# Patient Record
Sex: FEMALE | Race: BLACK OR AFRICAN AMERICAN | NOT HISPANIC OR LATINO | Employment: OTHER | URBAN - METROPOLITAN AREA
[De-identification: names, ages, dates, MRNs, and addresses within clinical notes are randomized per-mention and may not be internally consistent; named-entity substitution may affect disease eponyms.]

---

## 2019-09-25 ENCOUNTER — OFFICE VISIT (OUTPATIENT)
Dept: HEMATOLOGY/ONCOLOGY | Facility: CLINIC | Age: 84
End: 2019-09-25
Payer: COMMERCIAL

## 2019-09-25 VITALS
WEIGHT: 144.81 LBS | TEMPERATURE: 98 F | HEART RATE: 91 BPM | HEIGHT: 63 IN | SYSTOLIC BLOOD PRESSURE: 170 MMHG | DIASTOLIC BLOOD PRESSURE: 81 MMHG | OXYGEN SATURATION: 97 % | RESPIRATION RATE: 16 BRPM | BODY MASS INDEX: 25.66 KG/M2

## 2019-09-25 DIAGNOSIS — C67.3 MALIGNANT NEOPLASM OF ANTERIOR WALL OF URINARY BLADDER: Primary | Chronic | ICD-10-CM

## 2019-09-25 DIAGNOSIS — E11.9 TYPE 2 DIABETES MELLITUS WITHOUT COMPLICATION, WITHOUT LONG-TERM CURRENT USE OF INSULIN: Chronic | ICD-10-CM

## 2019-09-25 DIAGNOSIS — I10 ESSENTIAL HYPERTENSION: ICD-10-CM

## 2019-09-25 PROCEDURE — 99205 OFFICE O/P NEW HI 60 MIN: CPT | Mod: S$GLB,,, | Performed by: INTERNAL MEDICINE

## 2019-09-25 PROCEDURE — 99205 PR OFFICE/OUTPT VISIT, NEW, LEVL V, 60-74 MIN: ICD-10-PCS | Mod: S$GLB,,, | Performed by: INTERNAL MEDICINE

## 2019-09-25 PROCEDURE — 1101F PT FALLS ASSESS-DOCD LE1/YR: CPT | Mod: CPTII,S$GLB,, | Performed by: INTERNAL MEDICINE

## 2019-09-25 PROCEDURE — 1101F PR PT FALLS ASSESS DOC 0-1 FALLS W/OUT INJ PAST YR: ICD-10-PCS | Mod: CPTII,S$GLB,, | Performed by: INTERNAL MEDICINE

## 2019-09-25 RX ORDER — LEVOTHYROXINE SODIUM 50 UG/1
TABLET ORAL
COMMUNITY
Start: 2019-09-17 | End: 2020-07-30

## 2019-09-25 RX ORDER — GLIMEPIRIDE 2 MG/1
TABLET ORAL
COMMUNITY
Start: 2019-09-17

## 2019-09-25 RX ORDER — MELOXICAM 7.5 MG/1
TABLET ORAL
COMMUNITY
Start: 2019-07-30

## 2019-09-25 RX ORDER — SIMVASTATIN 20 MG/1
TABLET, FILM COATED ORAL
COMMUNITY
Start: 2019-08-28

## 2019-09-25 RX ORDER — HYDROCODONE BITARTRATE AND ACETAMINOPHEN 5; 325 MG/1; MG/1
TABLET ORAL
COMMUNITY
Start: 2019-09-24 | End: 2022-04-05

## 2019-09-25 RX ORDER — INDAPAMIDE 2.5 MG/1
TABLET ORAL
COMMUNITY
Start: 2019-09-17

## 2019-09-25 RX ORDER — METOPROLOL TARTRATE 50 MG/1
TABLET ORAL
COMMUNITY
Start: 2019-09-17

## 2019-09-25 RX ORDER — LOSARTAN POTASSIUM 25 MG/1
TABLET ORAL
COMMUNITY
Start: 2019-09-17 | End: 2022-02-01

## 2019-09-25 RX ORDER — LORATADINE 10 MG/1
TABLET ORAL
COMMUNITY
Start: 2019-09-19

## 2019-09-25 RX ORDER — METFORMIN HYDROCHLORIDE 500 MG/1
TABLET, EXTENDED RELEASE ORAL
COMMUNITY
Start: 2019-09-17

## 2019-09-25 NOTE — PROGRESS NOTES
HISTORY AND PHYSICAL NOTE  Hematology Oncology  [facility]    Date of Service: 2019    PATIENT: Sherice Haji  : 1932 87 y.o. female  MRN [id number]     PCP: Sejal Castillo MD  Consult Requested By:  No att. providers found    Date of Service: 2019  -----------------------------------------------------    Chief Complaint: Bladder Cancer      History of Present Illness     This is a 87 y.o. female patient with a history of The primary encounter diagnosis was Malignant 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. Diagnoses of Essential hypertension and Type 2 diabetes mellitus without complication, without long-term current use of insulin were also pertinent to this visit., who is referred here for evaluation treatment of bladder cancer.  The patient has been feeling weak and for T for about 2 months.  Urinalysis shows hematuria or and she was referred to Urology.  CT scan with IV contrast demonstrates a bladder mass. Biopsy shows muscle invasive bladder cancer.    The patient lost about 10 lb over the past 2 months.  She denies any abdominal pain chest pain short of breath.     Oncologic History:      Oncologic History     Oncologic Treatment     Pathology      Oncology History:   No history exists.        Past Medical History:   Past Medical History:   Diagnosis Date    Allergy     Anxiety     Arthritis     Bladder cancer     Cataract     Removed in May 2019    Depression     Diabetes mellitus     Hypertension     Malignant 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 2019    Type 2 diabetes mellitus without complication, without long-term current use of insulin 2019       Past Surgical HIstory:   Past Surgical History:   Procedure Laterality Date    BLADDER TUMOR EXCISION  2019    CATARACT EXTRACTION, BILATERAL  2019       Allergies:  Review of patient's allergies indicates:   Allergen Reactions    Aspirin        Medications:  Current  Outpatient Medications   Medication Sig Dispense Refill    glimepiride (AMARYL) 2 MG tablet       HYDROcodone-acetaminophen (NORCO) 5-325 mg per tablet       indapamide (LOZOL) 2.5 MG Tab       levothyroxine (SYNTHROID) 50 MCG tablet       loratadine (CLARITIN) 10 mg tablet       losartan (COZAAR) 25 MG tablet       meloxicam (MOBIC) 7.5 MG tablet       metFORMIN (GLUCOPHAGE-XR) 500 MG 24 hr tablet       metoprolol tartrate (LOPRESSOR) 50 MG tablet       simvastatin (ZOCOR) 20 MG tablet        No current facility-administered medications for this visit.        Family History:   Family History   Problem Relation Age of Onset    No Known Problems Mother     No Known Problems Father     Breast cancer Sister     Prostate cancer Brother     No Known Problems Maternal Aunt     No Known Problems Maternal Uncle     No Known Problems Paternal Aunt     No Known Problems Paternal Uncle     No Known Problems Maternal Grandmother     No Known Problems Maternal Grandfather     No Known Problems Paternal Grandmother     No Known Problems Paternal Grandfather     Breast cancer Sister     Prostate cancer Brother        Social History:  reports that she has never smoked. She has never used smokeless tobacco. She reports that she drank alcohol. She reports that she does not use drugs.    Review of Systemas  Constitutional: No change in weight, appetie, fatigue, fever, or night sweats  Eyes: No changes in vision  Ears, Nose, Mouth, Throat, and Face: No hearing problems, ear pain, rummy nose, or sore throat  Respiratory: No shortness of breath or cough  Cardiovascular: No chest pain, palpitations or dizziness  Gastrointestinal: No abdominal pain, hematochezia, melena  Genitourinary:  See HPI   Integumentary/Breast: No rashes or itching  Hematologic/Lymphatic: No anemia or bleeding abnormalities  Musculoskeletal: No joint or muscle abnormalities  Neurological: No sensory or motor problems, no  "headaches  Behavioral/Psych: No depression, anxiety, cognitive problems, or stress  Endocrine: No diabetes or thyroid problems  Allergic/Immunologic: See allergy above    Physical Exam      Vitals:   Vitals:    09/25/19 1002   BP: (!) 170/81   BP Location: Right arm   Patient Position: Sitting   BP Method: Large (Automatic)   Pulse: 91   Resp: 16   Temp: 97.9 °F (36.6 °C)   TempSrc: Temporal   SpO2: 97%   Weight: 65.7 kg (144 lb 12.8 oz)   Height: 5' 3" (1.6 m)     BMI: Body mass index is 25.65 kg/m².  BSA Body surface area is 1.71 meters squared.  ECOG Performance Status:   ECOG SCORE         GENERAL APPEARANCE: Well developed, well nourished, in no acute distress.  SKIN: Inspection of the skin reveals no rashes, ulcerations or petechiae.  HEENT: The sclerae were anicteric and conjunctivae were pink and moist. Extraocular movements were intact and pupils were equal, round, and reactive to light with normal accommodation. External inspection of the ears and nose showed no scars, lesions, or masses. Lips, teeth, and gums showed normal mucosa. The oral mucosa, hard and soft palate, tongue and posterior pharynx were normal.  NECK: Supple and symmetric. There was no thyroid enlargement, and no tenderness, or masses were felt.  CRESPIRATORY: Normal AP diameter and normal contour without any kyphoscoliosis. LUNGS: Auscultation of the lungs revealed normal breath sounds without any other adventitious sounds or rubs.  CARDIOVASCULAR: There was a regular rate and rhythm without any murmurs, gallops, rubs. The carotid pulses were normal and 2+ bilaterally without bruits. Peripheral pulses were 2+ and symmetric.  GASTROINTESTINAL: Soft and nontender with normal bowel sounds. The liver span was approximately 5-6 cm in the right midclavicular line by percussion. The liver edge was nontender. The spleen was not palpable. There were no inguinal or umbilical hernias noted. No ascites was noted.  LYMPH NODES: No lymphadenopathy was " appreciated in the neck, axillae or groin.  MUSCULOSKELETAL: Gait was normal. There was no tenderness or effusions noted. Muscle strength and tone were normal. EXTREMITIES: No cyanosis, clubbing or edema.  NEUROLOGIC: Alert and oriented x 3. Normal affect. Gait was normal. Normal deep tendon reflexes with no pathological reflexes. Sensation to touch was normal.  PSYCHIATRIC: good mood, orientated to place, time and person     Labs and Imagings     No results found for any previous visit.     Pathology reviewed muscle invasive bladder cancer  Imaging:   PET scan shows no evidence of distant metastatic disease  Assessment:     Principle Problem: Malignant 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 [C67.3]   Co-morbidity/Active Problems:   Patient Active Problem List   Diagnosis    Malignant 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    Essential hypertension    Type 2 diabetes mellitus without complication, without long-term current use of insulin      ----  1. Malignant 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    2. Essential hypertension    3. Type 2 diabetes mellitus without complication, without long-term current use of insulin      ----  Problem List Items Addressed This Visit        Active Problems    Type 2 diabetes mellitus without complication, without long-term current use of insulin (Chronic)    Relevant Medications    glimepiride (AMARYL) 2 MG tablet    metFORMIN (GLUCOPHAGE-XR) 500 MG 24 hr tablet    Malignant 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 - Primary (Chronic)    Relevant Orders    Echo Color Flow Doppler? Yes    CBC auto differential    CMP    Essential hypertension        ----  Sherice Haji is a 87 y.o. 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 Diagnoses of Essential hypertension and Type 2 diabetes mellitus without complication, without long-term current use of insulin were also pertinent to this  visit., with Malignant 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 [C67.3]   87-year-old female otherwise healthy except hypertension diabetes present with bladder mass biopsy-proven is muscle invasive bladder cancer she has a good performance status.    Plan:   --Labs: CBC CMP   --Imaging Studies Ordered:  Cardiac echocardiogram  --Proceed with Rx concurrent chemotherapy cisplatin 20 mg and Taxol 50 mg weekly with radiation therapy after complete staging and workup.  Likely to start in 2 weeks  --refer to Radiation Oncology; the overall plan is to concurrent chemoradiation therapy and bladder preservation.  If there is residual disease after completion of chemoradiation therapy the option will be cystectomy versus second-line immunotherapy to induce a local response in the bladder.  She is on likely to be a good candidate for standard 1st dose of chemotherapy given her advanced age.  --return to clinic in 2 weeks  --to MA Return to Clinic with appointment in 2 weeks   CC: Dr Fountain; Dr Soto Gordon M.D., Ph.D., [facility]  Hematology-Oncology    Signed 9/25/2019 11:15 AM   Note Ends Here

## 2019-10-07 LAB
ABS NRBC COUNT: 0 X 10 3/UL (ref 0–0.01)
ABSOLUTE BASOPHIL: 0.03 X 10 3/UL (ref 0–0.22)
ABSOLUTE EOSINOPHIL: 0.18 X 10 3/UL (ref 0.04–0.54)
ABSOLUTE IMMATURE GRAN: 0.01 X 10 3/UL (ref 0–0.04)
ABSOLUTE LYMPHOCYTE: 2.24 X 10 3/UL (ref 0.86–4.75)
ABSOLUTE MONOCYTE: 0.8 X 10 3/UL (ref 0.22–1.08)
ALBUMIN SERPL-MCNC: 4.6 G/DL (ref 3.5–5.2)
ALBUMIN/GLOB SERPL ELPH: 1.6 {RATIO} (ref 1–2.7)
ALP ISOS SERPL LEV INH-CCNC: 37 U/L (ref 35–105)
ALT (SGPT): 10 U/L (ref 0–33)
ANION GAP SERPL CALC-SCNC: 10 MMOL/L (ref 8–17)
AST SERPL-CCNC: 12 U/L (ref 0–32)
BASOPHILS NFR BLD: 0.6 % (ref 0.2–1.2)
BILIRUBIN, TOTAL: 0.24 MG/DL (ref 0–1.2)
BUN/CREAT SERPL: 14.5 (ref 6–20)
CALCIUM SERPL-MCNC: 10.1 MG/DL (ref 8.6–10.2)
CARBON DIOXIDE, CO2: 28 MMOL/L (ref 22–29)
CHLORIDE: 92 MMOL/L (ref 98–107)
CREAT SERPL-MCNC: 0.83 MG/DL (ref 0.5–0.9)
EOSINOPHIL NFR BLD: 3.7 % (ref 0.7–7)
GFR ESTIMATION: 65.03
GLOBULIN: 2.9 G/DL (ref 1.5–4.5)
GLUCOSE: 186 MG/DL (ref 82–115)
HCT VFR BLD AUTO: 38.1 % (ref 37–47)
HGB BLD-MCNC: 12.9 G/DL (ref 12–16)
IMMATURE GRANULOCYTES: 0.2 % (ref 0–0.5)
LYMPHOCYTES NFR BLD: 45.7 % (ref 19.3–53.1)
MCH RBC QN AUTO: 29.4 PG (ref 27–32)
MCHC RBC AUTO-ENTMCNC: 33.9 G/DL (ref 32–36)
MCV RBC AUTO: 86.8 FL (ref 82–100)
MONOCYTES NFR BLD: 16.3 % (ref 4.7–12.5)
NEUTROPHILS ABSOLUTE COUNT: 1.64 X 10 3/UL (ref 2.15–7.56)
NEUTROPHILS NFR BLD: 33.5 %
NUCLEATED RED BLOOD CELLS: 0 /100 WBC (ref 0–0.2)
PLATELET # BLD AUTO: 239 X 10 3/UL (ref 135–400)
POTASSIUM: 4.6 MMOL/L (ref 3.5–5.1)
PROT SNV-MCNC: 7.5 G/DL (ref 6.4–8.3)
RBC # BLD AUTO: 4.39 X 10 6/UL (ref 4.2–5.4)
RDW-SD: 41.9 FL (ref 37–54)
SODIUM: 130 MMOL/L (ref 136–145)
UREA NITROGEN (BUN): 12 MG/DL (ref 8–23)
WBC # BLD: 4.9 X 10 3/UL (ref 4.3–10.8)

## 2019-10-09 ENCOUNTER — OFFICE VISIT (OUTPATIENT)
Dept: HEMATOLOGY/ONCOLOGY | Facility: CLINIC | Age: 84
End: 2019-10-09
Payer: COMMERCIAL

## 2019-10-09 VITALS
BODY MASS INDEX: 26.22 KG/M2 | OXYGEN SATURATION: 98 % | HEART RATE: 85 BPM | RESPIRATION RATE: 14 BRPM | WEIGHT: 148 LBS | HEIGHT: 63 IN | TEMPERATURE: 98 F | DIASTOLIC BLOOD PRESSURE: 92 MMHG | SYSTOLIC BLOOD PRESSURE: 175 MMHG

## 2019-10-09 DIAGNOSIS — C67.3 MALIGNANT NEOPLASM OF ANTERIOR WALL OF URINARY BLADDER: Primary | ICD-10-CM

## 2019-10-09 DIAGNOSIS — E11.9 TYPE 2 DIABETES MELLITUS WITHOUT COMPLICATION, WITHOUT LONG-TERM CURRENT USE OF INSULIN: ICD-10-CM

## 2019-10-09 DIAGNOSIS — I10 ESSENTIAL HYPERTENSION: ICD-10-CM

## 2019-10-09 PROCEDURE — 1101F PR PT FALLS ASSESS DOC 0-1 FALLS W/OUT INJ PAST YR: ICD-10-PCS | Mod: CPTII,S$GLB,, | Performed by: INTERNAL MEDICINE

## 2019-10-09 PROCEDURE — 1101F PT FALLS ASSESS-DOCD LE1/YR: CPT | Mod: CPTII,S$GLB,, | Performed by: INTERNAL MEDICINE

## 2019-10-09 PROCEDURE — 99215 PR OFFICE/OUTPT VISIT, EST, LEVL V, 40-54 MIN: ICD-10-PCS | Mod: S$GLB,,, | Performed by: INTERNAL MEDICINE

## 2019-10-09 PROCEDURE — 99215 OFFICE O/P EST HI 40 MIN: CPT | Mod: S$GLB,,, | Performed by: INTERNAL MEDICINE

## 2019-10-09 NOTE — PROGRESS NOTES
HISTORY AND PHYSICAL NOTE  Hematology Oncology  [facility]    Date of Service: 10/9/2019    PATIENT: Sherice Haji  : 1932 87 y.o. female  MRN [id number]     PCP: Sejal Castillo MD  Consult Requested By:  No att. providers found    Date of Service: 10/9/2019  -----------------------------------------------------    Chief Complaint: bladder cancer      History of Present Illness     The patient have no new complaints and doing okay The patient here for followup.      This is a 87 y.o. female patient with a history of The primary encounter diagnosis was Malignant 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. Diagnoses of Essential hypertension and Type 2 diabetes mellitus without complication, without long-term current use of insulin were also pertinent to this visit., who is referred here for evaluation treatment of bladder cancer.  The patient has been feeling weak and for T for about 2 months.  Urinalysis shows hematuria or and she was referred to Urology.  CT scan with IV contrast demonstrates a bladder mass. Biopsy shows muscle invasive bladder cancer.    The patient lost about 10 lb over the past 2 months.  She denies any abdominal pain chest pain short of breath.     Oncologic History:      Oncologic History     Oncologic Treatment     Pathology      Oncology History:   No history exists.        Past Medical History:   Past Medical History:   Diagnosis Date    Allergy     Anxiety     Arthritis     Bladder cancer     Cataract     Removed in May 2019    Depression     Diabetes mellitus     Hypertension     Malignant 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 2019    Type 2 diabetes mellitus without complication, without long-term current use of insulin 2019       Past Surgical HIstory:   Past Surgical History:   Procedure Laterality Date    BLADDER TUMOR EXCISION  2019    CATARACT EXTRACTION, BILATERAL  2019       Allergies:  Review of patient's  allergies indicates:   Allergen Reactions    Aspirin        Medications:  Current Outpatient Medications   Medication Sig Dispense Refill    glimepiride (AMARYL) 2 MG tablet       HYDROcodone-acetaminophen (NORCO) 5-325 mg per tablet       indapamide (LOZOL) 2.5 MG Tab       levothyroxine (SYNTHROID) 50 MCG tablet       loratadine (CLARITIN) 10 mg tablet       losartan (COZAAR) 25 MG tablet       meloxicam (MOBIC) 7.5 MG tablet       metFORMIN (GLUCOPHAGE-XR) 500 MG 24 hr tablet       metoprolol tartrate (LOPRESSOR) 50 MG tablet       simvastatin (ZOCOR) 20 MG tablet        No current facility-administered medications for this visit.        Family History:   Family History   Problem Relation Age of Onset    No Known Problems Mother     No Known Problems Father     Breast cancer Sister     Prostate cancer Brother     No Known Problems Maternal Aunt     No Known Problems Maternal Uncle     No Known Problems Paternal Aunt     No Known Problems Paternal Uncle     No Known Problems Maternal Grandmother     No Known Problems Maternal Grandfather     No Known Problems Paternal Grandmother     No Known Problems Paternal Grandfather     Breast cancer Sister     Prostate cancer Brother        Social History:  reports that she has never smoked. She has never used smokeless tobacco. She reports that she drank alcohol. She reports that she does not use drugs.    Review of Systemas  Constitutional: No change in weight, appetie, fatigue, fever, or night sweats  Eyes: No changes in vision  Ears, Nose, Mouth, Throat, and Face: No hearing problems, ear pain, rummy nose, or sore throat  Respiratory: No shortness of breath or cough  Cardiovascular: No chest pain, palpitations or dizziness  Gastrointestinal: No abdominal pain, hematochezia, melena  Genitourinary:  See HPI   Integumentary/Breast: No rashes or itching  Hematologic/Lymphatic: No anemia or bleeding abnormalities  Musculoskeletal: No joint or muscle  "abnormalities  Neurological: No sensory or motor problems, no headaches  Behavioral/Psych: No depression, anxiety, cognitive problems, or stress  Endocrine: No diabetes or thyroid problems  Allergic/Immunologic: See allergy above    Physical Exam      Vitals:   Vitals:    10/09/19 1026   BP: (!) 175/92   BP Location: Right arm   Patient Position: Sitting   BP Method: Large (Automatic)   Pulse: 85   Resp: 14   Temp: 97.6 °F (36.4 °C)   TempSrc: Temporal   SpO2: 98%   Weight: 67.1 kg (148 lb)   Height: 5' 3" (1.6 m)     BMI: Body mass index is 26.22 kg/m².  BSA Body surface area is 1.73 meters squared.  ECOG Performance Status:   ECOG SCORE         GENERAL APPEARANCE: Well developed, well nourished, in no acute distress.  SKIN: Inspection of the skin reveals no rashes, ulcerations or petechiae.  HEENT: The sclerae were anicteric and conjunctivae were pink and moist. Extraocular movements were intact and pupils were equal, round, and reactive to light with normal accommodation. External inspection of the ears and nose showed no scars, lesions, or masses. Lips, teeth, and gums showed normal mucosa. The oral mucosa, hard and soft palate, tongue and posterior pharynx were normal.  NECK: Supple and symmetric. There was no thyroid enlargement, and no tenderness, or masses were felt.  CRESPIRATORY: Normal AP diameter and normal contour without any kyphoscoliosis. LUNGS: Auscultation of the lungs revealed normal breath sounds without any other adventitious sounds or rubs.  CARDIOVASCULAR: There was a regular rate and rhythm without any murmurs, gallops, rubs. The carotid pulses were normal and 2+ bilaterally without bruits. Peripheral pulses were 2+ and symmetric.  GASTROINTESTINAL: Soft and nontender with normal bowel sounds. The liver span was approximately 5-6 cm in the right midclavicular line by percussion. The liver edge was nontender. The spleen was not palpable. There were no inguinal or umbilical hernias noted. No " ascites was noted.  LYMPH NODES: No lymphadenopathy was appreciated in the neck, axillae or groin.  MUSCULOSKELETAL: Gait was normal. There was no tenderness or effusions noted. Muscle strength and tone were normal. EXTREMITIES: No cyanosis, clubbing or edema.  NEUROLOGIC: Alert and oriented x 3. Normal affect. Gait was normal. Normal deep tendon reflexes with no pathological reflexes. Sensation to touch was normal.  PSYCHIATRIC: good mood, orientated to place, time and person     Labs and Imagings     No results found for any previous visit.     Pathology reviewed muscle invasive bladder cancer  Imaging:   PET scan shows no evidence of distant metastatic disease  Assessment:     Principle Problem: Malignant 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 [C67.3]   Co-morbidity/Active Problems:   Patient Active Problem List   Diagnosis    Malignant 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    Essential hypertension    Type 2 diabetes mellitus without complication, without long-term current use of insulin      ----  1. Malignant 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    2. Essential hypertension    3. Type 2 diabetes mellitus without complication, without long-term current use of insulin      ----  Problem List Items Addressed This Visit        Active Problems    Type 2 diabetes mellitus without complication, without long-term current use of insulin (Chronic)    Malignant 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 - Primary (Chronic)    Relevant Orders    CBC auto differential    CMP    Magnesium    Essential hypertension        ----  Sherice Haji is a 87 y.o. 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 [C67.3]    87-year-old female otherwise healthy except hypertension diabetes present with bladder mass biopsy-proven is muscle invasive bladder cancer she has a good performance status.  Normal renal function    Plan:   --Labs: CBC CMP and magnesium every week  --Proceed with Rx concurrent chemotherapy cisplatin 20 mg and Taxol 50 mg weekly with radiation therapy after complete staging and workup.  Likely to start in 2 weeks  --refer to Radiation Oncology; the overall plan is to concurrent chemoradiation therapy and bladder preservation.  If there is residual disease after completion of chemoradiation therapy the option will be cystectomy versus second-line immunotherapy to induce a local response in the bladder.    --return to clinic in 1 weeks  --refer to Dr. Brooks   CC: Dr Fountain; Dr Soto Gordon M.D., Ph.D., [facility]  Hematology-Oncology    Signed 10/9/2019 11:15 AM   Note Ends Here

## 2019-10-14 LAB
ABS NRBC COUNT: 0 X 10 3/UL (ref 0–0.01)
ABSOLUTE BASOPHIL: 0.04 X 10 3/UL (ref 0–0.22)
ABSOLUTE EOSINOPHIL: 0.24 X 10 3/UL (ref 0.04–0.54)
ABSOLUTE IMMATURE GRAN: 0.01 X 10 3/UL (ref 0–0.04)
ABSOLUTE LYMPHOCYTE: 1.74 X 10 3/UL (ref 0.86–4.75)
ABSOLUTE MONOCYTE: 0.71 X 10 3/UL (ref 0.22–1.08)
ALBUMIN SERPL-MCNC: 4.3 G/DL (ref 3.5–5.2)
ALBUMIN/GLOB SERPL ELPH: 1.5 {RATIO} (ref 1–2.7)
ALP ISOS SERPL LEV INH-CCNC: 34 U/L (ref 35–105)
ALT (SGPT): 11 U/L (ref 0–33)
ANION GAP SERPL CALC-SCNC: 8 MMOL/L (ref 8–17)
AST SERPL-CCNC: 11 U/L (ref 0–32)
BASOPHILS NFR BLD: 0.9 % (ref 0.2–1.2)
BILIRUBIN, TOTAL: 0.31 MG/DL (ref 0–1.2)
BUN/CREAT SERPL: 21.4 (ref 6–20)
CALCIUM SERPL-MCNC: 9.9 MG/DL (ref 8.6–10.2)
CARBON DIOXIDE, CO2: 28 MMOL/L (ref 22–29)
CHLORIDE: 97 MMOL/L (ref 98–107)
CREAT SERPL-MCNC: 0.74 MG/DL (ref 0.5–0.9)
EOSINOPHIL NFR BLD: 5.3 % (ref 0.7–7)
GFR ESTIMATION: 74.24
GLOBULIN: 2.8 G/DL (ref 1.5–4.5)
GLUCOSE: 164 MG/DL (ref 82–115)
HCT VFR BLD AUTO: 35.8 % (ref 37–47)
HGB BLD-MCNC: 11.9 G/DL (ref 12–16)
IMMATURE GRANULOCYTES: 0.2 % (ref 0–0.5)
LYMPHOCYTES NFR BLD: 38.2 % (ref 19.3–53.1)
MCH RBC QN AUTO: 29.2 PG (ref 27–32)
MCHC RBC AUTO-ENTMCNC: 33.2 G/DL (ref 32–36)
MCV RBC AUTO: 87.7 FL (ref 82–100)
MONOCYTES NFR BLD: 15.6 % (ref 4.7–12.5)
NEUTROPHILS ABSOLUTE COUNT: 1.82 X 10 3/UL (ref 2.15–7.56)
NEUTROPHILS NFR BLD: 39.8 %
NUCLEATED RED BLOOD CELLS: 0 /100 WBC (ref 0–0.2)
PLATELET # BLD AUTO: 229 X 10 3/UL (ref 135–400)
POTASSIUM: 4.4 MMOL/L (ref 3.5–5.1)
PROT SNV-MCNC: 7.1 G/DL (ref 6.4–8.3)
RBC # BLD AUTO: 4.08 X 10 6/UL (ref 4.2–5.4)
RDW-SD: 44.4 FL (ref 37–54)
SODIUM: 133 MMOL/L (ref 136–145)
UREA NITROGEN (BUN): 15.8 MG/DL (ref 8–23)
WBC # BLD: 4.56 X 10 3/UL (ref 4.3–10.8)

## 2019-10-16 ENCOUNTER — OFFICE VISIT (OUTPATIENT)
Dept: HEMATOLOGY/ONCOLOGY | Facility: CLINIC | Age: 84
End: 2019-10-16
Payer: COMMERCIAL

## 2019-10-16 VITALS
DIASTOLIC BLOOD PRESSURE: 84 MMHG | SYSTOLIC BLOOD PRESSURE: 173 MMHG | HEART RATE: 91 BPM | HEIGHT: 63 IN | BODY MASS INDEX: 26.08 KG/M2 | RESPIRATION RATE: 16 BRPM | TEMPERATURE: 98 F | WEIGHT: 147.19 LBS | OXYGEN SATURATION: 96 %

## 2019-10-16 DIAGNOSIS — C67.3 MALIGNANT NEOPLASM OF ANTERIOR WALL OF URINARY BLADDER: Primary | ICD-10-CM

## 2019-10-16 PROCEDURE — 99214 OFFICE O/P EST MOD 30 MIN: CPT | Mod: S$GLB,,, | Performed by: INTERNAL MEDICINE

## 2019-10-16 PROCEDURE — 1101F PT FALLS ASSESS-DOCD LE1/YR: CPT | Mod: CPTII,S$GLB,, | Performed by: INTERNAL MEDICINE

## 2019-10-16 PROCEDURE — 99214 PR OFFICE/OUTPT VISIT, EST, LEVL IV, 30-39 MIN: ICD-10-PCS | Mod: S$GLB,,, | Performed by: INTERNAL MEDICINE

## 2019-10-16 PROCEDURE — 1101F PR PT FALLS ASSESS DOC 0-1 FALLS W/OUT INJ PAST YR: ICD-10-PCS | Mod: CPTII,S$GLB,, | Performed by: INTERNAL MEDICINE

## 2019-10-16 NOTE — PROGRESS NOTES
Hematology Oncology Progress Note    Hematology Oncology  [facility]     PATIENT: Sherice Haji  : 1932 87 y.o. female  MRN [id number]     PCP: Sejal Castillo MD  Consult Requested By:  No att. providers found    Date of Service: 10/16/2019    Subjective:   Interim History:  Bladder Cancer    Sherice Haji is here for to followup for bladder cancer.  Current therapy patient doing well without new complaints she specifically denies hematuria or weight loss she has a good appetite.  This is a 87 y.o. female patient with a history of The primary encounter diagnosis was Malignant 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. Diagnoses of Essential hypertension and Type 2 diabetes mellitus without complication, without long-term current use of insulin were also pertinent to this visit., who is referred here for evaluation treatment of bladder cancer.  The patient has been feeling weak and for T for about 2 months.  Urinalysis shows hematuria or and she was referred to Urology.  CT scan with IV contrast demonstrates a bladder mass. Biopsy shows muscle invasive bladder cancer.     The patient lost about 10 lb over the past 2 months.  She denies any abdominal pain chest pain short of breath.        Oncology History:   No history exists.       Oncologic History:      Oncologic History     Oncologic Treatment     Pathology      Past Medical History:   Past Medical History:   Diagnosis Date    Allergy     Anxiety     Arthritis     Bladder cancer     Cataract     Removed in May 2019    Depression     Diabetes mellitus     Hypertension     Malignant 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 2019    Type 2 diabetes mellitus without complication, without long-term current use of insulin 2019       Past Surgical HIstory:   Past Surgical History:   Procedure Laterality Date    BLADDER TUMOR EXCISION  2019    CATARACT EXTRACTION, BILATERAL  2019        Allergies:  Review of patient's allergies indicates:   Allergen Reactions    Aspirin        Medications:  Current Outpatient Medications   Medication Sig Dispense Refill    glimepiride (AMARYL) 2 MG tablet       HYDROcodone-acetaminophen (NORCO) 5-325 mg per tablet       indapamide (LOZOL) 2.5 MG Tab       levothyroxine (SYNTHROID) 50 MCG tablet       loratadine (CLARITIN) 10 mg tablet       losartan (COZAAR) 25 MG tablet       meloxicam (MOBIC) 7.5 MG tablet       metFORMIN (GLUCOPHAGE-XR) 500 MG 24 hr tablet       metoprolol tartrate (LOPRESSOR) 50 MG tablet       simvastatin (ZOCOR) 20 MG tablet        No current facility-administered medications for this visit.        Family History:   Family History   Problem Relation Age of Onset    No Known Problems Mother     No Known Problems Father     Breast cancer Sister     Prostate cancer Brother     No Known Problems Maternal Aunt     No Known Problems Maternal Uncle     No Known Problems Paternal Aunt     No Known Problems Paternal Uncle     No Known Problems Maternal Grandmother     No Known Problems Maternal Grandfather     No Known Problems Paternal Grandmother     No Known Problems Paternal Grandfather     Breast cancer Sister     Prostate cancer Brother        Social History:  reports that she has never smoked. She has never used smokeless tobacco. She reports that she drank alcohol. She reports that she does not use drugs.    Review of Systemas  Constitutional: No change in weight, appetie, fatigue, fever, or night sweats  Eyes: No changes in vision  Ears, Nose, Mouth, Throat, and Face: No hearing problems, ear pain, rummy nose, or sore throat  Respiratory: No shortness of breath or cough  Cardiovascular: No chest pain, palpitations or dizziness  Gastrointestinal: No abdominal pain, hematochezia, melena  Genitourinary: No dysuria, hematuria, nocturia, menstrual problems, post menopausal  Integumentary/Breast: No rashes or  "itching  Hematologic/Lymphatic: No anemia or bleeding abnormalities  Musculoskeletal: No joint or muscle abnormalities  Neurological: No sensory or motor problems, no headaches  Behavioral/Psych: No depression, anxiety, cognitive problems, or stress  Endocrine: No diabetes or thyroid problems  Allergic/Immunologic: See allergy above    Physical Exam      Vitals:   Vitals:    10/16/19 0902   BP: (!) 173/84   BP Location: Left arm   Patient Position: Sitting   BP Method: Large (Automatic)   Pulse: 91   Resp: 16   Temp: 97.6 °F (36.4 °C)   TempSrc: Temporal   SpO2: 96%   Weight: 66.8 kg (147 lb 3.2 oz)   Height: 5' 3" (1.6 m)     BMI: Body mass index is 26.08 kg/m².  BSA Body surface area is 1.72 meters squared.  ECOG Performance Status:   ECOG SCORE        ECOG 1    GENERAL APPEARANCE: Well developed, well nourished, in no acute distress.  SKIN: Inspection of the skin reveals no rashes, ulcerations or petechiae.  HEENT: The sclerae were anicteric and conjunctivae were pink and moist. Extraocular movements were intact and pupils were equal, round, and reactive to light with normal accommodation. External inspection of the ears and nose showed no scars, lesions, or masses. Lips, teeth, and gums showed normal mucosa. The oral mucosa, hard and soft palate, tongue and posterior pharynx were normal.  NECK: Supple and symmetric. There was no thyroid enlargement, and no tenderness, or masses were felt.  CRESPIRATORY: Normal AP diameter and normal contour without any kyphoscoliosis. LUNGS: Auscultation of the lungs revealed normal breath sounds without any other adventitious sounds or rubs.  CARDIOVASCULAR: There was a regular rate and rhythm without any murmurs, gallops, rubs. The carotid pulses were normal and 2+ bilaterally without bruits. Peripheral pulses were 2+ and symmetric.  GASTROINTESTINAL: Soft and nontender with normal bowel sounds. The liver span was approximately 5-6 cm in the right midclavicular line by " percussion. The liver edge was nontender. The spleen was not palpable. There were no inguinal or umbilical hernias noted. No ascites was noted.  LYMPH NODES: No lymphadenopathy was appreciated in the neck, axillae or groin.  MUSCULOSKELETAL: Gait was normal. There was no tenderness or effusions noted. Muscle strength and tone were normal. EXTREMITIES: No cyanosis, clubbing or edema.  NEUROLOGIC: Alert and oriented x 3. Normal affect. Gait was normal. Normal deep tendon reflexes with no pathological reflexes. Sensation to touch was normal.  PSYCHIATRIC: good mood, orientated to place, time and person     Labs and Imagings     No results found for any previous visit.       Imaging:     Assessment:     Principle Problem: Malignant 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 [C67.3]   Co-morbidity/Active Problems:   Patient Active Problem List   Diagnosis    Malignant 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    Essential hypertension    Type 2 diabetes mellitus without complication, without long-term current use of insulin        Sherice Haji is a 87 y.o. 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 ECOG 1  Normal renal function      Plan:   Overall Plan:  Concurrent chemoradiation therapy    --Labs: CBC CMP and  magnesium every week  --Proceed with Rx concurrent chemotherapy cisplatin 20 mg and Taxol 50 mg weekly with radiation therapy after complete staging and workup.   --refer to Radiation Oncology; the overall plan is to concurrent chemoradiation therapy and bladder preservation.  If there is residual disease after completion of chemoradiation therapy the option will be cystectomy versus second-line immunotherapy to induce a local response in the bladder.    --return to clinic in 2 week  --refer to Dr. Todd Gordon M.D., Ph.D., [facility]  Hematology-Oncology    Signed 10/16/2019 9:47 AM   Note Ends Here

## 2019-10-21 PROBLEM — C67.3 MALIGNANT NEOPLASM OF ANTERIOR WALL OF URINARY BLADDER: Status: ACTIVE | Noted: 2019-09-25

## 2019-10-30 ENCOUNTER — OFFICE VISIT (OUTPATIENT)
Dept: HEMATOLOGY/ONCOLOGY | Facility: CLINIC | Age: 84
End: 2019-10-30
Payer: COMMERCIAL

## 2019-10-30 VITALS
RESPIRATION RATE: 12 BRPM | WEIGHT: 147.38 LBS | TEMPERATURE: 98 F | HEIGHT: 63 IN | DIASTOLIC BLOOD PRESSURE: 90 MMHG | BODY MASS INDEX: 26.11 KG/M2 | HEART RATE: 68 BPM | SYSTOLIC BLOOD PRESSURE: 160 MMHG

## 2019-10-30 DIAGNOSIS — C67.3 MALIGNANT NEOPLASM OF ANTERIOR WALL OF URINARY BLADDER: Primary | ICD-10-CM

## 2019-10-30 DIAGNOSIS — Z51.11 ENCOUNTER FOR ANTINEOPLASTIC CHEMOTHERAPY: ICD-10-CM

## 2019-10-30 DIAGNOSIS — E11.9 TYPE 2 DIABETES MELLITUS WITHOUT COMPLICATION, WITHOUT LONG-TERM CURRENT USE OF INSULIN: ICD-10-CM

## 2019-10-30 DIAGNOSIS — I10 ESSENTIAL HYPERTENSION: ICD-10-CM

## 2019-10-30 PROCEDURE — 1101F PT FALLS ASSESS-DOCD LE1/YR: CPT | Mod: CPTII,S$GLB,, | Performed by: NURSE PRACTITIONER

## 2019-10-30 PROCEDURE — 1101F PR PT FALLS ASSESS DOC 0-1 FALLS W/OUT INJ PAST YR: ICD-10-PCS | Mod: CPTII,S$GLB,, | Performed by: NURSE PRACTITIONER

## 2019-10-30 PROCEDURE — 99214 PR OFFICE/OUTPT VISIT, EST, LEVL IV, 30-39 MIN: ICD-10-PCS | Mod: S$GLB,,, | Performed by: NURSE PRACTITIONER

## 2019-10-30 PROCEDURE — 99214 OFFICE O/P EST MOD 30 MIN: CPT | Mod: S$GLB,,, | Performed by: NURSE PRACTITIONER

## 2019-10-30 RX ORDER — SODIUM CHLORIDE 0.9 % (FLUSH) 0.9 %
10 SYRINGE (ML) INJECTION
Status: CANCELLED | OUTPATIENT
Start: 2019-10-31

## 2019-10-30 RX ORDER — FAMOTIDINE 10 MG/ML
20 INJECTION INTRAVENOUS
Status: CANCELLED | OUTPATIENT
Start: 2019-10-31

## 2019-10-30 RX ORDER — HEPARIN 100 UNIT/ML
500 SYRINGE INTRAVENOUS
Status: CANCELLED | OUTPATIENT
Start: 2019-10-31

## 2019-10-30 RX ORDER — EPINEPHRINE 0.3 MG/.3ML
0.3 INJECTION SUBCUTANEOUS ONCE AS NEEDED
Status: CANCELLED | OUTPATIENT
Start: 2019-10-31

## 2019-10-30 RX ORDER — DIPHENHYDRAMINE HYDROCHLORIDE 50 MG/ML
50 INJECTION INTRAMUSCULAR; INTRAVENOUS ONCE AS NEEDED
Status: CANCELLED | OUTPATIENT
Start: 2019-10-31

## 2019-10-30 NOTE — PROGRESS NOTES
Hematology Oncology Progress Note    Hematology Oncology  [facility]     PATIENT: Sherice Haji  : 1932 87 y.o. female  MRN [id number]     PCP: Sejal Castillo MD  Consult Requested By:  No att. providers found    Date of Service: 10/30/2019    Subjective:   Interim History:  Cancer (Malignant 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 )    Sherice Haji is here for to followup for bladder cancer.  Current therapy patient doing well without new complaints she specifically denies hematuria or weight loss she has a good appetite.  This is a 87 y.o. female patient with a history of The primary encounter diagnosis was Malignant 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. Diagnoses of Essential hypertension and Type 2 diabetes mellitus without complication, without long-term current use of insulin were also pertinent to this visit., who is referred here for evaluation treatment of bladder cancer.  The patient has been feeling weak and for T for about 2 months.  Urinalysis shows hematuria or and she was referred to Urology.  CT scan with IV contrast demonstrates a bladder mass. Biopsy shows muscle invasive bladder cancer.     The patient lost about 10 lb over the past 2 months.  She denies any abdominal pain chest pain short of breath.        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    2019 Initial Diagnosis     Malignant 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      10/21/2019 Cancer Staged     Staging form: Urinary Bladder, AJCC 8th Edition  - Clinical: Stage II (cT2, cN0, cM0)      10/22/2019 -  Chemotherapy     Treatment Summary   Plan Name: cisplatin taxol weekly  Treatment Goal: Curative  Status: Active  Start Date: 10/22/2019 (Planned)  End Date: 2019 (Planned)  Provider: Hoda Josue NP  Chemotherapy: CISplatin (PLATINOL) 20 mg/m2 = 34 mg in sodium chloride 0.9% 500 mL chemo infusion, 20 mg/m2 = 34 mg (original dose ),  Intravenous, Clinic/HOD 1 time, 0 of 6 cycles  Dose modification: 20 mg/m2 (Cycle 1)  PACLitaxel (TAXOL) 50 mg/m2 = 84 mg in sodium chloride 0.9% 500 mL chemo infusion, 50 mg/m2 = 84 mg (original dose ), Intravenous, Clinic/HOD 1 time, 0 of 6 cycles  Dose modification: 50 mg/m2 (Cycle 1)         Oncologic History:      Oncologic History     Oncologic Treatment     Pathology      Past Medical History:   Past Medical History:   Diagnosis Date    Allergy     Anxiety     Arthritis     Bladder cancer     Cataract     Removed in May 2019    Depression     Diabetes mellitus     Hypertension     Malignant 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 9/25/2019    Type 2 diabetes mellitus without complication, without long-term current use of insulin 9/25/2019       Past Surgical HIstory:   Past Surgical History:   Procedure Laterality Date    BLADDER TUMOR EXCISION  09/2019    CATARACT EXTRACTION, BILATERAL  05/2019       Allergies:  Review of patient's allergies indicates:   Allergen Reactions    Aspirin        Medications:  Current Outpatient Medications   Medication Sig Dispense Refill    glimepiride (AMARYL) 2 MG tablet       HYDROcodone-acetaminophen (NORCO) 5-325 mg per tablet       indapamide (LOZOL) 2.5 MG Tab       levothyroxine (SYNTHROID) 50 MCG tablet       loratadine (CLARITIN) 10 mg tablet       losartan (COZAAR) 25 MG tablet       meloxicam (MOBIC) 7.5 MG tablet       metFORMIN (GLUCOPHAGE-XR) 500 MG 24 hr tablet       metoprolol tartrate (LOPRESSOR) 50 MG tablet       simvastatin (ZOCOR) 20 MG tablet        No current facility-administered medications for this visit.        Family History:   Family History   Problem Relation Age of Onset    No Known Problems Mother     No Known Problems Father     Breast cancer Sister     Prostate cancer Brother     No Known Problems Maternal Aunt     No Known Problems Maternal Uncle     No Known Problems Paternal Aunt     No Known Problems  "Paternal Uncle     No Known Problems Maternal Grandmother     No Known Problems Maternal Grandfather     No Known Problems Paternal Grandmother     No Known Problems Paternal Grandfather     Breast cancer Sister     Prostate cancer Brother        Social History:  reports that she has never smoked. She has never used smokeless tobacco. She reports that she drank alcohol. She reports that she does not use drugs.    Review of Systemas  Constitutional: No change in weight, appetie, fatigue, fever, or night sweats  Eyes: No changes in vision  Ears, Nose, Mouth, Throat, and Face: No hearing problems, ear pain, rummy nose, or sore throat  Respiratory: No shortness of breath or cough  Cardiovascular: No chest pain, palpitations or dizziness  Gastrointestinal: No abdominal pain, hematochezia, melena  Genitourinary: No dysuria, hematuria, nocturia, menstrual problems, post menopausal  Integumentary/Breast: No rashes or itching  Hematologic/Lymphatic: No anemia or bleeding abnormalities  Musculoskeletal: No joint or muscle abnormalities  Neurological: No sensory or motor problems, no headaches  Behavioral/Psych: No depression, anxiety, cognitive problems, or stress  Endocrine: No diabetes or thyroid problems  Allergic/Immunologic: See allergy above    Physical Exam      Vitals:   Vitals:    10/30/19 1056   Resp: 12   Temp: 97.7 °F (36.5 °C)   TempSrc: Temporal   Weight: 66.9 kg (147 lb 6.4 oz)   Height: 5' 3" (1.6 m)     BMI: Body mass index is 26.11 kg/m².  BSA Body surface area is 1.72 meters squared.  ECOG Performance Status:   ECOG SCORE        ECOG 1    GENERAL APPEARANCE: Well developed, well nourished, in no acute distress.  SKIN: Inspection of the skin reveals no rashes, ulcerations or petechiae.  HEENT: The sclerae were anicteric and conjunctivae were pink and moist. Extraocular movements were intact and pupils were equal, round, and reactive to light with normal accommodation. External inspection of the ears " and nose showed no scars, lesions, or masses. Lips, teeth, and gums showed normal mucosa. The oral mucosa, hard and soft palate, tongue and posterior pharynx were normal.  NECK: Supple and symmetric. There was no thyroid enlargement, and no tenderness, or masses were felt.  CRESPIRATORY: Normal AP diameter and normal contour without any kyphoscoliosis. LUNGS: Auscultation of the lungs revealed normal breath sounds without any other adventitious sounds or rubs.  CARDIOVASCULAR: There was a regular rate and rhythm without any murmurs, gallops, rubs. The carotid pulses were normal and 2+ bilaterally without bruits. Peripheral pulses were 2+ and symmetric.  GASTROINTESTINAL: Soft and nontender with normal bowel sounds. The liver span was approximately 5-6 cm in the right midclavicular line by percussion. The liver edge was nontender. The spleen was not palpable. There were no inguinal or umbilical hernias noted. No ascites was noted.  LYMPH NODES: No lymphadenopathy was appreciated in the neck, axillae or groin.  MUSCULOSKELETAL: Gait was normal. There was no tenderness or effusions noted. Muscle strength and tone were normal. EXTREMITIES: No cyanosis, clubbing or edema.  NEUROLOGIC: Alert and oriented x 3. Normal affect. Gait was normal. Normal deep tendon reflexes with no pathological reflexes. Sensation to touch was normal.  PSYCHIATRIC: good mood, orientated to place, time and person     Labs and Imagings     No results found for any previous visit.       Imaging:     Assessment:     Principle Problem: No primary diagnosis found.   Co-morbidity/Active Problems:   Patient Active Problem List   Diagnosis    Malignant 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    Essential hypertension    Type 2 diabetes mellitus without complication, without long-term current use of insulin        Sherice Haji is a 87 y.o. female with PMH of There were no encounter diagnoses., with No primary diagnosis found.   Sherice  King Adithya is a 87 y.o. 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 ECOG 1  Normal renal function      Plan:   Overall Plan:  Concurrent chemoradiation therapy    --Labs: CBC CMP and magnesium every week  --Proceed with Rx concurrent chemotherapy cisplatin 20 mg and Taxol 50 mg weekly with radiation therapy after complete staging and workup.   --refer to Radiation Oncology; the overall plan is to concurrent chemoradiation therapy and bladder preservation.  If there is residual disease after completion of chemoradiation therapy the option will be cystectomy versus second-line immunotherapy to induce a local response in the bladder.    --return to clinic in 2 week  --refer to Dr. Todd Gordon M.D., Ph.D., [facility]  Hematology-Oncology    Signed 10/30/2019 9:47 AM   Note Ends Here

## 2019-10-30 NOTE — PROGRESS NOTES
Subjective:      Patient ID: Sherice Haji is a 87 y.o. female.    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    9/25/2019 Initial Diagnosis     Malignant 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      10/21/2019 Cancer Staged     Staging form: Urinary Bladder, AJCC 8th Edition  - Clinical: Stage II (cT2, cN0, cM0)      10/30/2019 -  Chemotherapy     Treatment Summary   Plan Name: cisplatin taxol weekly  Treatment Goal: Curative  Status: Active  Start Date: 10/30/2019 (Planned)  End Date: 12/4/2019 (Planned)  Provider: Hoda Josue NP  Chemotherapy: CISplatin (PLATINOL) 20 mg/m2 = 34 mg in sodium chloride 0.9% 500 mL chemo infusion, 20 mg/m2 = 34 mg (100 % of original dose 20 mg/m2), Intravenous, Clinic/HOD 1 time, 0 of 6 cycles  Dose modification: 20 mg/m2 (original dose 20 mg/m2, Cycle 1)  PACLitaxel (TAXOL) 50 mg/m2 = 84 mg in sodium chloride 0.9% 500 mL chemo infusion, 50 mg/m2 = 84 mg (100 % of original dose 50 mg/m2), Intravenous, Clinic/HOD 1 time, 0 of 6 cycles  Dose modification: 50 mg/m2 (original dose 50 mg/m2, Cycle 1)           Chief Complaint: Cancer (Malignant 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 )    Sherice Haji is here for cisplatin taxol weekly      Treatment Goal:   Curative      Status:   Active      Start Date:   10/30/2019 (Planned)      End Date:   12/4/2019 (Planned)      Provider:   Hoda Josue NP      Chemotherapy:   CISplatin (PLATINOL) 20 mg/m2 = 34 mg in sodium chloride 0.9% 500 mL chemo infusion, 20 mg/m2 = 34 mg (100 % of original dose 20 mg/m2), Intravenous, Clinic/HOD 1 time, 0 of 6 cycles    Dose modification: 20 mg/m2 (original dose 20 mg/m2, Cycle 1)        PACLitaxel (TAXOL) 50 mg/m2 = 84 mg in sodium chloride 0.9% 500 mL chemo infusion, 50 mg/m2 = 84 mg (100 % of original dose 50 mg/m2), Intravenous, Clinic/HOD 1 time, 0 of 6 cycles    Dose modification: 50 mg/m2 (original dose 50 mg/m2, Cycle 1)  Sherice Latif  Adithya is here for to followup for bladder cancer.  Current therapy patient doing well without new complaints she specifically denies hematuria or weight loss she has a good appetite.  This is a 87 y.o. female patient with a history of The primary encounter diagnosis was Malignant 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. Diagnoses of Essential hypertension and Type 2 diabetes mellitus without complication, without long-term current use of insulin were also pertinent to this visit., who is referred here for evaluation treatment of bladder cancer.  The patient has been feeling weak and for Tired for about 2 months.  Urinalysis shows hematuria or and she was referred to Urology.  CT scan with IV contrast demonstrates a bladder mass. Biopsy shows muscle invasive bladder cancer.     The patient lost about 10 lb over the past 2 months.  She denies any abdominal pain chest pain short of breath.     10/30/19 visit:  Today she is in clinic to discuss starting cycle 1 of weekly taxol with cisplatin. She states she began XRT today with Dr Brooks and has no c/o. Feeling good, ambu with cane and has very active lifestyle for her age. She is eager to begin tx but does not want it to negatively impact her ADLs. She does not have a PICC or [place] will set up PICC for next week prior to cycle 2.     Imagin19: CT A/P:      Past Medical History:   Diagnosis Date    Allergy     Anxiety     Arthritis     Bladder cancer     Cataract     Removed in May 2019    Depression     Diabetes mellitus     Hypertension     Malignant 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 2019    Type 2 diabetes mellitus without complication, without long-term current use of insulin 2019     Family History   Problem Relation Age of Onset    No Known Problems Mother     No Known Problems Father     Breast cancer Sister     Prostate cancer Brother     No Known Problems Maternal Aunt     No Known Problems Maternal Uncle      No Known Problems Paternal Aunt     No Known Problems Paternal Uncle     No Known Problems Maternal Grandmother     No Known Problems Maternal Grandfather     No Known Problems Paternal Grandmother     No Known Problems Paternal Grandfather     Breast cancer Sister     Prostate cancer Brother      Social History     Socioeconomic History    Marital status:      Spouse name: Not on file    Number of children: Not on file    Years of education: Not on file    Highest education level: Not on file   Occupational History    Not on file   Social Needs    Financial resource strain: Not on file    Food insecurity:     Worry: Not on file     Inability: Not on file    Transportation needs:     Medical: Not on file     Non-medical: Not on file   Tobacco Use    Smoking status: Never Smoker    Smokeless tobacco: Never Used   Substance and Sexual Activity    Alcohol use: Not Currently    Drug use: Never    Sexual activity: Not Currently   Lifestyle    Physical activity:     Days per week: Not on file     Minutes per session: Not on file    Stress: Not on file   Relationships    Social connections:     Talks on phone: Not on file     Gets together: Not on file     Attends Caoda[employer] service: Not on file     Active member of club or organization: Not on file     Attends meetings of clubs or organizations: Not on file     Relationship status: Not on file   Other Topics Concern    Not on file   Social History Narrative    Not on file     Past Surgical History:   Procedure Laterality Date    BLADDER TUMOR EXCISION  09/2019    CATARACT EXTRACTION, BILATERAL  05/2019           Review of systems:  Review of Systems   Constitutional: Positive for activity change, appetite change and unexpected weight change. Negative for chills, diaphoresis, fatigue and fever.   HENT: Negative for congestion, dental problem, mouth sores, nosebleeds, sore throat, tinnitus and voice change.    Eyes: Negative for  photophobia, discharge and visual disturbance.   Respiratory: Negative for apnea, cough, choking, chest tightness, shortness of breath, wheezing and stridor.    Cardiovascular: Negative for chest pain, palpitations and leg swelling.   Gastrointestinal: Negative for abdominal distention, abdominal pain, anal bleeding, blood in stool, constipation, diarrhea, nausea, rectal pain and vomiting.   Endocrine: Negative for cold intolerance and heat intolerance.   Genitourinary: Positive for hematuria. Negative for difficulty urinating, dysuria, menstrual problem, vaginal bleeding, vaginal discharge and vaginal pain.   Musculoskeletal: Negative for arthralgias, back pain, gait problem, joint swelling and myalgias.   Skin: Negative for color change, pallor, rash and wound.   Neurological: Negative for dizziness, syncope, light-headedness and numbness.   Hematological: Negative for adenopathy. Does not bruise/bleed easily.   Psychiatric/Behavioral: Negative for agitation, confusion, sleep disturbance and suicidal ideas. The patient is not nervous/anxious.        Objective:     Physical Exam   Constitutional: She is oriented to person, place, and time. She appears well-developed and well-nourished.   HENT:   Head: Normocephalic.   Eyes: Pupils are equal, round, and reactive to light. Conjunctivae and EOM are normal.   Neck: Normal range of motion. Neck supple.   Cardiovascular: Normal rate, regular rhythm, normal heart sounds and intact distal pulses.   Pulmonary/Chest: Effort normal and breath sounds normal. She exhibits no mass, no tenderness and no deformity. No breast swelling, tenderness or discharge. Breasts are symmetrical.   Abdominal: Soft. Bowel sounds are normal.   Genitourinary: No breast swelling, tenderness or discharge.   Musculoskeletal: Normal range of motion.   Neurological: She is alert and oriented to person, place, and time.   Skin: Skin is warm and dry.   Psychiatric: She has a normal mood and affect. Her  behavior is normal. Judgment and thought content normal.     Vitals:    10/30/19 1056   BP: (!) 160/90   Pulse: 68   Resp: 12   Temp: 97.7 °F (36.5 °C)   Body surface area is 1.72 meters squared.    Labs:  10/30/19 CBC CMP Reviewed    Assessment:      1. Malignant 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    2. Encounter for antineoplastic chemotherapy    3. Essential hypertension    4. Type 2 diabetes mellitus without complication, without long-term current use of insulin           Plan:   Malignant 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  -     CBC w/ DIFF; Standing  -     CMP; Standing  -     Magnesium; Standing      1. Today is she cleared to begin cycle 1 of weekly taxol/cisplatin. Consents have been signed and Se profile discussed.  2.She began XRT today with Dr Brooks.  3. All questions answered satisfactorily.   4. RTC weekly with cbc cmp mag.  5. Refer to IR for PICC placement, pt is not on any blood thinners.     Hoda Josue, ANP

## 2019-11-06 ENCOUNTER — OFFICE VISIT (OUTPATIENT)
Dept: HEMATOLOGY/ONCOLOGY | Facility: CLINIC | Age: 84
End: 2019-11-06
Payer: COMMERCIAL

## 2019-11-06 VITALS
RESPIRATION RATE: 10 BRPM | WEIGHT: 150.19 LBS | TEMPERATURE: 98 F | HEART RATE: 84 BPM | SYSTOLIC BLOOD PRESSURE: 166 MMHG | HEIGHT: 63 IN | DIASTOLIC BLOOD PRESSURE: 82 MMHG | BODY MASS INDEX: 26.61 KG/M2 | OXYGEN SATURATION: 84 %

## 2019-11-06 DIAGNOSIS — D70.1 CHEMOTHERAPY-INDUCED NEUTROPENIA: ICD-10-CM

## 2019-11-06 DIAGNOSIS — T45.1X5A CHEMOTHERAPY-INDUCED NAUSEA: Primary | ICD-10-CM

## 2019-11-06 DIAGNOSIS — T45.1X5A CHEMOTHERAPY-INDUCED NEUTROPENIA: ICD-10-CM

## 2019-11-06 DIAGNOSIS — Z51.11 ENCOUNTER FOR ANTINEOPLASTIC CHEMOTHERAPY: ICD-10-CM

## 2019-11-06 DIAGNOSIS — C67.3 MALIGNANT NEOPLASM OF ANTERIOR WALL OF URINARY BLADDER: ICD-10-CM

## 2019-11-06 DIAGNOSIS — R11.0 CHEMOTHERAPY-INDUCED NAUSEA: Primary | ICD-10-CM

## 2019-11-06 PROCEDURE — 99214 OFFICE O/P EST MOD 30 MIN: CPT | Mod: S$GLB,,, | Performed by: NURSE PRACTITIONER

## 2019-11-06 PROCEDURE — 1101F PT FALLS ASSESS-DOCD LE1/YR: CPT | Mod: CPTII,S$GLB,, | Performed by: NURSE PRACTITIONER

## 2019-11-06 PROCEDURE — 99214 PR OFFICE/OUTPT VISIT, EST, LEVL IV, 30-39 MIN: ICD-10-PCS | Mod: S$GLB,,, | Performed by: NURSE PRACTITIONER

## 2019-11-06 PROCEDURE — 1101F PR PT FALLS ASSESS DOC 0-1 FALLS W/OUT INJ PAST YR: ICD-10-PCS | Mod: CPTII,S$GLB,, | Performed by: NURSE PRACTITIONER

## 2019-11-06 RX ORDER — ONDANSETRON HYDROCHLORIDE 8 MG/1
8 TABLET, FILM COATED ORAL EVERY 12 HOURS PRN
Qty: 30 TABLET | Refills: 2 | Status: SHIPPED | OUTPATIENT
Start: 2019-11-06 | End: 2019-11-06

## 2019-11-06 RX ORDER — ONDANSETRON HYDROCHLORIDE 8 MG/1
8 TABLET, FILM COATED ORAL EVERY 12 HOURS PRN
Qty: 30 TABLET | Refills: 2 | Status: SHIPPED | OUTPATIENT
Start: 2019-11-06 | End: 2020-11-05

## 2019-11-06 RX ORDER — SODIUM CHLORIDE 0.9 % (FLUSH) 0.9 %
10 SYRINGE (ML) INJECTION
Status: CANCELLED | OUTPATIENT
Start: 2019-11-07

## 2019-11-06 RX ORDER — HEPARIN 100 UNIT/ML
500 SYRINGE INTRAVENOUS
Status: CANCELLED | OUTPATIENT
Start: 2019-11-07

## 2019-11-06 RX ORDER — FAMOTIDINE 10 MG/ML
20 INJECTION INTRAVENOUS
Status: CANCELLED | OUTPATIENT
Start: 2019-11-07

## 2019-11-06 RX ORDER — EPINEPHRINE 0.3 MG/.3ML
0.3 INJECTION SUBCUTANEOUS ONCE AS NEEDED
Status: CANCELLED | OUTPATIENT
Start: 2019-11-07

## 2019-11-06 RX ORDER — DIPHENHYDRAMINE HYDROCHLORIDE 50 MG/ML
50 INJECTION INTRAMUSCULAR; INTRAVENOUS ONCE AS NEEDED
Status: CANCELLED | OUTPATIENT
Start: 2019-11-07

## 2019-11-06 NOTE — PROGRESS NOTES
Subjective:      Patient ID: Sherice Haji is a 87 y.o. female.    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    9/25/2019 Initial Diagnosis     Malignant 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      10/21/2019 Cancer Staged     Staging form: Urinary Bladder, AJCC 8th Edition  - Clinical: Stage II (cT2, cN0, cM0)      10/31/2019 -  Chemotherapy     Treatment Summary   Plan Name: cisplatin taxol weekly  Treatment Goal: Curative  Status: Active  Start Date: 10/31/2019  End Date: 12/5/2019 (Planned)  Provider: Hoda Josue NP  Chemotherapy: CISplatin (PLATINOL) 20 mg/m2 = 34 mg in sodium chloride 0.9% 500 mL chemo infusion, 20 mg/m2 = 34 mg (100 % of original dose 20 mg/m2), Intravenous, Clinic/HOD 1 time, 1 of 6 cycles  Dose modification: 20 mg/m2 (original dose 20 mg/m2, Cycle 1)  PACLitaxel (TAXOL) 50 mg/m2 = 84 mg in sodium chloride 0.9% 500 mL chemo infusion, 50 mg/m2 = 84 mg (100 % of original dose 50 mg/m2), Intravenous, Clinic/HOD 1 time, 1 of 6 cycles  Dose modification: 50 mg/m2 (original dose 50 mg/m2, Cycle 1)           Chief Complaint: Maligant 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    Sherice Haji is here for cisplatin taxol weekly      Treatment Goal:   Curative      Status:   Active      Start Date:   10/31/2019      End Date:   12/5/2019 (Planned)      Provider:   Hoda Josue NP      Chemotherapy:   CISplatin (PLATINOL) 20 mg/m2 = 34 mg in sodium chloride 0.9% 500 mL chemo infusion, 20 mg/m2 = 34 mg (100 % of original dose 20 mg/m2), Intravenous, Clinic/HOD 1 time, 1 of 6 cycles    Dose modification: 20 mg/m2 (original dose 20 mg/m2, Cycle 1)        PACLitaxel (TAXOL) 50 mg/m2 = 84 mg in sodium chloride 0.9% 500 mL chemo infusion, 50 mg/m2 = 84 mg (100 % of original dose 50 mg/m2), Intravenous, Clinic/HOD 1 time, 1 of 6 cycles    Dose modification: 50 mg/m2 (original dose 50 mg/m2, Cycle 1)  Sherice Haji is here for to followup for  bladder cancer.  Current therapy patient doing well without new complaints she specifically denies hematuria or weight loss she has a good appetite.  This is a 87 y.o. female patient with a history of The primary encounter diagnosis was Malignant 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. Diagnoses of Essential hypertension and Type 2 diabetes mellitus without complication, without long-term current use of insulin were also pertinent to this visit., who is referred here for evaluation treatment of bladder cancer.  The patient has been feeling weak and for Tired for about 2 months.  Urinalysis shows hematuria or and she was referred to Urology.  CT scan with IV contrast demonstrates a bladder mass. Biopsy shows muscle invasive bladder cancer.     The patient lost about 10 lb over the past 2 months.  She denies any abdominal pain chest pain short of breath.     10/30/19 visit:  Today she is in clinic to discuss starting cycle 1 of weekly taxol with cisplatin. She states she began XRT today with Dr Brooks and has no c/o. Feeling good, ambu with cane and has very active lifestyle for her age. She is eager to begin tx but does not want it to negatively impact her ADLs. She does not have a PICC or [place] will set up PICC for next week prior to cycle 2.     19 visit;  Today she is in clinic after cycle 1 of weekly taxol/cis. She states she tolerated well but did have some mild nausea. She received her PICC line and infusion will do weekly dressing changes. Labs reviewed WBC 2.6  ANC 1.2 will proceed with chemo as planned for tomorrow but will closely monitor neutropenia.     Imagin19: CT A/P:      Past Medical History:   Diagnosis Date    Allergy     Anxiety     Arthritis     Bladder cancer     Cataract     Removed in May 2019    Depression     Diabetes mellitus     Hypertension     Malignant 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 2019    Type 2 diabetes mellitus without complication,  without long-term current use of insulin 9/25/2019     Family History   Problem Relation Age of Onset    No Known Problems Mother     No Known Problems Father     Breast cancer Sister     Prostate cancer Brother     No Known Problems Maternal Aunt     No Known Problems Maternal Uncle     No Known Problems Paternal Aunt     No Known Problems Paternal Uncle     No Known Problems Maternal Grandmother     No Known Problems Maternal Grandfather     No Known Problems Paternal Grandmother     No Known Problems Paternal Grandfather     Breast cancer Sister     Prostate cancer Brother      Social History     Socioeconomic History    Marital status:      Spouse name: Not on file    Number of children: Not on file    Years of education: Not on file    Highest education level: Not on file   Occupational History    Not on file   Social Needs    Financial resource strain: Not on file    Food insecurity:     Worry: Not on file     Inability: Not on file    Transportation needs:     Medical: Not on file     Non-medical: Not on file   Tobacco Use    Smoking status: Never Smoker    Smokeless tobacco: Never Used   Substance and Sexual Activity    Alcohol use: Not Currently    Drug use: Never    Sexual activity: Not Currently   Lifestyle    Physical activity:     Days per week: Not on file     Minutes per session: Not on file    Stress: Not on file   Relationships    Social connections:     Talks on phone: Not on file     Gets together: Not on file     Attends Orthodoxy service: Not on file     Active member of club or organization: Not on file     Attends meetings of clubs or organizations: Not on file     Relationship status: Not on file   Other Topics Concern    Not on file   Social History Narrative    Not on file     Past Surgical History:   Procedure Laterality Date    BLADDER TUMOR EXCISION  09/2019    CATARACT EXTRACTION, BILATERAL  05/2019           Review of systems:  Review of Systems    Constitutional: Positive for activity change, appetite change and unexpected weight change. Negative for chills, diaphoresis, fatigue and fever.   HENT: Negative for congestion, dental problem, mouth sores, nosebleeds, sore throat, tinnitus and voice change.    Eyes: Negative for photophobia, discharge and visual disturbance.   Respiratory: Negative for apnea, cough, choking, chest tightness, shortness of breath, wheezing and stridor.    Cardiovascular: Negative for chest pain, palpitations and leg swelling.   Gastrointestinal: Negative for abdominal distention, abdominal pain, anal bleeding, blood in stool, constipation, diarrhea, nausea, rectal pain and vomiting.   Endocrine: Negative for cold intolerance and heat intolerance.   Genitourinary: Positive for hematuria. Negative for difficulty urinating, dysuria, menstrual problem, vaginal bleeding, vaginal discharge and vaginal pain.   Musculoskeletal: Negative for arthralgias, back pain, gait problem, joint swelling and myalgias.   Skin: Negative for color change, pallor, rash and wound.   Neurological: Negative for dizziness, syncope, light-headedness and numbness.   Hematological: Negative for adenopathy. Does not bruise/bleed easily.   Psychiatric/Behavioral: Negative for agitation, confusion, sleep disturbance and suicidal ideas. The patient is not nervous/anxious.        Objective:     Physical Exam   Constitutional: She is oriented to person, place, and time. She appears well-developed and well-nourished.   HENT:   Head: Normocephalic.   Eyes: Pupils are equal, round, and reactive to light. Conjunctivae and EOM are normal.   Neck: Normal range of motion. Neck supple.   Cardiovascular: Normal rate, regular rhythm, normal heart sounds and intact distal pulses.   Pulmonary/Chest: Effort normal and breath sounds normal. She exhibits no mass, no tenderness and no deformity. No breast swelling, tenderness or discharge. Breasts are symmetrical.   Abdominal: Soft.  Bowel sounds are normal.   Musculoskeletal: Normal range of motion.   Neurological: She is alert and oriented to person, place, and time.   Skin: Skin is warm and dry.   Psychiatric: She has a normal mood and affect. Her behavior is normal. Judgment and thought content normal.     Vitals:    11/06/19 0830   BP: (!) 166/82   Pulse: 84   Resp: 10   Temp: 97.8 °F (36.6 °C)   Body surface area is 1.74 meters squared.    Labs:  11/5/19  WBC 2.5 ANC 1.2 Hgb 11.6  Na 132, K 4.6 mG 2.1 LFT WNL Cr 0.90    Assessment:      1. Chemotherapy-induced nausea    2. Malignant 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    3. Encounter for antineoplastic chemotherapy    4. Chemotherapy-induced neutropenia           Plan:   There are no diagnoses linked to this encounter.  1. Today is she cleared cor cycle 2 of weekly taxol/cisplatin. Consents have been signed and Se profile discussed.  2.She is to continue XRT with Dr Brooks.  3. All questions answered satisfactorily.   4. RTC weekly with cbc cmp mag.       Hoda Josue, ANP

## 2019-11-14 ENCOUNTER — OFFICE VISIT (OUTPATIENT)
Dept: HEMATOLOGY/ONCOLOGY | Facility: CLINIC | Age: 84
End: 2019-11-14
Payer: COMMERCIAL

## 2019-11-14 VITALS
DIASTOLIC BLOOD PRESSURE: 70 MMHG | TEMPERATURE: 97 F | RESPIRATION RATE: 12 BRPM | BODY MASS INDEX: 26.43 KG/M2 | HEART RATE: 91 BPM | OXYGEN SATURATION: 95 % | SYSTOLIC BLOOD PRESSURE: 160 MMHG | HEIGHT: 63 IN | WEIGHT: 149.19 LBS

## 2019-11-14 DIAGNOSIS — C67.3 MALIGNANT NEOPLASM OF ANTERIOR WALL OF URINARY BLADDER: ICD-10-CM

## 2019-11-14 DIAGNOSIS — Z51.11 ENCOUNTER FOR ANTINEOPLASTIC CHEMOTHERAPY: ICD-10-CM

## 2019-11-14 DIAGNOSIS — T45.1X5A CHEMOTHERAPY-INDUCED NAUSEA: Primary | ICD-10-CM

## 2019-11-14 DIAGNOSIS — E11.9 TYPE 2 DIABETES MELLITUS WITHOUT COMPLICATION, WITHOUT LONG-TERM CURRENT USE OF INSULIN: ICD-10-CM

## 2019-11-14 DIAGNOSIS — R11.0 CHEMOTHERAPY-INDUCED NAUSEA: Primary | ICD-10-CM

## 2019-11-14 PROCEDURE — 99214 OFFICE O/P EST MOD 30 MIN: CPT | Mod: S$GLB,,, | Performed by: NURSE PRACTITIONER

## 2019-11-14 PROCEDURE — 99214 PR OFFICE/OUTPT VISIT, EST, LEVL IV, 30-39 MIN: ICD-10-PCS | Mod: S$GLB,,, | Performed by: NURSE PRACTITIONER

## 2019-11-14 PROCEDURE — 1101F PR PT FALLS ASSESS DOC 0-1 FALLS W/OUT INJ PAST YR: ICD-10-PCS | Mod: CPTII,S$GLB,, | Performed by: NURSE PRACTITIONER

## 2019-11-14 PROCEDURE — 1101F PT FALLS ASSESS-DOCD LE1/YR: CPT | Mod: CPTII,S$GLB,, | Performed by: NURSE PRACTITIONER

## 2019-11-14 RX ORDER — DIPHENHYDRAMINE HYDROCHLORIDE 50 MG/ML
50 INJECTION INTRAMUSCULAR; INTRAVENOUS ONCE AS NEEDED
Status: CANCELLED | OUTPATIENT
Start: 2019-11-14

## 2019-11-14 RX ORDER — EPINEPHRINE 0.3 MG/.3ML
0.3 INJECTION SUBCUTANEOUS ONCE AS NEEDED
Status: CANCELLED | OUTPATIENT
Start: 2019-11-14

## 2019-11-14 RX ORDER — SODIUM CHLORIDE 0.9 % (FLUSH) 0.9 %
10 SYRINGE (ML) INJECTION
Status: CANCELLED | OUTPATIENT
Start: 2019-11-14

## 2019-11-14 RX ORDER — HEPARIN 100 UNIT/ML
500 SYRINGE INTRAVENOUS
Status: CANCELLED | OUTPATIENT
Start: 2019-11-14

## 2019-11-14 RX ORDER — FAMOTIDINE 10 MG/ML
20 INJECTION INTRAVENOUS
Status: CANCELLED | OUTPATIENT
Start: 2019-11-14

## 2019-11-14 NOTE — PROGRESS NOTES
Subjective:      Patient ID: Sherice Haji is a 87 y.o. female.    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    9/25/2019 Initial Diagnosis     Malignant 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      10/21/2019 Cancer Staged     Staging form: Urinary Bladder, AJCC 8th Edition  - Clinical: Stage II (cT2, cN0, cM0)      10/31/2019 -  Chemotherapy     Treatment Summary   Plan Name: cisplatin taxol weekly  Treatment Goal: Curative  Status: Active  Start Date: 10/31/2019  End Date: 12/5/2019 (Planned)  Provider: Hoda Josue NP  Chemotherapy: CISplatin (PLATINOL) 20 mg/m2 = 34 mg in sodium chloride 0.9% 500 mL chemo infusion, 20 mg/m2 = 34 mg (100 % of original dose 20 mg/m2), Intravenous, Clinic/HOD 1 time, 1 of 6 cycles  Dose modification: 20 mg/m2 (original dose 20 mg/m2, Cycle 1)  PACLitaxel (TAXOL) 50 mg/m2 = 84 mg in sodium chloride 0.9% 500 mL chemo infusion, 50 mg/m2 = 84 mg (100 % of original dose 50 mg/m2), Intravenous, Clinic/HOD 1 time, 1 of 6 cycles  Dose modification: 50 mg/m2 (original dose 50 mg/m2, Cycle 1)           Chief Complaint: Bladder Cancer    Sherice Haji is here for cisplatin taxol weekly      Treatment Goal:   Curative      Status:   Active      Start Date:   10/31/2019      End Date:   12/5/2019 (Planned)      Provider:   Hoda Josue NP      Chemotherapy:   CISplatin (PLATINOL) 20 mg/m2 = 34 mg in sodium chloride 0.9% 500 mL chemo infusion, 20 mg/m2 = 34 mg (100 % of original dose 20 mg/m2), Intravenous, Clinic/HOD 1 time, 1 of 6 cycles    Dose modification: 20 mg/m2 (original dose 20 mg/m2, Cycle 1)        PACLitaxel (TAXOL) 50 mg/m2 = 84 mg in sodium chloride 0.9% 500 mL chemo infusion, 50 mg/m2 = 84 mg (100 % of original dose 50 mg/m2), Intravenous, Clinic/HOD 1 time, 1 of 6 cycles    Dose modification: 50 mg/m2 (original dose 50 mg/m2, Cycle 1)  Sherice Haji is here for to followup for bladder cancer.  Current therapy  patient doing well without new complaints she specifically denies hematuria or weight loss she has a good appetite.  This is a 87 y.o. female patient with a history of The primary encounter diagnosis was Malignant 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. Diagnoses of Essential hypertension and Type 2 diabetes mellitus without complication, without long-term current use of insulin were also pertinent to this visit., who is referred here for evaluation treatment of bladder cancer.  The patient has been feeling weak and for Tired for about 2 months.  Urinalysis shows hematuria or and she was referred to Urology.  CT scan with IV contrast demonstrates a bladder mass. Biopsy shows muscle invasive bladder cancer.     The patient lost about 10 lb over the past 2 months.  She denies any abdominal pain chest pain short of breath.     10/30/19 visit:  Today she is in clinic to discuss starting cycle 1 of weekly taxol with cisplatin. She states she began XRT today with Dr Brooks and has no c/o. Feeling good, ambu with cane and has very active lifestyle for her age. She is eager to begin tx but does not want it to negatively impact her ADLs. She does not have a PICC or [place] will set up PICC for next week prior to cycle 2.     11/6/19 visit;  Today she is in clinic after cycle 1 of weekly taxol/cis. She states she tolerated well but did have some mild nausea. She received her PICC line and infusion will do weekly dressing changes. Labs reviewed WBC 2.6  ANC 1.2 will proceed with chemo as planned for tomorrow but will closely monitor neutropenia.     11/14/19 visit:  Today she is here for consideration of cycle 3 of 6 cis/taxol with weekly XRt. She is tolerating tx very well with mid nausea noted but controlled with meds. She states her am FGLU is >200 recently, explained likely due to pre-med steroids. Will help facilitate appt with PCP for BS control. Otherwise, pt is cleared for tx today and will RTC in 1 week with  labs. She is to continue XRT with Dr Brooks.      Imagin19: CT A/P:      Past Medical History:   Diagnosis Date    Allergy     Anxiety     Arthritis     Bladder cancer     Cataract     Removed in May 2019    Depression     Diabetes mellitus     Hypertension     Malignant 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 2019    Type 2 diabetes mellitus without complication, without long-term current use of insulin 2019     Family History   Problem Relation Age of Onset    No Known Problems Mother     No Known Problems Father     Breast cancer Sister     Prostate cancer Brother     No Known Problems Maternal Aunt     No Known Problems Maternal Uncle     No Known Problems Paternal Aunt     No Known Problems Paternal Uncle     No Known Problems Maternal Grandmother     No Known Problems Maternal Grandfather     No Known Problems Paternal Grandmother     No Known Problems Paternal Grandfather     Breast cancer Sister     Prostate cancer Brother      Social History     Socioeconomic History    Marital status:      Spouse name: Not on file    Number of children: Not on file    Years of education: Not on file    Highest education level: Not on file   Occupational History    Not on file   Social Needs    Financial resource strain: Not on file    Food insecurity:     Worry: Not on file     Inability: Not on file    Transportation needs:     Medical: Not on file     Non-medical: Not on file   Tobacco Use    Smoking status: Never Smoker    Smokeless tobacco: Never Used   Substance and Sexual Activity    Alcohol use: Not Currently    Drug use: Never    Sexual activity: Not Currently   Lifestyle    Physical activity:     Days per week: Not on file     Minutes per session: Not on file    Stress: Not on file   Relationships    Social connections:     Talks on phone: Not on file     Gets together: Not on file     Attends [employer] service: Not on file     Active member of  club or organization: Not on file     Attends meetings of clubs or organizations: Not on file     Relationship status: Not on file   Other Topics Concern    Not on file   Social History Narrative    Not on file     Past Surgical History:   Procedure Laterality Date    BLADDER TUMOR EXCISION  09/2019    CATARACT EXTRACTION, BILATERAL  05/2019           Review of systems:  Review of Systems   Constitutional: Positive for activity change, appetite change and unexpected weight change. Negative for chills, diaphoresis, fatigue and fever.   HENT: Negative for congestion, dental problem, mouth sores, nosebleeds, sore throat, tinnitus and voice change.    Eyes: Negative for photophobia, discharge and visual disturbance.   Respiratory: Negative for apnea, cough, choking, chest tightness, shortness of breath, wheezing and stridor.    Cardiovascular: Negative for chest pain, palpitations and leg swelling.   Gastrointestinal: Negative for abdominal distention, abdominal pain, anal bleeding, blood in stool, constipation, diarrhea, nausea, rectal pain and vomiting.   Endocrine: Negative for cold intolerance and heat intolerance.   Genitourinary: Positive for hematuria. Negative for difficulty urinating, dysuria, menstrual problem, vaginal bleeding, vaginal discharge and vaginal pain.   Musculoskeletal: Negative for arthralgias, back pain, gait problem, joint swelling and myalgias.   Skin: Negative for color change, pallor, rash and wound.   Neurological: Negative for dizziness, syncope, light-headedness and numbness.   Hematological: Negative for adenopathy. Does not bruise/bleed easily.   Psychiatric/Behavioral: Negative for agitation, confusion, sleep disturbance and suicidal ideas. The patient is not nervous/anxious.        Objective:     Physical Exam   Constitutional: She is oriented to person, place, and time. She appears well-developed and well-nourished.   HENT:   Head: Normocephalic.   Eyes: Pupils are equal, round,  and reactive to light. Conjunctivae and EOM are normal.   Neck: Normal range of motion. Neck supple.   Cardiovascular: Normal rate, regular rhythm, normal heart sounds and intact distal pulses.   Pulmonary/Chest: Effort normal and breath sounds normal. She exhibits no mass, no tenderness and no deformity. No breast swelling, tenderness or discharge. Breasts are symmetrical.   Abdominal: Soft. Bowel sounds are normal.   Musculoskeletal: Normal range of motion.   Neurological: She is alert and oriented to person, place, and time.   Skin: Skin is warm and dry.   Psychiatric: She has a normal mood and affect. Her behavior is normal. Judgment and thought content normal.     Vitals:    11/14/19 0905   BP: (!) 160/70   Pulse:    Resp:    Temp:    Body surface area is 1.73 meters squared.  Wt Readings from Last 3 Encounters:   11/14/19 67.7 kg (149 lb 3.2 oz)   11/06/19 68.1 kg (150 lb 3.2 oz)   10/30/19 66.9 kg (147 lb 6.4 oz)     Temp Readings from Last 3 Encounters:   11/14/19 96.7 °F (35.9 °C) (Temporal)   11/06/19 97.8 °F (36.6 °C) (Temporal)   10/30/19 97.7 °F (36.5 °C) (Temporal)     BP Readings from Last 3 Encounters:   11/14/19 (!) 160/70   11/06/19 (!) 166/82   10/30/19 (!) 160/90     Pulse Readings from Last 3 Encounters:   11/14/19 91   11/06/19 84   10/30/19 68     Labs:  11/5/19  WBC 2.5 ANC 1.2 Hgb 11.6  Na 132, K 4.6 mG 2.1 LFT WNL Cr 0.90    11/12/19 WBC 2.4 HGB 10.8 Hct 32.1  ANC 1.8 Na 132 Na 4.1 Cr 0.979 mg 1.7 LFT WNL FGLU 262    Assessment:      1. Chemotherapy-induced nausea    2. Type 2 diabetes mellitus without complication, without long-term current use of insulin    3. Encounter for antineoplastic chemotherapy    4. Malignant 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           Plan:   Chemotherapy-induced nausea    Type 2 diabetes mellitus without complication, without long-term current use of insulin    Encounter for antineoplastic chemotherapy    Malignant neoplasm of anterior  wall of urinary bladder      1. Today is she cleared cor cycle 3 of 6 weekly taxol/cisplatin. Consents have been signed and Se profile discussed. Mild nausea report but controlled with meds.  2.She is to continue XRT with Dr Brooks.  3. Elevated FGLU noted by patient at home, >200. She is to see her PCP tomorrow at 8 am for medication adjustments. Likely due to premed decadron 12 mg but unable to lower dose due to SE profile of Taxol.   4. RTC weekly with cbc cmp mag.       Hoda Josue, ANP

## 2019-11-21 ENCOUNTER — OFFICE VISIT (OUTPATIENT)
Dept: HEMATOLOGY/ONCOLOGY | Facility: CLINIC | Age: 84
End: 2019-11-21
Payer: COMMERCIAL

## 2019-11-21 VITALS
BODY MASS INDEX: 26.28 KG/M2 | TEMPERATURE: 99 F | DIASTOLIC BLOOD PRESSURE: 70 MMHG | SYSTOLIC BLOOD PRESSURE: 140 MMHG | HEIGHT: 63 IN | OXYGEN SATURATION: 96 % | WEIGHT: 148.31 LBS | HEART RATE: 87 BPM | RESPIRATION RATE: 14 BRPM

## 2019-11-21 DIAGNOSIS — C67.3 MALIGNANT NEOPLASM OF ANTERIOR WALL OF URINARY BLADDER: ICD-10-CM

## 2019-11-21 DIAGNOSIS — E11.9 TYPE 2 DIABETES MELLITUS WITHOUT COMPLICATION, WITHOUT LONG-TERM CURRENT USE OF INSULIN: Primary | ICD-10-CM

## 2019-11-21 DIAGNOSIS — I10 ESSENTIAL HYPERTENSION: ICD-10-CM

## 2019-11-21 PROCEDURE — 1126F PR PAIN SEVERITY QUANTIFIED, NO PAIN PRESENT: ICD-10-PCS | Mod: S$GLB,,, | Performed by: INTERNAL MEDICINE

## 2019-11-21 PROCEDURE — 1126F AMNT PAIN NOTED NONE PRSNT: CPT | Mod: S$GLB,,, | Performed by: INTERNAL MEDICINE

## 2019-11-21 PROCEDURE — 1101F PR PT FALLS ASSESS DOC 0-1 FALLS W/OUT INJ PAST YR: ICD-10-PCS | Mod: CPTII,S$GLB,, | Performed by: INTERNAL MEDICINE

## 2019-11-21 PROCEDURE — 99215 PR OFFICE/OUTPT VISIT, EST, LEVL V, 40-54 MIN: ICD-10-PCS | Mod: S$GLB,,, | Performed by: INTERNAL MEDICINE

## 2019-11-21 PROCEDURE — 1101F PT FALLS ASSESS-DOCD LE1/YR: CPT | Mod: CPTII,S$GLB,, | Performed by: INTERNAL MEDICINE

## 2019-11-21 PROCEDURE — 1159F MED LIST DOCD IN RCRD: CPT | Mod: S$GLB,,, | Performed by: INTERNAL MEDICINE

## 2019-11-21 PROCEDURE — 99215 OFFICE O/P EST HI 40 MIN: CPT | Mod: S$GLB,,, | Performed by: INTERNAL MEDICINE

## 2019-11-21 PROCEDURE — 1159F PR MEDICATION LIST DOCUMENTED IN MEDICAL RECORD: ICD-10-PCS | Mod: S$GLB,,, | Performed by: INTERNAL MEDICINE

## 2019-11-21 RX ORDER — SODIUM CHLORIDE 0.9 % (FLUSH) 0.9 %
10 SYRINGE (ML) INJECTION
Status: CANCELLED | OUTPATIENT
Start: 2019-11-21

## 2019-11-21 RX ORDER — EPINEPHRINE 0.3 MG/.3ML
0.3 INJECTION SUBCUTANEOUS ONCE AS NEEDED
Status: CANCELLED | OUTPATIENT
Start: 2019-11-21

## 2019-11-21 RX ORDER — HEPARIN 100 UNIT/ML
500 SYRINGE INTRAVENOUS
Status: CANCELLED | OUTPATIENT
Start: 2019-11-21

## 2019-11-21 RX ORDER — FAMOTIDINE 10 MG/ML
20 INJECTION INTRAVENOUS
Status: CANCELLED | OUTPATIENT
Start: 2019-11-21

## 2019-11-21 RX ORDER — DIPHENHYDRAMINE HYDROCHLORIDE 50 MG/ML
50 INJECTION INTRAMUSCULAR; INTRAVENOUS ONCE AS NEEDED
Status: CANCELLED | OUTPATIENT
Start: 2019-11-21

## 2019-11-21 NOTE — PROGRESS NOTES
Hematology Oncology Progress Note    Hematology Oncology  [facility]     PATIENT: Sherice Haji  : 1932 87 y.o. female  MRN [id number]     PCP: Sejal Castillo MD  Consult Requested By:  No att. providers found    Date of Service: 2019    Subjective:   Interim History:  Malignant neoplasm of anterior wall of Urinary bladder    Sherice Haji is here for to followup for bladder cancer.  Current therapy patient doing well without new complaints she specifically denies hematuria or weight loss she has a good appetite.  This is a 87 y.o. female patient with a history of The primary encounter diagnosis was Malignant 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. Diagnoses of Essential hypertension and Type 2 diabetes mellitus without complication, without long-term current use of insulin were also pertinent to this visit., who is referred here for evaluation treatment of bladder cancer.  The patient has been feeling weak and for T for about 2 months.  Urinalysis shows hematuria or and she was referred to Urology.  CT scan with IV contrast demonstrates a bladder mass. Biopsy shows muscle invasive bladder cancer.     The patient lost about 10 lb over the past 2 months.  She denies any abdominal pain chest pain short of breath.        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    2019 Initial Diagnosis     Malignant 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      10/21/2019 Cancer Staged     Staging form: Urinary Bladder, AJCC 8th Edition  - Clinical: Stage II (cT2, cN0, cM0)      10/31/2019 -  Chemotherapy     Treatment Summary   Plan Name: cisplatin taxol weekly  Treatment Goal: Curative  Status: Active  Start Date: 10/31/2019  End Date: 2019 (Planned)  Provider: Hoda Josue NP  Chemotherapy: CISplatin (PLATINOL) 20 mg/m2 = 34 mg in sodium chloride 0.9% 500 mL chemo infusion, 20 mg/m2 = 34 mg (100 % of original dose 20 mg/m2),  Intravenous, Clinic/HOD 1 time, 3 of 6 cycles  Dose modification: 20 mg/m2 (original dose 20 mg/m2, Cycle 1)  PACLitaxel (TAXOL) 50 mg/m2 = 84 mg in sodium chloride 0.9% 500 mL chemo infusion, 50 mg/m2 = 84 mg (100 % of original dose 50 mg/m2), Intravenous, Clinic/HOD 1 time, 3 of 6 cycles  Dose modification: 50 mg/m2 (original dose 50 mg/m2, Cycle 1)         Oncologic History:      Oncologic History     Oncologic Treatment     Pathology      Past Medical History:   Past Medical History:   Diagnosis Date    Allergy     Anxiety     Arthritis     Bladder cancer     Cataract     Removed in May 2019    Depression     Diabetes mellitus     Hypertension     Malignant 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 9/25/2019    Type 2 diabetes mellitus without complication, without long-term current use of insulin 9/25/2019       Past Surgical HIstory:   Past Surgical History:   Procedure Laterality Date    BLADDER TUMOR EXCISION  09/2019    CATARACT EXTRACTION, BILATERAL  05/2019       Allergies:  Review of patient's allergies indicates:   Allergen Reactions    Aspirin        Medications:  Current Outpatient Medications   Medication Sig Dispense Refill    glimepiride (AMARYL) 2 MG tablet       HYDROcodone-acetaminophen (NORCO) 5-325 mg per tablet       indapamide (LOZOL) 2.5 MG Tab       levothyroxine (SYNTHROID) 50 MCG tablet       loratadine (CLARITIN) 10 mg tablet       losartan (COZAAR) 25 MG tablet       meloxicam (MOBIC) 7.5 MG tablet       metFORMIN (GLUCOPHAGE-XR) 500 MG 24 hr tablet       metoprolol tartrate (LOPRESSOR) 50 MG tablet       ondansetron (ZOFRAN) 8 MG tablet Take 1 tablet (8 mg total) by mouth every 12 (twelve) hours as needed for Nausea. 30 tablet 2    simvastatin (ZOCOR) 20 MG tablet        No current facility-administered medications for this visit.        Family History:   Family History   Problem Relation Age of Onset    No Known Problems Mother     No Known Problems  "Father     Breast cancer Sister     Prostate cancer Brother     No Known Problems Maternal Aunt     No Known Problems Maternal Uncle     No Known Problems Paternal Aunt     No Known Problems Paternal Uncle     No Known Problems Maternal Grandmother     No Known Problems Maternal Grandfather     No Known Problems Paternal Grandmother     No Known Problems Paternal Grandfather     Breast cancer Sister     Prostate cancer Brother        Social History:  reports that she has never smoked. She has never used smokeless tobacco. She reports that she drank alcohol. She reports that she does not use drugs.    Review of Systemas  Constitutional: No change in weight, appetie, fatigue, fever, or night sweats  Eyes: No changes in vision  Ears, Nose, Mouth, Throat, and Face: No hearing problems, ear pain, rummy nose, or sore throat  Respiratory: No shortness of breath or cough  Cardiovascular: No chest pain, palpitations or dizziness  Gastrointestinal: No abdominal pain, hematochezia, melena  Genitourinary: No dysuria, hematuria, nocturia, menstrual problems, post menopausal  Integumentary/Breast: No rashes or itching  Hematologic/Lymphatic: No anemia or bleeding abnormalities  Musculoskeletal: No joint or muscle abnormalities  Neurological: No sensory or motor problems, no headaches  Behavioral/Psych: No depression, anxiety, cognitive problems, or stress  Endocrine: No diabetes or thyroid problems  Allergic/Immunologic: See allergy above    Physical Exam      Vitals:   Vitals:    11/21/19 0846   BP: (!) 140/70   BP Location: Right arm   Patient Position: Sitting   BP Method: Large (Manual)   Pulse: 87   Resp: 14   Temp: 98.6 °F (37 °C)   TempSrc: Temporal   SpO2: 96%   Weight: 67.3 kg (148 lb 4.8 oz)   Height: 5' 3" (1.6 m)     BMI: Body mass index is 26.27 kg/m².  BSA Body surface area is 1.73 meters squared.  ECOG Performance Status:   ECOG SCORE    0 - Fully active-able to carry on all pre-disease performance " without restriction      ECOG 1    GENERAL APPEARANCE: Well developed, well nourished, in no acute distress.  SKIN: Inspection of the skin reveals no rashes, ulcerations or petechiae.  HEENT: The sclerae were anicteric and conjunctivae were pink and moist. Extraocular movements were intact and pupils were equal, round, and reactive to light with normal accommodation. External inspection of the ears and nose showed no scars, lesions, or masses. Lips, teeth, and gums showed normal mucosa. The oral mucosa, hard and soft palate, tongue and posterior pharynx were normal.  NECK: Supple and symmetric. There was no thyroid enlargement, and no tenderness, or masses were felt.  CRESPIRATORY: Normal AP diameter and normal contour without any kyphoscoliosis. LUNGS: Auscultation of the lungs revealed normal breath sounds without any other adventitious sounds or rubs.  CARDIOVASCULAR: There was a regular rate and rhythm without any murmurs, gallops, rubs. The carotid pulses were normal and 2+ bilaterally without bruits. Peripheral pulses were 2+ and symmetric.  GASTROINTESTINAL: Soft and nontender with normal bowel sounds. The liver span was approximately 5-6 cm in the right midclavicular line by percussion. The liver edge was nontender. The spleen was not palpable. There were no inguinal or umbilical hernias noted. No ascites was noted.  LYMPH NODES: No lymphadenopathy was appreciated in the neck, axillae or groin.  MUSCULOSKELETAL: Gait was normal. There was no tenderness or effusions noted. Muscle strength and tone were normal. EXTREMITIES: No cyanosis, clubbing or edema.  NEUROLOGIC: Alert and oriented x 3. Normal affect. Gait was normal. Normal deep tendon reflexes with no pathological reflexes. Sensation to touch was normal.  PSYCHIATRIC: good mood, orientated to place, time and person     Labs and Imagings     No results found for any previous visit.       Imaging:     Assessment:     Principle Problem: Type 2 diabetes  mellitus without complication, without long-term current use of insulin [E11.9]   Co-morbidity/Active Problems:   Patient Active Problem List   Diagnosis    Malignant 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    Essential hypertension    Type 2 diabetes mellitus without complication, without long-term current use of insulin    Encounter for antineoplastic chemotherapy    Chemotherapy-induced nausea    Chemotherapy-induced neutropenia        Sherice Haji is a 87 y.o. female with PMH of The primary encounter diagnosis was Type 2 diabetes mellitus without complication, without long-term current use of insulin. Diagnoses of Malignant 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 and Essential hypertension were also pertinent to this visit., with Type 2 diabetes mellitus without complication, without long-term current use of insulin [E11.9]   Sherice Haji is a 87 y.o. 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 ECOG 1  Normal renal function  Neutropenia, mild ANC 1400    Plan:   Overall Plan:  Concurrent chemoradiation therapy, followed by optional cysctemy and full dose chemo C/G    --Labs: CBC CMP and magnesium every week  --Proceed with Rx concurrent chemotherapy cisplatin 20 mg and Taxol 50 mg weekly this week  NO chemo next week( holiday and mild neutropenia)  --Radiation Oncology  the overall plan is to concurrent chemoradiation therapy and bladder preservation.  If there is residual disease after  completion of chemoradiation therapy the option will be cystectomy versus second-line immunotherapy to induce a local response in the bladder.  In easch case, she need full dose chemo C/G  After completion of local therapy.   --return to clinic in next Friday. No chemo next week        Signature    Misael Gordon M.D., Ph.D., [facility]  Hematology-Oncology    Signed 11/21/2019 9:47 AM   Note Ends Here

## 2019-12-05 ENCOUNTER — OFFICE VISIT (OUTPATIENT)
Dept: HEMATOLOGY/ONCOLOGY | Facility: CLINIC | Age: 84
End: 2019-12-05
Payer: COMMERCIAL

## 2019-12-05 VITALS
RESPIRATION RATE: 8 BRPM | HEART RATE: 90 BPM | BODY MASS INDEX: 26.05 KG/M2 | OXYGEN SATURATION: 95 % | TEMPERATURE: 97 F | DIASTOLIC BLOOD PRESSURE: 90 MMHG | HEIGHT: 63 IN | SYSTOLIC BLOOD PRESSURE: 169 MMHG | WEIGHT: 147 LBS

## 2019-12-05 DIAGNOSIS — Z51.11 ENCOUNTER FOR ANTINEOPLASTIC CHEMOTHERAPY: ICD-10-CM

## 2019-12-05 DIAGNOSIS — T45.1X5A CHEMOTHERAPY-INDUCED NEUTROPENIA: ICD-10-CM

## 2019-12-05 DIAGNOSIS — D63.0 ANEMIA IN NEOPLASTIC DISEASE: ICD-10-CM

## 2019-12-05 DIAGNOSIS — C67.3 MALIGNANT NEOPLASM OF ANTERIOR WALL OF URINARY BLADDER: Primary | ICD-10-CM

## 2019-12-05 DIAGNOSIS — D70.1 CHEMOTHERAPY-INDUCED NEUTROPENIA: ICD-10-CM

## 2019-12-05 PROBLEM — D64.9 ABSOLUTE ANEMIA: Status: ACTIVE | Noted: 2019-12-05

## 2019-12-05 PROCEDURE — 1101F PT FALLS ASSESS-DOCD LE1/YR: CPT | Mod: CPTII,S$GLB,, | Performed by: NURSE PRACTITIONER

## 2019-12-05 PROCEDURE — 1101F PR PT FALLS ASSESS DOC 0-1 FALLS W/OUT INJ PAST YR: ICD-10-PCS | Mod: CPTII,S$GLB,, | Performed by: NURSE PRACTITIONER

## 2019-12-05 PROCEDURE — 1159F MED LIST DOCD IN RCRD: CPT | Mod: S$GLB,,, | Performed by: NURSE PRACTITIONER

## 2019-12-05 PROCEDURE — 1126F AMNT PAIN NOTED NONE PRSNT: CPT | Mod: S$GLB,,, | Performed by: NURSE PRACTITIONER

## 2019-12-05 PROCEDURE — 99214 OFFICE O/P EST MOD 30 MIN: CPT | Mod: S$GLB,,, | Performed by: NURSE PRACTITIONER

## 2019-12-05 PROCEDURE — 1126F PR PAIN SEVERITY QUANTIFIED, NO PAIN PRESENT: ICD-10-PCS | Mod: S$GLB,,, | Performed by: NURSE PRACTITIONER

## 2019-12-05 PROCEDURE — 1159F PR MEDICATION LIST DOCUMENTED IN MEDICAL RECORD: ICD-10-PCS | Mod: S$GLB,,, | Performed by: NURSE PRACTITIONER

## 2019-12-05 PROCEDURE — 99214 PR OFFICE/OUTPT VISIT, EST, LEVL IV, 30-39 MIN: ICD-10-PCS | Mod: S$GLB,,, | Performed by: NURSE PRACTITIONER

## 2019-12-05 RX ORDER — FAMOTIDINE 10 MG/ML
20 INJECTION INTRAVENOUS
Status: CANCELLED | OUTPATIENT
Start: 2019-12-05

## 2019-12-05 RX ORDER — SODIUM CHLORIDE 0.9 % (FLUSH) 0.9 %
10 SYRINGE (ML) INJECTION
Status: CANCELLED | OUTPATIENT
Start: 2019-12-05

## 2019-12-05 RX ORDER — HEPARIN 100 UNIT/ML
500 SYRINGE INTRAVENOUS
Status: CANCELLED | OUTPATIENT
Start: 2019-12-05

## 2019-12-05 RX ORDER — DIPHENOXYLATE HYDROCHLORIDE AND ATROPINE SULFATE 2.5; .025 MG/1; MG/1
TABLET ORAL
COMMUNITY
Start: 2019-12-04 | End: 2022-04-05

## 2019-12-05 RX ORDER — EPINEPHRINE 0.3 MG/.3ML
0.3 INJECTION SUBCUTANEOUS ONCE AS NEEDED
Status: CANCELLED | OUTPATIENT
Start: 2019-12-05

## 2019-12-05 RX ORDER — DIPHENHYDRAMINE HYDROCHLORIDE 50 MG/ML
50 INJECTION INTRAMUSCULAR; INTRAVENOUS ONCE AS NEEDED
Status: CANCELLED | OUTPATIENT
Start: 2019-12-05

## 2019-12-05 NOTE — PROGRESS NOTES
Hematology Oncology Progress Note    Hematology Oncology  [facility]     PATIENT: Sherice Haji  : 1932 87 y.o. female  MRN [id number]     PCP: Sejal Castilol MD  Consult Requested By:  No att. providers found    Date of Service: 2019    Subjective:   Interim History:  Malignant neoplasm of anterior wall of urinary blaadder    Sherice Haji is here for to followup for bladder cancer.  Current therapy patient doing well without new complaints she specifically denies hematuria or weight loss she has a good appetite.  This is a 87 y.o. female patient with a history of The primary encounter diagnosis was Malignant 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. Diagnoses of Essential hypertension and Type 2 diabetes mellitus without complication, without long-term current use of insulin were also pertinent to this visit., who is referred here for evaluation treatment of bladder cancer.  The patient has been feeling weak and for T for about 2 months.  Urinalysis shows hematuria or and she was referred to Urology.  CT scan with IV contrast demonstrates a bladder mass. Biopsy shows muscle invasive bladder cancer.     The patient lost about 10 lb over the past 2 months.  She denies any abdominal pain chest pain short of breath.        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    2019 Initial Diagnosis     Malignant 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      10/21/2019 Cancer Staged     Staging form: Urinary Bladder, AJCC 8th Edition  - Clinical: Stage II (cT2, cN0, cM0)      10/31/2019 -  Chemotherapy     Treatment Summary   Plan Name: cisplatin taxol weekly  Treatment Goal: Curative  Status: Active  Start Date: 10/31/2019  End Date: 2019 (Planned)  Provider: Hoda Josue NP  Chemotherapy: CISplatin (PLATINOL) 20 mg/m2 = 34 mg in sodium chloride 0.9% 500 mL chemo infusion, 20 mg/m2 = 34 mg (100 % of original dose 20 mg/m2),  Intravenous, Clinic/HOD 1 time, 3 of 6 cycles  Dose modification: 20 mg/m2 (original dose 20 mg/m2, Cycle 1)  PACLitaxel (TAXOL) 50 mg/m2 = 84 mg in sodium chloride 0.9% 500 mL chemo infusion, 50 mg/m2 = 84 mg (100 % of original dose 50 mg/m2), Intravenous, Clinic/HOD 1 time, 3 of 6 cycles  Dose modification: 50 mg/m2 (original dose 50 mg/m2, Cycle 1)         Oncologic History:      Oncologic History     Oncologic Treatment     Pathology      Past Medical History:   Past Medical History:   Diagnosis Date    Allergy     Anxiety     Arthritis     Bladder cancer     Cataract     Removed in May 2019    Depression     Diabetes mellitus     Hypertension     Malignant 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 9/25/2019    Type 2 diabetes mellitus without complication, without long-term current use of insulin 9/25/2019       Past Surgical HIstory:   Past Surgical History:   Procedure Laterality Date    BLADDER TUMOR EXCISION  09/2019    CATARACT EXTRACTION, BILATERAL  05/2019       Allergies:  Review of patient's allergies indicates:   Allergen Reactions    Aspirin        Medications:  Current Outpatient Medications   Medication Sig Dispense Refill    diphenoxylate-atropine 2.5-0.025 mg (LOMOTIL) 2.5-0.025 mg per tablet       glimepiride (AMARYL) 2 MG tablet       HYDROcodone-acetaminophen (NORCO) 5-325 mg per tablet       indapamide (LOZOL) 2.5 MG Tab       levothyroxine (SYNTHROID) 50 MCG tablet       loratadine (CLARITIN) 10 mg tablet       losartan (COZAAR) 25 MG tablet       meloxicam (MOBIC) 7.5 MG tablet       metFORMIN (GLUCOPHAGE-XR) 500 MG 24 hr tablet       metoprolol tartrate (LOPRESSOR) 50 MG tablet       ondansetron (ZOFRAN) 8 MG tablet Take 1 tablet (8 mg total) by mouth every 12 (twelve) hours as needed for Nausea. 30 tablet 2    simvastatin (ZOCOR) 20 MG tablet        No current facility-administered medications for this visit.        Family History:   Family History   Problem  "Relation Age of Onset    No Known Problems Mother     No Known Problems Father     Breast cancer Sister     Prostate cancer Brother     No Known Problems Maternal Aunt     No Known Problems Maternal Uncle     No Known Problems Paternal Aunt     No Known Problems Paternal Uncle     No Known Problems Maternal Grandmother     No Known Problems Maternal Grandfather     No Known Problems Paternal Grandmother     No Known Problems Paternal Grandfather     Breast cancer Sister     Prostate cancer Brother        Social History:  reports that she has never smoked. She has never used smokeless tobacco. She reports that she drank alcohol. She reports that she does not use drugs.    Review of Systemas  Constitutional: No change in weight, appetie, fatigue, fever, or night sweats  Eyes: No changes in vision  Ears, Nose, Mouth, Throat, and Face: No hearing problems, ear pain, rummy nose, or sore throat  Respiratory: No shortness of breath or cough  Cardiovascular: No chest pain, palpitations or dizziness  Gastrointestinal: No abdominal pain, hematochezia, melena  Genitourinary: No dysuria, hematuria, nocturia, menstrual problems, post menopausal  Integumentary/Breast: No rashes or itching  Hematologic/Lymphatic: No anemia or bleeding abnormalities  Musculoskeletal: No joint or muscle abnormalities  Neurological: No sensory or motor problems, no headaches  Behavioral/Psych: No depression, anxiety, cognitive problems, or stress  Endocrine: No diabetes or thyroid problems  Allergic/Immunologic: See allergy above    Physical Exam      Vitals:   Vitals:    12/05/19 0906   BP: (!) 169/90   BP Location: Right arm   Patient Position: Sitting   BP Method: Large (Automatic)   Pulse: 90   Resp: (!) 8   Temp: 97.1 °F (36.2 °C)   TempSrc: Temporal   SpO2: 95%   Weight: 66.7 kg (147 lb)   Height: 5' 3" (1.6 m)     BMI: Body mass index is 26.04 kg/m².  BSA Body surface area is 1.72 meters squared.  ECOG Performance Status:   ECOG " SCORE        ECOG 1    GENERAL APPEARANCE: Well developed, well nourished, in no acute distress.  SKIN: Inspection of the skin reveals no rashes, ulcerations or petechiae.  HEENT: The sclerae were anicteric and conjunctivae were pink and moist. Extraocular movements were intact and pupils were equal, round, and reactive to light with normal accommodation. External inspection of the ears and nose showed no scars, lesions, or masses. Lips, teeth, and gums showed normal mucosa. The oral mucosa, hard and soft palate, tongue and posterior pharynx were normal.  NECK: Supple and symmetric. There was no thyroid enlargement, and no tenderness, or masses were felt.  CRESPIRATORY: Normal AP diameter and normal contour without any kyphoscoliosis. LUNGS: Auscultation of the lungs revealed normal breath sounds without any other adventitious sounds or rubs.  CARDIOVASCULAR: There was a regular rate and rhythm without any murmurs, gallops, rubs. The carotid pulses were normal and 2+ bilaterally without bruits. Peripheral pulses were 2+ and symmetric.  GASTROINTESTINAL: Soft and nontender with normal bowel sounds. The liver span was approximately 5-6 cm in the right midclavicular line by percussion. The liver edge was nontender. The spleen was not palpable. There were no inguinal or umbilical hernias noted. No ascites was noted.  LYMPH NODES: No lymphadenopathy was appreciated in the neck, axillae or groin.  MUSCULOSKELETAL: Gait was normal. There was no tenderness or effusions noted. Muscle strength and tone were normal. EXTREMITIES: No cyanosis, clubbing or edema.  NEUROLOGIC: Alert and oriented x 3. Normal affect. Gait was normal. Normal deep tendon reflexes with no pathological reflexes. Sensation to touch was normal.  PSYCHIATRIC: good mood, orientated to place, time and person     Labs and Imagings     No results found for any previous visit.       Imaging:     Assessment:     Principle Problem: No primary diagnosis found.    Co-morbidity/Active Problems:   Patient Active Problem List   Diagnosis    Malignant 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    Essential hypertension    Type 2 diabetes mellitus without complication, without long-term current use of insulin    Encounter for antineoplastic chemotherapy    Chemotherapy-induced nausea    Chemotherapy-induced neutropenia        Sherice Haji is a 87 y.o. female with PMH of There were no encounter diagnoses., with No primary diagnosis found.   Sherice Haji is a 87 y.o. 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 ECOG 1  Normal renal function  Neutropenia, mild ANC 1400    Plan:   Overall Plan:  Concurrent chemoradiation therapy, followed by optional cysctemy and full dose chemo C/G    --Labs: CBC CMP and magnesium every week  --Proceed with Rx concurrent chemotherapy cisplatin 20 mg and Taxol 50 mg weekly this week  NO chemo next week( holiday and mild neutropenia)  --Radiation Oncology  the overall plan is to concurrent chemoradiation therapy and bladder preservation.  If there is residual disease after completion of chemoradiation therapy the option will be cystectomy versus second-line immunotherapy to induce a local response in the bladder.  In easch case, she need full dose chemo C/G  After completion of local therapy.   --return to clinic in next Friday. No chemo next week        Signature    Misael Gordon M.D., Ph.D., [facility]  Hematology-Oncology    Signed  12/5/2019 9:47 AM   Note Ends Here

## 2019-12-05 NOTE — PROGRESS NOTES
Subjective:      Patient ID: Sherice Haji is a 87 y.o. female.    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    9/25/2019 Initial Diagnosis     Malignant 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      10/21/2019 Cancer Staged     Staging form: Urinary Bladder, AJCC 8th Edition  - Clinical: Stage II (cT2, cN0, cM0)      10/31/2019 -  Chemotherapy     Treatment Summary   Plan Name: cisplatin taxol weekly  Treatment Goal: Curative  Status: Active  Start Date: 10/31/2019  End Date: 12/12/2019 (Planned)  Provider: Hoda Josue NP  Chemotherapy: CISplatin (PLATINOL) 20 mg/m2 = 34 mg in sodium chloride 0.9% 500 mL chemo infusion, 20 mg/m2 = 34 mg (100 % of original dose 20 mg/m2), Intravenous, Clinic/HOD 1 time, 3 of 6 cycles  Dose modification: 20 mg/m2 (original dose 20 mg/m2, Cycle 1)  PACLitaxel (TAXOL) 50 mg/m2 = 84 mg in sodium chloride 0.9% 500 mL chemo infusion, 50 mg/m2 = 84 mg (100 % of original dose 50 mg/m2), Intravenous, Clinic/HOD 1 time, 3 of 6 cycles  Dose modification: 50 mg/m2 (original dose 50 mg/m2, Cycle 1)           Chief Complaint: Malignant neoplasm of anterior wall of urinary blaadder    Sherice Haji is here for cisplatin taxol weekly      Treatment Goal:   Curative      Status:   Active      Start Date:   10/31/2019      End Date:   12/12/2019 (Planned)      Provider:   Hoda Josue NP      Chemotherapy:   CISplatin (PLATINOL) 20 mg/m2 = 34 mg in sodium chloride 0.9% 500 mL chemo infusion, 20 mg/m2 = 34 mg (100 % of original dose 20 mg/m2), Intravenous, Clinic/HOD 1 time, 3 of 6 cycles    Dose modification: 20 mg/m2 (original dose 20 mg/m2, Cycle 1)        PACLitaxel (TAXOL) 50 mg/m2 = 84 mg in sodium chloride 0.9% 500 mL chemo infusion, 50 mg/m2 = 84 mg (100 % of original dose 50 mg/m2), Intravenous, Clinic/HOD 1 time, 3 of 6 cycles    Dose modification: 50 mg/m2 (original dose 50 mg/m2, Cycle 1)  Sherice Haji is here for to followup  for bladder cancer.  Current therapy patient doing well without new complaints she specifically denies hematuria or weight loss she has a good appetite.  This is a 87 y.o. female patient with a history of The primary encounter diagnosis was Malignant 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. Diagnoses of Essential hypertension and Type 2 diabetes mellitus without complication, without long-term current use of insulin were also pertinent to this visit., who is referred here for evaluation treatment of bladder cancer.  The patient has been feeling weak and for Tired for about 2 months.  Urinalysis shows hematuria or and she was referred to Urology.  CT scan with IV contrast demonstrates a bladder mass. Biopsy shows muscle invasive bladder cancer.     The patient lost about 10 lb over the past 2 months.  She denies any abdominal pain chest pain short of breath.     10/30/19 visit:  Today she is in clinic to discuss starting cycle 1 of weekly taxol with cisplatin. She states she began XRT today with Dr Brooks and has no c/o. Feeling good, ambu with cane and has very active lifestyle for her age. She is eager to begin tx but does not want it to negatively impact her ADLs. She does not have a PICC or [place] will set up PICC for next week prior to cycle 2.     11/6/19 visit;  Today she is in clinic after cycle 1 of weekly taxol/cis. She states she tolerated well but did have some mild nausea. She received her PICC line and infusion will do weekly dressing changes. Labs reviewed WBC 2.6  ANC 1.2 will proceed with chemo as planned for tomorrow but will closely monitor neutropenia.     11/14/19 visit:  Today she is here for consideration of cycle 3 of 6 cis/taxol with weekly XRt. She is tolerating tx very well with mid nausea noted but controlled with meds. She states her am FGLU is >200 recently, explained likely due to pre-med steroids. Will help facilitate appt with PCP for BS control. Otherwise, pt is cleared for tx  today and will RTC in 1 week with labs. She is to continue XRT with Dr Brooks.      19 visit:  Today she is here for consideration of cycle 5 of 6 combined chemo with XRT. She skipped last week due to holiday. States she is having fatigue but otherwise glenn tx well. Labs reviewd and mild anemia noted, pt declines transfusion. She is to RTc in 1 week for cycle 6 of 6 and further planning woth Dr Gordon.     Imagin19: CT A/P:      Past Medical History:   Diagnosis Date    Allergy     Anxiety     Arthritis     Bladder cancer     Cataract     Removed in May 2019    Depression     Diabetes mellitus     Hypertension     Malignant 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 2019    Type 2 diabetes mellitus without complication, without long-term current use of insulin 2019     Family History   Problem Relation Age of Onset    No Known Problems Mother     No Known Problems Father     Breast cancer Sister     Prostate cancer Brother     No Known Problems Maternal Aunt     No Known Problems Maternal Uncle     No Known Problems Paternal Aunt     No Known Problems Paternal Uncle     No Known Problems Maternal Grandmother     No Known Problems Maternal Grandfather     No Known Problems Paternal Grandmother     No Known Problems Paternal Grandfather     Breast cancer Sister     Prostate cancer Brother      Social History     Socioeconomic History    Marital status:      Spouse name: Not on file    Number of children: Not on file    Years of education: Not on file    Highest education level: Not on file   Occupational History    Not on file   Social Needs    Financial resource strain: Not on file    Food insecurity:     Worry: Not on file     Inability: Not on file    Transportation needs:     Medical: Not on file     Non-medical: Not on file   Tobacco Use    Smoking status: Never Smoker    Smokeless tobacco: Never Used   Substance and Sexual Activity    Alcohol use:  Not Currently    Drug use: Never    Sexual activity: Not Currently   Lifestyle    Physical activity:     Days per week: Not on file     Minutes per session: Not on file    Stress: Not on file   Relationships    Social connections:     Talks on phone: Not on file     Gets together: Not on file     Attends Orthodoxy service: Not on file     Active member of club or organization: Not on file     Attends meetings of clubs or organizations: Not on file     Relationship status: Not on file   Other Topics Concern    Not on file   Social History Narrative    Not on file     Past Surgical History:   Procedure Laterality Date    BLADDER TUMOR EXCISION  09/2019    CATARACT EXTRACTION, BILATERAL  05/2019           Review of systems:  Review of Systems   Constitutional: Positive for activity change, appetite change and unexpected weight change. Negative for chills, diaphoresis, fatigue and fever.   HENT: Negative for congestion, dental problem, mouth sores, nosebleeds, sore throat, tinnitus and voice change.    Eyes: Negative for photophobia, discharge and visual disturbance.   Respiratory: Negative for apnea, cough, choking, chest tightness, shortness of breath, wheezing and stridor.    Cardiovascular: Negative for chest pain, palpitations and leg swelling.   Gastrointestinal: Negative for abdominal distention, abdominal pain, anal bleeding, blood in stool, constipation, diarrhea, nausea, rectal pain and vomiting.   Endocrine: Negative for cold intolerance and heat intolerance.   Genitourinary: Positive for hematuria. Negative for difficulty urinating, dysuria, menstrual problem, vaginal bleeding, vaginal discharge and vaginal pain.   Musculoskeletal: Negative for arthralgias, back pain, gait problem, joint swelling and myalgias.   Skin: Negative for color change, pallor, rash and wound.   Neurological: Negative for dizziness, syncope, light-headedness and numbness.   Hematological: Negative for adenopathy. Does not  bruise/bleed easily.   Psychiatric/Behavioral: Negative for agitation, confusion, sleep disturbance and suicidal ideas. The patient is not nervous/anxious.        Objective:     Physical Exam   Constitutional: She is oriented to person, place, and time. She appears well-developed and well-nourished.   HENT:   Head: Normocephalic.   Eyes: Pupils are equal, round, and reactive to light. Conjunctivae and EOM are normal.   Neck: Normal range of motion. Neck supple.   Cardiovascular: Normal rate, regular rhythm, normal heart sounds and intact distal pulses.   Pulmonary/Chest: Effort normal and breath sounds normal. She exhibits no mass, no tenderness and no deformity. No breast swelling, tenderness or discharge. Breasts are symmetrical.   Abdominal: Soft. Bowel sounds are normal.   Musculoskeletal: Normal range of motion.   Neurological: She is alert and oriented to person, place, and time.   Skin: Skin is warm and dry.   Psychiatric: She has a normal mood and affect. Her behavior is normal. Judgment and thought content normal.     Vitals:    12/05/19 0906   BP: (!) 169/90   Pulse: 90   Resp: (!) 8   Temp: 97.1 °F (36.2 °C)   Body surface area is 1.72 meters squared.  Wt Readings from Last 3 Encounters:   12/05/19 66.7 kg (147 lb)   11/21/19 67.3 kg (148 lb 4.8 oz)   11/14/19 67.7 kg (149 lb 3.2 oz)     Temp Readings from Last 3 Encounters:   12/05/19 97.1 °F (36.2 °C) (Temporal)   11/21/19 98.6 °F (37 °C) (Temporal)   11/14/19 96.7 °F (35.9 °C) (Temporal)     BP Readings from Last 3 Encounters:   12/05/19 (!) 169/90   11/21/19 (!) 140/70   11/14/19 (!) 160/70     Pulse Readings from Last 3 Encounters:   12/05/19 90   11/21/19 87   11/14/19 91     Labs:  11/5/19  WBC 2.5 ANC 1.2 Hgb 11.6  Na 132, K 4.6 mG 2.1 LFT WNL Cr 0.90    11/12/19 WBC 2.4 HGB 10.8 Hct 32.1  ANC 1.8 Na 132 Na 4.1 Cr 0.979 mg 1.7 LFT WNL FGLU 262    12/3/19 WBC 2.7 ANC 1.6 Hgb 9.7 HCT 29.7  Na 133 K 4.4 Cr 0.88 Mg/Phos WNL      Assessment:      1. Malignant 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    2. Encounter for antineoplastic chemotherapy    3. Chemotherapy-induced neutropenia           Plan:   There are no diagnoses linked to this encounter.  1. Today is she cleared cor cycle 5 of 6 weekly taxol/cisplatin Mild nausea report but controlled with meds.  2.She is to continue XRT with Dr Brooks, in which she has 8 days remaining.   3. the overall plan is to concurrent chemoradiation therapy and bladder preservation.  If there is residual disease after completion of chemoradiation therapy the option will be cystectomy versus second-line immunotherapy to induce a local response in the bladder.  In each case, she need full dose chemo C/G  After completion of local therapy.   4. Anemia noted, pt asymptomatic. But could possible need a transfusion next week if Hgb continues to drop.   5. RTC1 week to see dr Gordon for cycle 6 of 6 chemo with XRT. With labs    Hoda Josue, ANP

## 2019-12-12 ENCOUNTER — OFFICE VISIT (OUTPATIENT)
Dept: HEMATOLOGY/ONCOLOGY | Facility: CLINIC | Age: 84
End: 2019-12-12
Payer: COMMERCIAL

## 2019-12-12 VITALS
TEMPERATURE: 97 F | WEIGHT: 148.81 LBS | HEART RATE: 89 BPM | OXYGEN SATURATION: 95 % | BODY MASS INDEX: 26.37 KG/M2 | HEIGHT: 63 IN | RESPIRATION RATE: 10 BRPM | SYSTOLIC BLOOD PRESSURE: 178 MMHG | DIASTOLIC BLOOD PRESSURE: 76 MMHG

## 2019-12-12 DIAGNOSIS — R11.0 CHEMOTHERAPY-INDUCED NAUSEA: ICD-10-CM

## 2019-12-12 DIAGNOSIS — Z51.11 ENCOUNTER FOR ANTINEOPLASTIC CHEMOTHERAPY: ICD-10-CM

## 2019-12-12 DIAGNOSIS — I10 ESSENTIAL HYPERTENSION: ICD-10-CM

## 2019-12-12 DIAGNOSIS — D63.0 ANEMIA IN NEOPLASTIC DISEASE: ICD-10-CM

## 2019-12-12 DIAGNOSIS — E11.9 TYPE 2 DIABETES MELLITUS WITHOUT COMPLICATION, WITHOUT LONG-TERM CURRENT USE OF INSULIN: ICD-10-CM

## 2019-12-12 DIAGNOSIS — T45.1X5A CHEMOTHERAPY-INDUCED NAUSEA: ICD-10-CM

## 2019-12-12 DIAGNOSIS — C67.3 MALIGNANT NEOPLASM OF ANTERIOR WALL OF URINARY BLADDER: Primary | ICD-10-CM

## 2019-12-12 PROCEDURE — 99215 OFFICE O/P EST HI 40 MIN: CPT | Mod: S$GLB,,, | Performed by: INTERNAL MEDICINE

## 2019-12-12 PROCEDURE — 1126F PR PAIN SEVERITY QUANTIFIED, NO PAIN PRESENT: ICD-10-PCS | Mod: S$GLB,,, | Performed by: INTERNAL MEDICINE

## 2019-12-12 PROCEDURE — 1159F PR MEDICATION LIST DOCUMENTED IN MEDICAL RECORD: ICD-10-PCS | Mod: S$GLB,,, | Performed by: INTERNAL MEDICINE

## 2019-12-12 PROCEDURE — 99215 PR OFFICE/OUTPT VISIT, EST, LEVL V, 40-54 MIN: ICD-10-PCS | Mod: S$GLB,,, | Performed by: INTERNAL MEDICINE

## 2019-12-12 PROCEDURE — 1126F AMNT PAIN NOTED NONE PRSNT: CPT | Mod: S$GLB,,, | Performed by: INTERNAL MEDICINE

## 2019-12-12 PROCEDURE — 1101F PR PT FALLS ASSESS DOC 0-1 FALLS W/OUT INJ PAST YR: ICD-10-PCS | Mod: CPTII,S$GLB,, | Performed by: INTERNAL MEDICINE

## 2019-12-12 PROCEDURE — 1101F PT FALLS ASSESS-DOCD LE1/YR: CPT | Mod: CPTII,S$GLB,, | Performed by: INTERNAL MEDICINE

## 2019-12-12 PROCEDURE — 1159F MED LIST DOCD IN RCRD: CPT | Mod: S$GLB,,, | Performed by: INTERNAL MEDICINE

## 2019-12-12 NOTE — PROGRESS NOTES
Hematology Oncology Progress Note    Hematology Oncology  [facility]     PATIENT: Sherice Haji  : 1932 87 y.o. female  MRN [id number]     PCP: Sejal Castillo MD  Consult Requested By:  No att. providers found    Date of Service: 2019    Subjective:   Interim History:  Malignant Neoplasm of anterior wall of urinary bladder    The patient here for follow-up on chemotherapy radiation therapy.  The patient doing well without any complications except and leukopenia.  She has 3 more days of radiation to go.    Sherice Haji is here for to followup for bladder cancer.  Current therapy patient doing well without new complaints she specifically denies hematuria or weight loss she has a good appetite.  This is a 87 y.o. female patient with a history of The primary encounter diagnosis was Malignant 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. Diagnoses of Essential hypertension and Type 2 diabetes mellitus without complication, without long-term current use of insulin were also pertinent to this visit., who is referred here for evaluation treatment of bladder cancer.  The patient has been feeling weak and for T for about 2 months.  Urinalysis shows hematuria or and she was referred to Urology.  CT scan with IV contrast demonstrates a bladder mass. Biopsy shows muscle invasive bladder cancer.     The patient lost about 10 lb over the past 2 months.  She denies any abdominal pain chest pain short of breath.        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    2019 Initial Diagnosis     Malignant 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      10/21/2019 Cancer Staged     Staging form: Urinary Bladder, AJCC 8th Edition  - Clinical: Stage II (cT2, cN0, cM0)      10/31/2019 -  Chemotherapy     Treatment Summary   Plan Name: cisplatin taxol weekly  Treatment Goal: Curative  Status: Active  Start Date: 10/31/2019  End Date: 2019  (Planned)  Provider: Hoda Josue NP  Chemotherapy: CISplatin (PLATINOL) 20 mg/m2 = 34 mg in sodium chloride 0.9% 500 mL chemo infusion, 20 mg/m2 = 34 mg (100 % of original dose 20 mg/m2), Intravenous, Clinic/HOD 1 time, 4 of 6 cycles  Dose modification: 20 mg/m2 (original dose 20 mg/m2, Cycle 1)  PACLitaxel (TAXOL) 50 mg/m2 = 84 mg in sodium chloride 0.9% 500 mL chemo infusion, 50 mg/m2 = 84 mg (100 % of original dose 50 mg/m2), Intravenous, Clinic/HOD 1 time, 4 of 6 cycles  Dose modification: 50 mg/m2 (original dose 50 mg/m2, Cycle 1)         Oncologic History:      Oncologic History     Oncologic Treatment     Pathology      Past Medical History:   Past Medical History:   Diagnosis Date    Absolute anemia 12/5/2019    Allergy     Anxiety     Arthritis     Bladder cancer     Cataract     Removed in May 2019    Depression     Diabetes mellitus     Hypertension     Malignant 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 9/25/2019    Type 2 diabetes mellitus without complication, without long-term current use of insulin 9/25/2019       Past Surgical HIstory:   Past Surgical History:   Procedure Laterality Date    BLADDER TUMOR EXCISION  09/2019    CATARACT EXTRACTION, BILATERAL  05/2019       Allergies:  Review of patient's allergies indicates:   Allergen Reactions    Aspirin        Medications:  Current Outpatient Medications   Medication Sig Dispense Refill    diphenoxylate-atropine 2.5-0.025 mg (LOMOTIL) 2.5-0.025 mg per tablet       glimepiride (AMARYL) 2 MG tablet       HYDROcodone-acetaminophen (NORCO) 5-325 mg per tablet       indapamide (LOZOL) 2.5 MG Tab       levothyroxine (SYNTHROID) 50 MCG tablet       loratadine (CLARITIN) 10 mg tablet       losartan (COZAAR) 25 MG tablet       meloxicam (MOBIC) 7.5 MG tablet       metFORMIN (GLUCOPHAGE-XR) 500 MG 24 hr tablet       metoprolol tartrate (LOPRESSOR) 50 MG tablet       ondansetron (ZOFRAN) 8 MG tablet Take 1 tablet (8 mg total) by  mouth every 12 (twelve) hours as needed for Nausea. 30 tablet 2    simvastatin (ZOCOR) 20 MG tablet        No current facility-administered medications for this visit.        Family History:   Family History   Problem Relation Age of Onset    No Known Problems Mother     No Known Problems Father     Breast cancer Sister     Prostate cancer Brother     No Known Problems Maternal Aunt     No Known Problems Maternal Uncle     No Known Problems Paternal Aunt     No Known Problems Paternal Uncle     No Known Problems Maternal Grandmother     No Known Problems Maternal Grandfather     No Known Problems Paternal Grandmother     No Known Problems Paternal Grandfather     Breast cancer Sister     Prostate cancer Brother        Social History:  reports that she has never smoked. She has never used smokeless tobacco. She reports that she drank alcohol. She reports that she does not use drugs.    Review of Systemas  Constitutional: No change in weight, appetie, fatigue, fever, or night sweats  Eyes: No changes in vision  Ears, Nose, Mouth, Throat, and Face: No hearing problems, ear pain, rummy nose, or sore throat  Respiratory: No shortness of breath or cough  Cardiovascular: No chest pain, palpitations or dizziness  Gastrointestinal: No abdominal pain, hematochezia, melena  Genitourinary: No dysuria, hematuria, nocturia, menstrual problems, post menopausal  Integumentary/Breast: No rashes or itching  Hematologic/Lymphatic: No anemia or bleeding abnormalities  Musculoskeletal: No joint or muscle abnormalities  Neurological: No sensory or motor problems, no headaches  Behavioral/Psych: No depression, anxiety, cognitive problems, or stress  Endocrine: No diabetes or thyroid problems  Allergic/Immunologic: See allergy above    Physical Exam      Vitals:   Vitals:    12/12/19 0854   BP: (!) 178/76   BP Location: Right arm   Patient Position: Sitting   BP Method: Large (Automatic)   Pulse: 89   Resp: 10   Temp: 96.8  "°F (36 °C)   TempSrc: Temporal   SpO2: 95%   Weight: 67.5 kg (148 lb 12.8 oz)   Height: 5' 3" (1.6 m)     BMI: Body mass index is 26.36 kg/m².  BSA Body surface area is 1.73 meters squared.  ECOG Performance Status:   ECOG SCORE    1 - Restricted in strenuous activity-ambulatory and able to carry out work of a light nature      ECOG 1    GENERAL APPEARANCE: Well developed, well nourished, in no acute distress.  SKIN: Inspection of the skin reveals no rashes, ulcerations or petechiae.  HEENT: The sclerae were anicteric and conjunctivae were pink and moist. Extraocular movements were intact and pupils were equal, round, and reactive to light with normal accommodation. External inspection of the ears and nose showed no scars, lesions, or masses. Lips, teeth, and gums showed normal mucosa. The oral mucosa, hard and soft palate, tongue and posterior pharynx were normal.  NECK: Supple and symmetric. There was no thyroid enlargement, and no tenderness, or masses were felt.  CRESPIRATORY: Normal AP diameter and normal contour without any kyphoscoliosis. LUNGS: Auscultation of the lungs revealed normal breath sounds without any other adventitious sounds or rubs.  CARDIOVASCULAR: There was a regular rate and rhythm without any murmurs, gallops, rubs. The carotid pulses were normal and 2+ bilaterally without bruits. Peripheral pulses were 2+ and symmetric.  GASTROINTESTINAL: Soft and nontender with normal bowel sounds. The liver span was approximately 5-6 cm in the right midclavicular line by percussion. The liver edge was nontender. The spleen was not palpable. There were no inguinal or umbilical hernias noted. No ascites was noted.  LYMPH NODES: No lymphadenopathy was appreciated in the neck, axillae or groin.  MUSCULOSKELETAL: Gait was normal. There was no tenderness or effusions noted. Muscle strength and tone were normal. EXTREMITIES: No cyanosis, clubbing or edema.  NEUROLOGIC: Alert and oriented x 3. Normal affect. Gait " was normal. Normal deep tendon reflexes with no pathological reflexes. Sensation to touch was normal.  PSYCHIATRIC: good mood, orientated to place, time and person     Labs and Imagings     No results found for any previous visit.     All cell laboratory reviewed which shows white cell 1.7 and sodium 126  Imaging:     Assessment:     Principle Problem: Malignant 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 [C67.3]   Co-morbidity/Active Problems:   Patient Active Problem List   Diagnosis    Malignant 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    Essential hypertension    Type 2 diabetes mellitus without complication, without long-term current use of insulin    Encounter for antineoplastic chemotherapy    Chemotherapy-induced nausea    Chemotherapy-induced neutropenia    Absolute anemia        Sherice Haji is a 87 y.o. 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 Diagnoses of Encounter for antineoplastic chemotherapy, Type 2 diabetes mellitus without complication, without long-term current use of insulin, Anemia in neoplastic disease, Chemotherapy-induced nausea, and Essential hypertension were also pertinent to this visit., with Malignant 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 [C67.3]   Sherice Haji is a 87 y.o. 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 [C67.3]   87-year-old female otherwise healthy except hypertension diabetes present with bladder mass biopsy-proven muscle invasive bladder cancer( stage II) and     biopsy-proven muscle invasive bladder cancer( stage II)   Normal renal function  Neutropenia  On chemo radiation  therapy    Plan:   Overall Plan:  Concurrent chemoradiation therapy, followed by optional cysctemy and full dose chemo C/G    --Labs: CBC CMP  4 weeks  --complete chemotherapy; no more chemotherapy   --the overall plan is to concurrent chemoradiation therapy and bladder preservation.  If there is residual disease after completion of chemoradiation therapy the option will be cystectomy versus second-line immunotherapy to induce a local response in the bladder.  In easch case, she need full dose chemo C/G  After completion of local therapy.   --13 clinic in 6 weeks  --refer patient back to Urology and consider repeat cystoscope to evaluate response    Adalid Valera MD  [facility] [place] [id number] [phone]    Signature    Misael Gordon M.D., Ph.D., [facility]  Hematology-Oncology    Signed 12/12/2019 9:47 AM   Note Ends Here

## 2020-01-20 ENCOUNTER — TELEPHONE (OUTPATIENT)
Dept: UROLOGY | Facility: CLINIC | Age: 85
End: 2020-01-20

## 2020-01-23 ENCOUNTER — OFFICE VISIT (OUTPATIENT)
Dept: HEMATOLOGY/ONCOLOGY | Facility: CLINIC | Age: 85
End: 2020-01-23
Payer: COMMERCIAL

## 2020-01-23 VITALS
OXYGEN SATURATION: 98 % | BODY MASS INDEX: 27.07 KG/M2 | HEIGHT: 63 IN | WEIGHT: 152.81 LBS | HEART RATE: 84 BPM | SYSTOLIC BLOOD PRESSURE: 142 MMHG | RESPIRATION RATE: 20 BRPM | TEMPERATURE: 97 F | DIASTOLIC BLOOD PRESSURE: 80 MMHG

## 2020-01-23 DIAGNOSIS — E11.9 TYPE 2 DIABETES MELLITUS WITHOUT COMPLICATION, WITHOUT LONG-TERM CURRENT USE OF INSULIN: ICD-10-CM

## 2020-01-23 DIAGNOSIS — C67.3 MALIGNANT NEOPLASM OF ANTERIOR WALL OF URINARY BLADDER: Primary | ICD-10-CM

## 2020-01-23 PROCEDURE — 1126F PR PAIN SEVERITY QUANTIFIED, NO PAIN PRESENT: ICD-10-PCS | Mod: S$GLB,,, | Performed by: INTERNAL MEDICINE

## 2020-01-23 PROCEDURE — 1159F PR MEDICATION LIST DOCUMENTED IN MEDICAL RECORD: ICD-10-PCS | Mod: S$GLB,,, | Performed by: INTERNAL MEDICINE

## 2020-01-23 PROCEDURE — 1159F MED LIST DOCD IN RCRD: CPT | Mod: S$GLB,,, | Performed by: INTERNAL MEDICINE

## 2020-01-23 PROCEDURE — 1101F PT FALLS ASSESS-DOCD LE1/YR: CPT | Mod: CPTII,S$GLB,, | Performed by: INTERNAL MEDICINE

## 2020-01-23 PROCEDURE — 1126F AMNT PAIN NOTED NONE PRSNT: CPT | Mod: S$GLB,,, | Performed by: INTERNAL MEDICINE

## 2020-01-23 PROCEDURE — 1101F PR PT FALLS ASSESS DOC 0-1 FALLS W/OUT INJ PAST YR: ICD-10-PCS | Mod: CPTII,S$GLB,, | Performed by: INTERNAL MEDICINE

## 2020-01-23 PROCEDURE — 99214 OFFICE O/P EST MOD 30 MIN: CPT | Mod: S$GLB,,, | Performed by: INTERNAL MEDICINE

## 2020-01-23 PROCEDURE — 99214 PR OFFICE/OUTPT VISIT, EST, LEVL IV, 30-39 MIN: ICD-10-PCS | Mod: S$GLB,,, | Performed by: INTERNAL MEDICINE

## 2020-01-23 RX ORDER — INSULIN GLARGINE 100 [IU]/ML
INJECTION, SOLUTION SUBCUTANEOUS
COMMUNITY
Start: 2020-01-23

## 2020-01-23 RX ORDER — TRAZODONE HYDROCHLORIDE 50 MG/1
TABLET ORAL
COMMUNITY
Start: 2020-01-22

## 2020-01-23 NOTE — PROGRESS NOTES
Hematology Oncology Progress Note    Hematology Oncology  [facility]     PATIENT: Sherice Haji  : 1932 87 y.o. female  MRN [id number]     PCP: Sejal Castillo MD  Consult Requested By:  No att. providers found    Date of Service: 2020    Subjective:   Interim History:  Follow-up    The patient here for follow-up on chemotherapy radiation therapy.  The patient doing well the patient is chemotherapy about 3 weeks ago without complications.      Oncology History:      Sherice Haji is here for to followup for bladder cancer.  Current therapy patient doing well without new complaints she specifically denies hematuria or weight loss she has a good appetite.  This is a 87 y.o. female patient with a history of The primary encounter diagnosis was Malignant 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. Diagnoses of Essential hypertension and Type 2 diabetes mellitus without complication, without long-term current use of insulin were also pertinent to this visit., who is referred here for evaluation treatment of bladder cancer.  The patient has been feeling weak and for T for about 2 months.  Urinalysis shows hematuria or and she was referred to Urology.  CT scan with IV contrast demonstrates a bladder mass. Biopsy shows muscle invasive bladder cancer.     The patient lost about 10 lb over the past 2 months.  She denies any abdominal pain chest pain short of breath.     ==2019 completed concurrent chemoradiation therapy as a bladder preservation          Malignant 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    2019 Initial Diagnosis     Malignant 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      10/21/2019 Cancer Staged     Staging form: Urinary Bladder, AJCC 8th Edition  - Clinical: Stage II (cT2, cN0, cM0)      10/31/2019 -  Chemotherapy     Treatment Summary   Plan Name: cisplatin taxol weekly  Treatment Goal: Curative  Status: Active  Start Date: 10/31/2019  End Date:  12/12/2019 (Planned)  Provider: Hoda Josue NP  Chemotherapy: CISplatin (PLATINOL) 20 mg/m2 = 34 mg in sodium chloride 0.9% 500 mL chemo infusion, 20 mg/m2 = 34 mg (100 % of original dose 20 mg/m2), Intravenous, Clinic/HOD 1 time, 6 of 6 cycles  Dose modification: 20 mg/m2 (original dose 20 mg/m2, Cycle 1)  PACLitaxel (TAXOL) 50 mg/m2 = 84 mg in sodium chloride 0.9% 500 mL chemo infusion, 50 mg/m2 = 84 mg (100 % of original dose 50 mg/m2), Intravenous, Clinic/HOD 1 time, 6 of 6 cycles  Dose modification: 50 mg/m2 (original dose 50 mg/m2, Cycle 1)         Oncologic History:      Oncologic History     Oncologic Treatment     Pathology      Past Medical History:   Past Medical History:   Diagnosis Date    Absolute anemia 12/5/2019    Allergy     Anxiety     Arthritis     Bladder cancer     Cataract     Removed in May 2019    Depression     Diabetes mellitus     Hypertension     Malignant 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 9/25/2019    Type 2 diabetes mellitus without complication, without long-term current use of insulin 9/25/2019       Past Surgical HIstory:   Past Surgical History:   Procedure Laterality Date    BLADDER TUMOR EXCISION  09/2019    CATARACT EXTRACTION, BILATERAL  05/2019       Allergies:  Review of patient's allergies indicates:   Allergen Reactions    Aspirin        Medications:  Current Outpatient Medications   Medication Sig Dispense Refill    diphenoxylate-atropine 2.5-0.025 mg (LOMOTIL) 2.5-0.025 mg per tablet       glimepiride (AMARYL) 2 MG tablet       HYDROcodone-acetaminophen (NORCO) 5-325 mg per tablet       indapamide (LOZOL) 2.5 MG Tab       LANTUS SOLOSTAR U-100 INSULIN glargine 100 units/mL (3mL) SubQ pen       levothyroxine (SYNTHROID) 50 MCG tablet       loratadine (CLARITIN) 10 mg tablet       losartan (COZAAR) 25 MG tablet       meloxicam (MOBIC) 7.5 MG tablet       metFORMIN (GLUCOPHAGE-XR) 500 MG 24 hr tablet       metoprolol tartrate  (LOPRESSOR) 50 MG tablet       ondansetron (ZOFRAN) 8 MG tablet Take 1 tablet (8 mg total) by mouth every 12 (twelve) hours as needed for Nausea. 30 tablet 2    simvastatin (ZOCOR) 20 MG tablet       traZODone (DESYREL) 50 MG tablet        No current facility-administered medications for this visit.        Family History:   Family History   Problem Relation Age of Onset    No Known Problems Mother     No Known Problems Father     Breast cancer Sister     Prostate cancer Brother     No Known Problems Maternal Aunt     No Known Problems Maternal Uncle     No Known Problems Paternal Aunt     No Known Problems Paternal Uncle     No Known Problems Maternal Grandmother     No Known Problems Maternal Grandfather     No Known Problems Paternal Grandmother     No Known Problems Paternal Grandfather     Breast cancer Sister     Prostate cancer Brother        Social History:  reports that she has never smoked. She has never used smokeless tobacco. She reports that she drank alcohol. She reports that she does not use drugs.    Review of Systemas  Constitutional: No change in weight, appetie, fatigue, fever, or night sweats  Eyes: No changes in vision  Ears, Nose, Mouth, Throat, and Face: No hearing problems, ear pain, rummy nose, or sore throat  Respiratory: No shortness of breath or cough  Cardiovascular: No chest pain, palpitations or dizziness  Gastrointestinal: No abdominal pain, hematochezia, melena  Genitourinary: No dysuria, hematuria, nocturia, menstrual problems, post menopausal  Integumentary/Breast: No rashes or itching  Hematologic/Lymphatic: No anemia or bleeding abnormalities  Musculoskeletal: No joint or muscle abnormalities  Neurological: No sensory or motor problems, no headaches  Behavioral/Psych: No depression, anxiety, cognitive problems, or stress  Endocrine: No diabetes or thyroid problems  Allergic/Immunologic: See allergy above    Physical Exam      Vitals:   Vitals:    01/23/20 1030  "  BP: (!) 142/80   BP Location: Left arm   Patient Position: Sitting   BP Method: Medium (Manual)   Pulse: 84   Resp: 20   Temp: 97 °F (36.1 °C)   SpO2: 98%   Weight: 69.3 kg (152 lb 12.8 oz)   Height: 5' 3" (1.6 m)     BMI: Body mass index is 27.07 kg/m².  BSA Body surface area is 1.76 meters squared.  ECOG Performance Status:   ECOG SCORE    1 - Restricted in strenuous activity-ambulatory and able to carry out work of a light nature      ECOG 1    GENERAL APPEARANCE: Well developed, well nourished, in no acute distress.  SKIN: Inspection of the skin reveals no rashes, ulcerations or petechiae.  HEENT: The sclerae were anicteric and conjunctivae were pink and moist. Extraocular movements were intact and pupils were equal, round, and reactive to light with normal accommodation. External inspection of the ears and nose showed no scars, lesions, or masses. Lips, teeth, and gums showed normal mucosa. The oral mucosa, hard and soft palate, tongue and posterior pharynx were normal.  NECK: Supple and symmetric. There was no thyroid enlargement, and no tenderness, or masses were felt.  CRESPIRATORY: Normal AP diameter and normal contour without any kyphoscoliosis. LUNGS: Auscultation of the lungs revealed normal breath sounds without any other adventitious sounds or rubs.  CARDIOVASCULAR: There was a regular rate and rhythm without any murmurs, gallops, rubs. The carotid pulses were normal and 2+ bilaterally without bruits. Peripheral pulses were 2+ and symmetric.  GASTROINTESTINAL: Soft and nontender with normal bowel sounds. The liver span was approximately 5-6 cm in the right midclavicular line by percussion. The liver edge was nontender. The spleen was not palpable. There were no inguinal or umbilical hernias noted. No ascites was noted.  LYMPH NODES: No lymphadenopathy was appreciated in the neck, axillae or groin.  MUSCULOSKELETAL: Gait was normal. There was no tenderness or effusions noted. Muscle strength and tone " were normal. EXTREMITIES: No cyanosis, clubbing or edema.  NEUROLOGIC: Alert and oriented x 3. Normal affect. Gait was normal. Normal deep tendon reflexes with no pathological reflexes. Sensation to touch was normal.  PSYCHIATRIC: good mood, orientated to place, time and person     Labs and Imagings     Orders Only on 10/14/2019   Component Date Value Ref Range Status    Glucose 10/14/2019 164* 82 - 115 mg/dL Final    BUN, Bld 10/14/2019 15.8  8 - 23 mg/dL Final    Creatinine 10/14/2019 0.74  0.50 - 0.90 mg/dL Final    AST 10/14/2019 11  0 - 32 U/L Final    ALT (SGPT) 10/14/2019 11  0 - 33 U/L Final    Alkaline Phosphatase 10/14/2019 34* 35 - 105 U/L Final    Calcium 10/14/2019 9.9  8.6 - 10.2 mg/dL Final    Protein, Total 10/14/2019 7.1  6.4 - 8.3 g/dL Final    Albumin 10/14/2019 4.3  3.5 - 5.2 g/dL Final    BILIRUBIN, TOTAL 10/14/2019 0.31  0.00 - 1.20 mg/dL Final    Sodium 10/14/2019 133* 136 - 145 mmol/L Final    Potassium 10/14/2019 4.4  3.5 - 5.1 mmol/L Final    Chloride 10/14/2019 97* 98 - 107 mmol/L Final    CO2 10/14/2019 28  22 - 29 mmol/L Final    Globulin 10/14/2019 2.8  1.5 - 4.5 g/dL Final    Albumin/Globulin Ratio 10/14/2019 1.5  1.0 - 2.7 Final    BUN/Creatinine Ratio 10/14/2019 21.4* 6 - 20 Final    GFR ESTIMATION 10/14/2019 74.24  >60.00 Final    Comment: GFR ESTIMATION IS REPORTED AS ML/MIN/1.73M SQUARED.  IT IS RECOMMENDED  THAT GFR VALUES FOR  BE MULTIPLIED X 1.212.  THE  HIGHER THE GFR, THE BETTER THE KIDNEY FUNCTION.  A GFR OF >60 IS  CONSIDERED NORMAL (DEPENDING ON AGE AND WHETHER THE PATIENT IS A MALE  OR FEMALE.      Anion Gap 10/14/2019 8.0  8.0 - 17.0 mmol/L Final    Comment: NOTE  Testing performed at:  [facility], [address] [place] [address]  [address] CLIA #:[id number]      WBC 10/14/2019 4.56  4.3 - 10.8 X 10 3/ul Final    RBC 10/14/2019 4.08* 4.2 - 5.4 X 10 6/ul Final    RDW-SD 10/14/2019 44.4  37 - 54 fl Final    Hemoglobin  10/14/2019 11.9* 12 - 16 g/dL Final    Hematocrit 10/14/2019 35.8* 37 - 47 % Final    MCV 10/14/2019 87.7  82 - 100 fl Final    MCH 10/14/2019 29.2  27 - 32 pg Final    MCHC 10/14/2019 33.2  32 - 36 g/dL Final    Platelets 10/14/2019 229  135 - 400 X 10 3/ul Final    Neutrophils 10/14/2019 39.8  % Final    Lymphocytes 10/14/2019 38.2  19.3 - 53.1 % Final    Monocytes 10/14/2019 15.6* 4.7 - 12.5 % Final    Eosinophils 10/14/2019 5.3  0.7 - 7.0 % Final    Basophils 10/14/2019 0.9  0.2 - 1.2 % Final    Neutrophils Absolute 10/14/2019 1.82* 2.15 - 7.56 X 10 3/ul Final    ABSOLUTE LYMPHOCYTE 10/14/2019 1.74  0.86 - 4.75 X 10 3/ul Final    ABSOLUTE MONOCYTE 10/14/2019 0.71  0.22 - 1.08 X 10 3/ul Final    ABSOLUTE EOSINOPHIL 10/14/2019 0.24  0.04 - 0.54 X 10 3/ul Final    ABSOLUTE BASOPHIL 10/14/2019 0.04  0.00 - 0.22 X 10 3/ul Final    ABSOLUTE IMMATURE GRAN 10/14/2019 0.01  0 - 0.04 X 10 3/ul Final    Immature Granulocytes 10/14/2019 0.2  0 - 0.5 % Final    IG INCLUDES METAMYELOCYTES, MYELOCYTES, AND PROMYELOCYTES    nRBC# 10/14/2019 0.0  0 - 0.2 /100 WBC Final    ABS NRBC COUNT 10/14/2019 0.000  0 - 0.012 x 10 3/ul Final    Comment: NOTE  Testing performed at:  [facility], [address] [place] [address]  [address] CLIA #:[id number]     Orders Only on 10/07/2019   Component Date Value Ref Range Status    Glucose 10/07/2019 186* 82 - 115 mg/dL Final    BUN, Bld 10/07/2019 12.0  8 - 23 mg/dL Final    Creatinine 10/07/2019 0.83  0.50 - 0.90 mg/dL Final    AST 10/07/2019 12  0 - 32 U/L Final    ALT (SGPT) 10/07/2019 10  0 - 33 U/L Final    Alkaline Phosphatase 10/07/2019 37  35 - 105 U/L Final    Calcium 10/07/2019 10.1  8.6 - 10.2 mg/dL Final    Protein, Total 10/07/2019 7.5  6.4 - 8.3 g/dL Final    Albumin 10/07/2019 4.6  3.5 - 5.2 g/dL Final    BILIRUBIN, TOTAL 10/07/2019 0.24  0.00 - 1.20 mg/dL Final    Sodium 10/07/2019 130* 136 - 145 mmol/L Final    Potassium 10/07/2019 4.6   3.5 - 5.1 mmol/L Final    Chloride 10/07/2019 92* 98 - 107 mmol/L Final    CO2 10/07/2019 28  22 - 29 mmol/L Final    Globulin 10/07/2019 2.9  1.5 - 4.5 g/dL Final    Albumin/Globulin Ratio 10/07/2019 1.6  1.0 - 2.7 Final    BUN/Creatinine Ratio 10/07/2019 14.5  6 - 20 Final    GFR ESTIMATION 10/07/2019 65.03  >60.00 Final    Comment: GFR ESTIMATION IS REPORTED AS ML/MIN/1.73M SQUARED.  IT IS RECOMMENDED  THAT GFR VALUES FOR  BE MULTIPLIED X 1.212.  THE  HIGHER THE GFR, THE BETTER THE KIDNEY FUNCTION.  A GFR OF >60 IS  CONSIDERED NORMAL (DEPENDING ON AGE AND WHETHER THE PATIENT IS A MALE  OR FEMALE.      Anion Gap 10/07/2019 10.0  8.0 - 17.0 mmol/L Final    Comment: NOTE  Testing performed at:  [facility], [address] [place] [address] CLIA #:[id number]      WBC 10/07/2019 4.90  4.3 - 10.8 X 10 3/ul Final    RBC 10/07/2019 4.39  4.2 - 5.4 X 10 6/ul Final    RDW-SD 10/07/2019 41.9  37 - 54 fl Final    Hemoglobin 10/07/2019 12.9  12 - 16 g/dL Final    Hematocrit 10/07/2019 38.1  37 - 47 % Final    MCV 10/07/2019 86.8  82 - 100 fl Final    MCH 10/07/2019 29.4  27 - 32 pg Final    MCHC 10/07/2019 33.9  32 - 36 g/dL Final    Platelets 10/07/2019 239  135 - 400 X 10 3/ul Final    Neutrophils 10/07/2019 33.5  % Final    Lymphocytes 10/07/2019 45.7  19.3 - 53.1 % Final    Monocytes 10/07/2019 16.3* 4.7 - 12.5 % Final    Eosinophils 10/07/2019 3.7  0.7 - 7.0 % Final    Basophils 10/07/2019 0.6  0.2 - 1.2 % Final    Neutrophils Absolute 10/07/2019 1.64* 2.15 - 7.56 X 10 3/ul Final    ABSOLUTE LYMPHOCYTE 10/07/2019 2.24  0.86 - 4.75 X 10 3/ul Final    ABSOLUTE MONOCYTE 10/07/2019 0.80  0.22 - 1.08 X 10 3/ul Final    ABSOLUTE EOSINOPHIL 10/07/2019 0.18  0.04 - 0.54 X 10 3/ul Final    ABSOLUTE BASOPHIL 10/07/2019 0.03  0.00 - 0.22 X 10 3/ul Final    ABSOLUTE IMMATURE GRAN 10/07/2019 0.01  0 - 0.04 X 10 3/ul Final    Immature Granulocytes 10/07/2019 0.2  0 - 0.5 %  Final    IG INCLUDES METAMYELOCYTES, MYELOCYTES, AND PROMYELOCYTES    nRBC# 10/07/2019 0.0  0 - 0.2 /100 WBC Final    ABS NRBC COUNT 10/07/2019 0.000  0 - 0.012 x 10 3/ul Final    Comment: NOTE  Testing performed at:  [facility], [address] [place] [address]  [address] CLIA #:[id number]       All cell laboratory reviewed which shows white cell 1.7 and sodium 126  Imaging:     Assessment:     Principle Problem: Malignant 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 [C67.3]   Co-morbidity/Active Problems:   Patient Active Problem List   Diagnosis    Malignant 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    Essential hypertension    Type 2 diabetes mellitus without complication, without long-term current use of insulin    Encounter for antineoplastic chemotherapy    Chemotherapy-induced nausea    Chemotherapy-induced neutropenia    Absolute anemia        Sherice Haji is a 87 y.o. 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 A diagnosis of Type 2 diabetes mellitus without complication, without long-term current use of insulin was also pertinent to this visit., with Malignant 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 [C67.3]   Sherice Haji is a 87 y.o. 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 [C67.3]   87-year-old female otherwise healthy except hypertension diabetes present with bladder mass biopsy-proven muscle invasive bladder cancer( stage II) and     biopsy-proven muscle invasive bladder cancer( stage II)   Normal renal function  Neutropenia  On chemo radiation therapy    Plan:   Overall Plan:  Concurrent chemoradiation  therapy, followed by optional cysctemy and full dose chemo C/G    --complete chemoradiation  --the overall plan is to concurrent chemoradiation therapy and bladder preservation.  If there is residual disease after completion of chemoradiation therapy the option will be cystectomy versus second-line immunotherapy to induce a local response in the bladder.  In easch case, she need full dose chemo C/G  After completion of local therapy.     Plan    --to see Urology and consider repeat cystoscope to evaluate response Adalid Valera MD [facility] [place] [id number] [phone]  Appointment mid February 28  --lab tests CBC CMP 6 weeks  --return to clinic 6 weeks for concerning more treatment; if we decide to give her chemotherapy I will use 75% of full dose given her advanced age    Signature    Misael Gordon M.D., Ph.D., [facility]  Hematology-Oncology    Signed 1/23/2020 9:47 AM   Note Ends Here  See

## 2020-02-28 ENCOUNTER — OFFICE VISIT (OUTPATIENT)
Dept: UROLOGY | Facility: CLINIC | Age: 85
End: 2020-02-28
Payer: COMMERCIAL

## 2020-02-28 VITALS
HEART RATE: 98 BPM | WEIGHT: 152 LBS | SYSTOLIC BLOOD PRESSURE: 190 MMHG | HEIGHT: 63 IN | DIASTOLIC BLOOD PRESSURE: 96 MMHG | BODY MASS INDEX: 26.93 KG/M2 | RESPIRATION RATE: 17 BRPM

## 2020-02-28 DIAGNOSIS — C67.9 MALIGNANT NEOPLASM OF URINARY BLADDER, UNSPECIFIED SITE: Primary | ICD-10-CM

## 2020-02-28 PROCEDURE — 1101F PT FALLS ASSESS-DOCD LE1/YR: CPT | Mod: CPTII,S$GLB,, | Performed by: SPECIALIST

## 2020-02-28 PROCEDURE — 99213 PR OFFICE/OUTPT VISIT, EST, LEVL III, 20-29 MIN: ICD-10-PCS | Mod: S$GLB,,, | Performed by: SPECIALIST

## 2020-02-28 PROCEDURE — 1159F MED LIST DOCD IN RCRD: CPT | Mod: S$GLB,,, | Performed by: SPECIALIST

## 2020-02-28 PROCEDURE — 99213 OFFICE O/P EST LOW 20 MIN: CPT | Mod: S$GLB,,, | Performed by: SPECIALIST

## 2020-02-28 PROCEDURE — 1101F PR PT FALLS ASSESS DOC 0-1 FALLS W/OUT INJ PAST YR: ICD-10-PCS | Mod: CPTII,S$GLB,, | Performed by: SPECIALIST

## 2020-02-28 PROCEDURE — 1126F PR PAIN SEVERITY QUANTIFIED, NO PAIN PRESENT: ICD-10-PCS | Mod: S$GLB,,, | Performed by: SPECIALIST

## 2020-02-28 PROCEDURE — 1159F PR MEDICATION LIST DOCUMENTED IN MEDICAL RECORD: ICD-10-PCS | Mod: S$GLB,,, | Performed by: SPECIALIST

## 2020-02-28 PROCEDURE — 1126F AMNT PAIN NOTED NONE PRSNT: CPT | Mod: S$GLB,,, | Performed by: SPECIALIST

## 2020-02-28 NOTE — PROGRESS NOTES
Subjective:       Patient ID: Sherice Haji is a 87 y.o. female.    Chief Complaint: Follow-up (f/u per RODERICK)      HPI:  87-year-old  female very strong for her age who was diagnosed with muscle invasive bladder cancer after we met her in consultation in the hospital when she presented with gross hematuria.  Following this diagnosis treatment options were presented to her.  They elected to for chemoradiation as an option appropriate for age.  The family understood that this was not the standard of care.    She completed chemotherapy December 5, 2019 and completed radiotherapy December 17th thing 2019.  She is here today for interim followup.    Most of the symptoms he is reporting today are a related to bowel see alternates between constipation and diarrhea.  She has no urinary symptoms and voids completely.    Past Medical History:   Past Medical History:   Diagnosis Date    Absolute anemia 12/5/2019    Allergy     Anxiety     Arthritis     Bladder cancer     Cataract     Removed in May 2019    Depression     Diabetes mellitus     Hypertension     Malignant 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 9/25/2019    Type 2 diabetes mellitus without complication, without long-term current use of insulin 9/25/2019       Past Surgical Historical:   Past Surgical History:   Procedure Laterality Date    BLADDER TUMOR EXCISION  09/2019    CATARACT EXTRACTION, BILATERAL  05/2019        Medications:   Medication List with Changes/Refills   Current Medications    DIPHENOXYLATE-ATROPINE 2.5-0.025 MG (LOMOTIL) 2.5-0.025 MG PER TABLET        GLIMEPIRIDE (AMARYL) 2 MG TABLET        HYDROCODONE-ACETAMINOPHEN (NORCO) 5-325 MG PER TABLET        INDAPAMIDE (LOZOL) 2.5 MG TAB        LANTUS SOLOSTAR U-100 INSULIN GLARGINE 100 UNITS/ML (3ML) SUBQ PEN        LEVOTHYROXINE (SYNTHROID) 50 MCG TABLET        LORATADINE (CLARITIN) 10 MG TABLET        LOSARTAN (COZAAR) 25 MG TABLET        MELOXICAM (MOBIC)  7.5 MG TABLET        METFORMIN (GLUCOPHAGE-XR) 500 MG 24 HR TABLET        METOPROLOL TARTRATE (LOPRESSOR) 50 MG TABLET        ONDANSETRON (ZOFRAN) 8 MG TABLET    Take 1 tablet (8 mg total) by mouth every 12 (twelve) hours as needed for Nausea.    SIMVASTATIN (ZOCOR) 20 MG TABLET        TRAZODONE (DESYREL) 50 MG TABLET            Past Social History:   Social History     Socioeconomic History    Marital status:      Spouse name: Not on file    Number of children: Not on file    Years of education: Not on file    Highest education level: Not on file   Occupational History    Not on file   Social Needs    Financial resource strain: Not on file    Food insecurity:     Worry: Not on file     Inability: Not on file    Transportation needs:     Medical: Not on file     Non-medical: Not on file   Tobacco Use    Smoking status: Never Smoker    Smokeless tobacco: Never Used   Substance and Sexual Activity    Alcohol use: Not Currently    Drug use: Never    Sexual activity: Not Currently   Lifestyle    Physical activity:     Days per week: Not on file     Minutes per session: Not on file    Stress: Not on file   Relationships    Social connections:     Talks on phone: Not on file     Gets together: Not on file     Attends Restorationist service: Not on file     Active member of club or organization: Not on file     Attends meetings of clubs or organizations: Not on file     Relationship status: Not on file   Other Topics Concern    Not on file   Social History Narrative    Not on file       Allergies:   Review of patient's allergies indicates:   Allergen Reactions    Aspirin         Family History:   Family History   Problem Relation Age of Onset    No Known Problems Mother     No Known Problems Father     Breast cancer Sister     Prostate cancer Brother     No Known Problems Maternal Aunt     No Known Problems Maternal Uncle     No Known Problems Paternal Aunt     No Known Problems Paternal Uncle      No Known Problems Maternal Grandmother     No Known Problems Maternal Grandfather     No Known Problems Paternal Grandmother     No Known Problems Paternal Grandfather     Breast cancer Sister     Prostate cancer Brother         Review of Systems:   systems reviewed and notable for muscle invasive bladder cancer  All other systems were reviewed Neg except as stated in the HPI    Physical Exam:  General: A&Ox3. No apparent distress. No deformities.  Neck: No masses. Normal thyroid.  Lungs: normal inspiration. No use of accessory muscles.  Heart: normal pulse. No arrhythmias.  Abdomen: Soft. NT. ND. No masses. No hernias. No hepatosplenomegaly.  Lymphatic: Neck and groin nodes negative.  Skin: The skin is warm and dry. No jaundice.  Neurology: Cranial nerves 2-12 crossly intact, no focal weaknesses, no sensation deficits, no motor deficits  Ext: No clubbing, cyanosis or edema.  :  Deferred      Assessment/Plan:       87-year-old female with muscle invasive bladder cancer status post chemo radiation here for follow-up.    1.  A put on the schedule for sometime in late March 2020 to perform cystoscopy in the office.  She has agreed with this plan.    Problem List Items Addressed This Visit        Oncology    Malignant neoplasm of urinary bladder - Primary

## 2020-03-05 ENCOUNTER — OFFICE VISIT (OUTPATIENT)
Dept: HEMATOLOGY/ONCOLOGY | Facility: CLINIC | Age: 85
End: 2020-03-05
Payer: COMMERCIAL

## 2020-03-05 VITALS
HEART RATE: 89 BPM | OXYGEN SATURATION: 97 % | WEIGHT: 159 LBS | RESPIRATION RATE: 16 BRPM | HEIGHT: 63 IN | SYSTOLIC BLOOD PRESSURE: 178 MMHG | BODY MASS INDEX: 28.17 KG/M2 | TEMPERATURE: 98 F | DIASTOLIC BLOOD PRESSURE: 97 MMHG

## 2020-03-05 DIAGNOSIS — I10 ESSENTIAL HYPERTENSION: ICD-10-CM

## 2020-03-05 DIAGNOSIS — E11.9 TYPE 2 DIABETES MELLITUS WITHOUT COMPLICATION, WITHOUT LONG-TERM CURRENT USE OF INSULIN: ICD-10-CM

## 2020-03-05 DIAGNOSIS — D63.0 ANEMIA IN NEOPLASTIC DISEASE: ICD-10-CM

## 2020-03-05 DIAGNOSIS — C67.3 MALIGNANT NEOPLASM OF ANTERIOR WALL OF URINARY BLADDER: Primary | ICD-10-CM

## 2020-03-05 PROCEDURE — 99214 PR OFFICE/OUTPT VISIT, EST, LEVL IV, 30-39 MIN: ICD-10-PCS | Mod: S$GLB,,, | Performed by: INTERNAL MEDICINE

## 2020-03-05 PROCEDURE — 1126F PR PAIN SEVERITY QUANTIFIED, NO PAIN PRESENT: ICD-10-PCS | Mod: S$GLB,,, | Performed by: INTERNAL MEDICINE

## 2020-03-05 PROCEDURE — 1101F PR PT FALLS ASSESS DOC 0-1 FALLS W/OUT INJ PAST YR: ICD-10-PCS | Mod: CPTII,S$GLB,, | Performed by: INTERNAL MEDICINE

## 2020-03-05 PROCEDURE — 1159F MED LIST DOCD IN RCRD: CPT | Mod: S$GLB,,, | Performed by: INTERNAL MEDICINE

## 2020-03-05 PROCEDURE — 1159F PR MEDICATION LIST DOCUMENTED IN MEDICAL RECORD: ICD-10-PCS | Mod: S$GLB,,, | Performed by: INTERNAL MEDICINE

## 2020-03-05 PROCEDURE — 1101F PT FALLS ASSESS-DOCD LE1/YR: CPT | Mod: CPTII,S$GLB,, | Performed by: INTERNAL MEDICINE

## 2020-03-05 PROCEDURE — 99214 OFFICE O/P EST MOD 30 MIN: CPT | Mod: S$GLB,,, | Performed by: INTERNAL MEDICINE

## 2020-03-05 PROCEDURE — 1126F AMNT PAIN NOTED NONE PRSNT: CPT | Mod: S$GLB,,, | Performed by: INTERNAL MEDICINE

## 2020-03-05 NOTE — PROGRESS NOTES
Hematology Oncology Progress Note    Hematology Oncology  [facility]     PATIENT: Sherice Haji  : 1932 87 y.o. female  MRN [id number]     PCP: Sejal Castillo MD  Consult Requested By:  No att. providers found    Date of Service: 3/5/2020    Subjective:   Interim History:  lab results (6wk f/u)    The patient here for follow-up, the patient completed chemoradiation therapy, she she saw a urologist and the cystoscope was Plan     Oncology History:      Sherice Haji is here for to followup for bladder cancer.  Current therapy patient doing well without new complaints she specifically denies hematuria or weight loss she has a good appetite.  This is a 87 y.o. female patient with a history of The primary encounter diagnosis was Malignant 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. Diagnoses of Essential hypertension and Type 2 diabetes mellitus without complication, without long-term current use of insulin were also pertinent to this visit., who is referred here for evaluation treatment of bladder cancer.  The patient has been feeling weak and for T for about 2 months.  Urinalysis shows hematuria or and she was referred to Urology.  CT scan with IV contrast demonstrates a bladder mass. Biopsy shows muscle invasive bladder cancer.     The patient lost about 10 lb over the past 2 months.  She denies any abdominal pain chest pain short of breath.     ==2019 completed concurrent chemoradiation therapy as a bladder preservation          Malignant neoplasm of urinary bladder    2019 Initial Diagnosis     Malignant 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      10/21/2019 Cancer Staged     Staging form: Urinary Bladder, AJCC 8th Edition  - Clinical: Stage II (cT2, cN0, cM0)      10/31/2019 -  Chemotherapy     Treatment Summary   Plan Name: cisplatin taxol weekly  Treatment Goal: Curative  Status: Active  Start Date: 10/31/2019  End Date: 2019  (Planned)  Provider: Hoda Josue NP  Chemotherapy: CISplatin (PLATINOL) 20 mg/m2 = 34 mg in sodium chloride 0.9% 500 mL chemo infusion, 20 mg/m2 = 34 mg (100 % of original dose 20 mg/m2), Intravenous, Clinic/HOD 1 time, 6 of 6 cycles  Dose modification: 20 mg/m2 (original dose 20 mg/m2, Cycle 1)  PACLitaxel (TAXOL) 50 mg/m2 = 84 mg in sodium chloride 0.9% 500 mL chemo infusion, 50 mg/m2 = 84 mg (100 % of original dose 50 mg/m2), Intravenous, Clinic/HOD 1 time, 6 of 6 cycles  Dose modification: 50 mg/m2 (original dose 50 mg/m2, Cycle 1)         Oncologic History:      Oncologic History     Oncologic Treatment     Pathology      Past Medical History:   Past Medical History:   Diagnosis Date    Absolute anemia 12/5/2019    Allergy     Anxiety     Arthritis     Bladder cancer     Cataract     Removed in May 2019    Depression     Diabetes mellitus     Hypertension     Malignant 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 9/25/2019    Type 2 diabetes mellitus without complication, without long-term current use of insulin 9/25/2019       Past Surgical HIstory:   Past Surgical History:   Procedure Laterality Date    BLADDER TUMOR EXCISION  09/2019    CATARACT EXTRACTION, BILATERAL  05/2019       Allergies:  Review of patient's allergies indicates:   Allergen Reactions    Aspirin        Medications:  Current Outpatient Medications   Medication Sig Dispense Refill    diphenoxylate-atropine 2.5-0.025 mg (LOMOTIL) 2.5-0.025 mg per tablet       glimepiride (AMARYL) 2 MG tablet       HYDROcodone-acetaminophen (NORCO) 5-325 mg per tablet       indapamide (LOZOL) 2.5 MG Tab       LANTUS SOLOSTAR U-100 INSULIN glargine 100 units/mL (3mL) SubQ pen       levothyroxine (SYNTHROID) 50 MCG tablet       loratadine (CLARITIN) 10 mg tablet       losartan (COZAAR) 25 MG tablet       meloxicam (MOBIC) 7.5 MG tablet       metFORMIN (GLUCOPHAGE-XR) 500 MG 24 hr tablet       metoprolol tartrate (LOPRESSOR) 50 MG  tablet       ondansetron (ZOFRAN) 8 MG tablet Take 1 tablet (8 mg total) by mouth every 12 (twelve) hours as needed for Nausea. 30 tablet 2    simvastatin (ZOCOR) 20 MG tablet       traZODone (DESYREL) 50 MG tablet        No current facility-administered medications for this visit.        Family History:   Family History   Problem Relation Age of Onset    No Known Problems Mother     No Known Problems Father     Breast cancer Sister     Prostate cancer Brother     No Known Problems Maternal Aunt     No Known Problems Maternal Uncle     No Known Problems Paternal Aunt     No Known Problems Paternal Uncle     No Known Problems Maternal Grandmother     No Known Problems Maternal Grandfather     No Known Problems Paternal Grandmother     No Known Problems Paternal Grandfather     Breast cancer Sister     Prostate cancer Brother        Social History:  reports that she has never smoked. She has never used smokeless tobacco. She reports that she drank alcohol. She reports that she does not use drugs.    Review of Systemas  Constitutional: No change in weight, appetie, fatigue, fever, or night sweats  Eyes: No changes in vision  Ears, Nose, Mouth, Throat, and Face: No hearing problems, ear pain, rummy nose, or sore throat  Respiratory: No shortness of breath or cough  Cardiovascular: No chest pain, palpitations or dizziness  Gastrointestinal: No abdominal pain, hematochezia, melena  Genitourinary: No dysuria, hematuria, nocturia, menstrual problems, post menopausal  Integumentary/Breast: No rashes or itching  Hematologic/Lymphatic: No anemia or bleeding abnormalities  Musculoskeletal: No joint or muscle abnormalities  Neurological: No sensory or motor problems, no headaches  Behavioral/Psych: No depression, anxiety, cognitive problems, or stress  Endocrine: No diabetes or thyroid problems  Allergic/Immunologic: See allergy above    Physical Exam      Vitals:   Vitals:    03/05/20 0944   BP: (!) 178/97   BP  "Location: Right arm   Patient Position: Sitting   BP Method: X-Large (Automatic)   Pulse: 89   Resp: 16   Temp: 97.6 °F (36.4 °C)   SpO2: 97%   Weight: 72.1 kg (159 lb)   Height: 5' 3" (1.6 m)     BMI: Body mass index is 28.17 kg/m².  BSA Body surface area is 1.79 meters squared.  ECOG Performance Status:   ECOG SCORE        ECOG 1    GENERAL APPEARANCE: Well developed, well nourished, in no acute distress.  SKIN: Inspection of the skin reveals no rashes, ulcerations or petechiae.  HEENT: The sclerae were anicteric and conjunctivae were pink and moist. Extraocular movements were intact and pupils were equal, round, and reactive to light with normal accommodation. External inspection of the ears and nose showed no scars, lesions, or masses. Lips, teeth, and gums showed normal mucosa. The oral mucosa, hard and soft palate, tongue and posterior pharynx were normal.  NECK: Supple and symmetric. There was no thyroid enlargement, and no tenderness, or masses were felt.  CRESPIRATORY: Normal AP diameter and normal contour without any kyphoscoliosis. LUNGS: Auscultation of the lungs revealed normal breath sounds without any other adventitious sounds or rubs.  CARDIOVASCULAR: There was a regular rate and rhythm without any murmurs, gallops, rubs. The carotid pulses were normal and 2+ bilaterally without bruits. Peripheral pulses were 2+ and symmetric.  GASTROINTESTINAL: Soft and nontender with normal bowel sounds. The liver span was approximately 5-6 cm in the right midclavicular line by percussion. The liver edge was nontender. The spleen was not palpable. There were no inguinal or umbilical hernias noted. No ascites was noted.  LYMPH NODES: No lymphadenopathy was appreciated in the neck, axillae or groin.  MUSCULOSKELETAL: Gait was normal. There was no tenderness or effusions noted. Muscle strength and tone were normal. EXTREMITIES: No cyanosis, clubbing or edema.  NEUROLOGIC: Alert and oriented x 3. Normal affect. Gait " was normal. Normal deep tendon reflexes with no pathological reflexes. Sensation to touch was normal.  PSYCHIATRIC: good mood, orientated to place, time and person     Labs and Imagings     Orders Only on 10/14/2019   Component Date Value Ref Range Status    Glucose 10/14/2019 164* 82 - 115 mg/dL Final    BUN, Bld 10/14/2019 15.8  8 - 23 mg/dL Final    Creatinine 10/14/2019 0.74  0.50 - 0.90 mg/dL Final    AST 10/14/2019 11  0 - 32 U/L Final    ALT (SGPT) 10/14/2019 11  0 - 33 U/L Final    Alkaline Phosphatase 10/14/2019 34* 35 - 105 U/L Final    Calcium 10/14/2019 9.9  8.6 - 10.2 mg/dL Final    Protein, Total 10/14/2019 7.1  6.4 - 8.3 g/dL Final    Albumin 10/14/2019 4.3  3.5 - 5.2 g/dL Final    BILIRUBIN, TOTAL 10/14/2019 0.31  0.00 - 1.20 mg/dL Final    Sodium 10/14/2019 133* 136 - 145 mmol/L Final    Potassium 10/14/2019 4.4  3.5 - 5.1 mmol/L Final    Chloride 10/14/2019 97* 98 - 107 mmol/L Final    CO2 10/14/2019 28  22 - 29 mmol/L Final    Globulin 10/14/2019 2.8  1.5 - 4.5 g/dL Final    Albumin/Globulin Ratio 10/14/2019 1.5  1.0 - 2.7 Final    BUN/Creatinine Ratio 10/14/2019 21.4* 6 - 20 Final    GFR ESTIMATION 10/14/2019 74.24  >60.00 Final    Comment: GFR ESTIMATION IS REPORTED AS ML/MIN/1.73M SQUARED.  IT IS RECOMMENDED  THAT GFR VALUES FOR  BE MULTIPLIED X 1.212.  THE  HIGHER THE GFR, THE BETTER THE KIDNEY FUNCTION.  A GFR OF >60 IS  CONSIDERED NORMAL (DEPENDING ON AGE AND WHETHER THE PATIENT IS A MALE  OR FEMALE.      Anion Gap 10/14/2019 8.0  8.0 - 17.0 mmol/L Final    Comment: NOTE  Testing performed at:  [facility], [address] [place] [address] CLIA #:[id number]      WBC 10/14/2019 4.56  4.3 - 10.8 X 10 3/ul Final    RBC 10/14/2019 4.08* 4.2 - 5.4 X 10 6/ul Final    RDW-SD 10/14/2019 44.4  37 - 54 fl Final    Hemoglobin 10/14/2019 11.9* 12 - 16 g/dL Final    Hematocrit 10/14/2019 35.8* 37 - 47 % Final    MCV 10/14/2019 87.7  82 - 100 fl  Final    MCH 10/14/2019 29.2  27 - 32 pg Final    MCHC 10/14/2019 33.2  32 - 36 g/dL Final    Platelets 10/14/2019 229  135 - 400 X 10 3/ul Final    Neutrophils 10/14/2019 39.8  % Final    Lymphocytes 10/14/2019 38.2  19.3 - 53.1 % Final    Monocytes 10/14/2019 15.6* 4.7 - 12.5 % Final    Eosinophils 10/14/2019 5.3  0.7 - 7.0 % Final    Basophils 10/14/2019 0.9  0.2 - 1.2 % Final    Neutrophils Absolute 10/14/2019 1.82* 2.15 - 7.56 X 10 3/ul Final    ABSOLUTE LYMPHOCYTE 10/14/2019 1.74  0.86 - 4.75 X 10 3/ul Final    ABSOLUTE MONOCYTE 10/14/2019 0.71  0.22 - 1.08 X 10 3/ul Final    ABSOLUTE EOSINOPHIL 10/14/2019 0.24  0.04 - 0.54 X 10 3/ul Final    ABSOLUTE BASOPHIL 10/14/2019 0.04  0.00 - 0.22 X 10 3/ul Final    ABSOLUTE IMMATURE GRAN 10/14/2019 0.01  0 - 0.04 X 10 3/ul Final    Immature Granulocytes 10/14/2019 0.2  0 - 0.5 % Final    IG INCLUDES METAMYELOCYTES, MYELOCYTES, AND PROMYELOCYTES    nRBC# 10/14/2019 0.0  0 - 0.2 /100 WBC Final    ABS NRBC COUNT 10/14/2019 0.000  0 - 0.012 x 10 3/ul Final    Comment: NOTE  Testing performed at:  [facility], [address] [place] [address]  [id number] CLIA #:[id number]     Orders Only on 10/07/2019   Component Date Value Ref Range Status    Glucose 10/07/2019 186* 82 - 115 mg/dL Final    BUN, Bld 10/07/2019 12.0  8 - 23 mg/dL Final    Creatinine 10/07/2019 0.83  0.50 - 0.90 mg/dL Final    AST 10/07/2019 12  0 - 32 U/L Final    ALT (SGPT) 10/07/2019 10  0 - 33 U/L Final    Alkaline Phosphatase 10/07/2019 37  35 - 105 U/L Final    Calcium 10/07/2019 10.1  8.6 - 10.2 mg/dL Final    Protein, Total 10/07/2019 7.5  6.4 - 8.3 g/dL Final    Albumin 10/07/2019 4.6  3.5 - 5.2 g/dL Final    BILIRUBIN, TOTAL 10/07/2019 0.24  0.00 - 1.20 mg/dL Final    Sodium 10/07/2019 130* 136 - 145 mmol/L Final    Potassium 10/07/2019 4.6  3.5 - 5.1 mmol/L Final    Chloride 10/07/2019 92* 98 - 107 mmol/L Final    CO2 10/07/2019 28  22 - 29 mmol/L Final     Globulin 10/07/2019 2.9  1.5 - 4.5 g/dL Final    Albumin/Globulin Ratio 10/07/2019 1.6  1.0 - 2.7 Final    BUN/Creatinine Ratio 10/07/2019 14.5  6 - 20 Final    GFR ESTIMATION 10/07/2019 65.03  >60.00 Final    Comment: GFR ESTIMATION IS REPORTED AS ML/MIN/1.73M SQUARED.  IT IS RECOMMENDED  THAT GFR VALUES FOR  BE MULTIPLIED X 1.212.  THE  HIGHER THE GFR, THE BETTER THE KIDNEY FUNCTION.  A GFR OF >60 IS  CONSIDERED NORMAL (DEPENDING ON AGE AND WHETHER THE PATIENT IS A MALE  OR FEMALE.      Anion Gap 10/07/2019 10.0  8.0 - 17.0 mmol/L Final    Comment: NOTE  Testing performed at:  [facility], [address] [place] [address] CLIA #:[id number]      WBC 10/07/2019 4.90  4.3 - 10.8 X 10 3/ul Final    RBC 10/07/2019 4.39  4.2 - 5.4 X 10 6/ul Final    RDW-SD 10/07/2019 41.9  37 - 54 fl Final    Hemoglobin 10/07/2019 12.9  12 - 16 g/dL Final    Hematocrit 10/07/2019 38.1  37 - 47 % Final    MCV 10/07/2019 86.8  82 - 100 fl Final    MCH 10/07/2019 29.4  27 - 32 pg Final    MCHC 10/07/2019 33.9  32 - 36 g/dL Final    Platelets 10/07/2019 239  135 - 400 X 10 3/ul Final    Neutrophils 10/07/2019 33.5  % Final    Lymphocytes 10/07/2019 45.7  19.3 - 53.1 % Final    Monocytes 10/07/2019 16.3* 4.7 - 12.5 % Final    Eosinophils 10/07/2019 3.7  0.7 - 7.0 % Final    Basophils 10/07/2019 0.6  0.2 - 1.2 % Final    Neutrophils Absolute 10/07/2019 1.64* 2.15 - 7.56 X 10 3/ul Final    ABSOLUTE LYMPHOCYTE 10/07/2019 2.24  0.86 - 4.75 X 10 3/ul Final    ABSOLUTE MONOCYTE 10/07/2019 0.80  0.22 - 1.08 X 10 3/ul Final    ABSOLUTE EOSINOPHIL 10/07/2019 0.18  0.04 - 0.54 X 10 3/ul Final    ABSOLUTE BASOPHIL 10/07/2019 0.03  0.00 - 0.22 X 10 3/ul Final    ABSOLUTE IMMATURE GRAN 10/07/2019 0.01  0 - 0.04 X 10 3/ul Final    Immature Granulocytes 10/07/2019 0.2  0 - 0.5 % Final    IG INCLUDES METAMYELOCYTES, MYELOCYTES, AND PROMYELOCYTES    nRBC# 10/07/2019 0.0  0 - 0.2 /100 WBC Final     ABS NRBC COUNT 10/07/2019 0.000  0 - 0.012 x 10 3/ul Final    Comment: NOTE  Testing performed at:  [facility], [address] [place] [address]  [address] CLIA #:[id number]       All cell laboratory reviewed which shows white cell 1.7 and sodium 126  Imaging:     Assessment:     Principle Problem: Malignant 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 [C67.3]   Co-morbidity/Active Problems:   Patient Active Problem List   Diagnosis    Malignant neoplasm of urinary bladder    Essential hypertension    Type 2 diabetes mellitus without complication, without long-term current use of insulin    Encounter for antineoplastic chemotherapy    Chemotherapy-induced nausea    Chemotherapy-induced neutropenia    Absolute anemia        Sherice Haji is a 87 y.o. 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 Diagnoses of Type 2 diabetes mellitus without complication, without long-term current use of insulin, Anemia in neoplastic disease, and Essential hypertension were also pertinent to this visit., with Malignant 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 [C67.3]   Sherice Haji is a 87 y.o. 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 [C67.3]   87-year-old female otherwise healthy except hypertension diabetes present with bladder mass biopsy-proven muscle invasive bladder cancer( stage II) and     biopsy-proven muscle invasive bladder cancer( stage II)   Normal renal function  Neutropenia  On chemo radiation therapy    Plan:   Overall Plan:  Concurrent chemoradiation therapy, followed by optional cysctemy and full dose chemo C/G    --complete  chemoradiation  --the overall plan is to concurrent chemoradiation therapy and bladder preservation.  If there is residual disease after completion of chemoradiation therapy the option will be cystectomy versus second-line immunotherapy to induce a local response in the bladder.  In easch case, she need full dose chemo C/G  After completion of local therapy.  for concerning more treatment; if we decide to give her chemotherapy I will use 75% of full dose given her advanced age    Plan    --Urology and consider repeat cystoscope to evaluate response Adalid Valera MD [facility] [id number] [phone]  Cystoscope plan April 2nd    --return to clinic April or 13    Bella Gordon M.D., Ph.D., [facility]  Hematology-Oncology    Signed 3/5/2020 9:47 AM   Note Ends Here  See

## 2020-04-02 ENCOUNTER — PROCEDURE VISIT (OUTPATIENT)
Dept: UROLOGY | Facility: CLINIC | Age: 85
End: 2020-04-02
Payer: COMMERCIAL

## 2020-04-02 VITALS
SYSTOLIC BLOOD PRESSURE: 198 MMHG | OXYGEN SATURATION: 99 % | HEIGHT: 63 IN | WEIGHT: 157.19 LBS | RESPIRATION RATE: 16 BRPM | DIASTOLIC BLOOD PRESSURE: 94 MMHG | BODY MASS INDEX: 27.85 KG/M2 | HEART RATE: 97 BPM

## 2020-04-02 DIAGNOSIS — C67.9 MALIGNANT NEOPLASM OF URINARY BLADDER, UNSPECIFIED SITE: Primary | ICD-10-CM

## 2020-04-02 PROCEDURE — 52000 CYSTOURETHROSCOPY: CPT | Mod: S$GLB,,, | Performed by: SPECIALIST

## 2020-04-02 PROCEDURE — 52000 CYSTOSCOPY: ICD-10-PCS | Mod: S$GLB,,, | Performed by: SPECIALIST

## 2020-04-02 RX ORDER — ISOPROPYL ALCOHOL 0.75 G/1
SWAB TOPICAL
COMMUNITY
Start: 2020-02-18

## 2020-04-02 RX ORDER — CIPROFLOXACIN 500 MG/1
500 TABLET ORAL
Status: COMPLETED | OUTPATIENT
Start: 2020-04-02 | End: 2020-04-02

## 2020-04-02 RX ORDER — PEN NEEDLE, DIABETIC 32GX 5/32"
NEEDLE, DISPOSABLE MISCELLANEOUS
COMMUNITY
Start: 2020-02-18

## 2020-04-02 RX ORDER — BLOOD SUGAR DIAGNOSTIC
STRIP MISCELLANEOUS
COMMUNITY
Start: 2020-02-26 | End: 2022-02-01

## 2020-04-02 RX ORDER — BLOOD-GLUCOSE CONTROL, NORMAL
EACH MISCELLANEOUS
COMMUNITY
Start: 2020-02-26 | End: 2022-02-01

## 2020-04-02 RX ORDER — BENZONATATE 200 MG/1
CAPSULE ORAL
COMMUNITY
Start: 2020-02-20 | End: 2022-02-01

## 2020-04-02 RX ORDER — LANCETS
EACH MISCELLANEOUS
COMMUNITY
Start: 2020-02-26 | End: 2022-02-01

## 2020-04-02 RX ADMIN — CIPROFLOXACIN 500 MG: 500 TABLET ORAL at 09:04

## 2020-04-02 NOTE — PATIENT INSTRUCTIONS
Cystoscopy    Cystoscopy is a procedure that lets your doctor look directly inside your urethra and bladder. It can be used to:  · Help diagnose a problem with your urethra, bladder, or kidneys.  · Take a sample (biopsy) of bladder or urethral tissue.  · Treat certain problems (such as removing kidney stones).  · Place a stent to bypass an obstruction.  · Take special X-rays of the kidneys.  Based on the findings, your doctor may recommend other tests or treatments.  What is a cystoscope?  A cystoscope is a telescope-like instrument that contains lenses and fiberoptics (small glass wires that make bright light). The cystoscope may be straight and rigid, or flexible to bend around curves in the urethra. The doctor may look directly into the cystoscope, or project the image onto a monitor.  Getting ready  · Ask your doctor if you should stop taking any medicines before the procedure.  · Ask whether you should avoid eating or drinking anything after midnight before the procedure.  · Follow any other instructions your doctor gives you.  Tell your doctor before the exam if you:  · Take any medicines, such as aspirin or blood thinners  · Have allergies to any medicines  · Are pregnant   The procedure  Cystoscopy is done in the doctors office, surgery center, or hospital. The doctor and a nurse are present during the procedure. It takes only a few minutes, longer if a biopsy, X-ray, or treatment needs to be done.  During the procedure:  · You lie on an exam table on your back, knees bent and legs apart. You are covered with a drape.  · Your urethra and the area around it are washed. Anesthetic jelly may be applied to numb the urethra. Other pain medicine is usually not needed. In some cases, you may be offered a mild sedative to help you relax. If a more extensive procedure is to be done, such as a biopsy or kidney stone removal, general anesthesia may be needed.  · The cystoscope is inserted. A sterile fluid is put  into the bladder to expand it. You may feel pressure from this fluid.  · When the procedure is done, the cystoscope is removed.  After the procedure  If you had a sedative, general anesthesia, or spinal anesthesia, you must have someone drive you home. Once youre home:  · Drink plenty of fluids.  · You may have burning or light bleeding when you urinate--this is normal.  · Medicines may be prescribed to ease any discomfort or prevent infection. Take these as directed.  · Call your doctor if you have heavy bleeding or blood clots, burning that lasts more than a day, a fever over 100°F  (38° C), or trouble urinating.  Date Last Reviewed: 1/1/2017  © 0765-7768 [employer]. [address] [place] [address], [place] [address]. All rights reserved. This information is not intended as a substitute for professional medical care. Always follow your healthcare professional's instructions.

## 2020-04-02 NOTE — PROCEDURES
"Cystoscopy  Date/Time: 4/2/2020 9:20 AM  Performed by: Adalid Valera MD  Authorized by: Adalid Valera MD     Consent Done?:  Yes (Written)  Time out: Immediately prior to procedure a "time out" was called to verify the correct patient, procedure, equipment, support staff and site/side marked as required.    Indications: history bladder cancer    Position:  Dorsal lithotomy  Anesthesia:  Intraurethral instillation  Preparation: Patient was prepped and draped in usual sterile fashion      Scope type:  Rigid cystoscope  External exam normal: Yes    Urethra normal: Yes  Bladder neck normal: Bladder neck normal   Bladder normal: Yes      Patient tolerance:  Patient tolerated the procedure well with no immediate complications     Scar from the area previous resection was noted.  No evidence of recurrent disease.    Assessment:  Most so invasive bladder cancer status post chemoradiation.  Plan:  Return to clinic in 3 months for cystoscopy      "

## 2020-04-16 ENCOUNTER — OFFICE VISIT (OUTPATIENT)
Dept: HEMATOLOGY/ONCOLOGY | Facility: CLINIC | Age: 85
End: 2020-04-16
Payer: COMMERCIAL

## 2020-04-16 VITALS
DIASTOLIC BLOOD PRESSURE: 83 MMHG | SYSTOLIC BLOOD PRESSURE: 178 MMHG | OXYGEN SATURATION: 97 % | HEIGHT: 63 IN | BODY MASS INDEX: 27.96 KG/M2 | WEIGHT: 157.81 LBS | RESPIRATION RATE: 18 BRPM | TEMPERATURE: 98 F | HEART RATE: 88 BPM

## 2020-04-16 DIAGNOSIS — E11.9 TYPE 2 DIABETES MELLITUS WITHOUT COMPLICATION, WITHOUT LONG-TERM CURRENT USE OF INSULIN: ICD-10-CM

## 2020-04-16 DIAGNOSIS — I10 ESSENTIAL HYPERTENSION: ICD-10-CM

## 2020-04-16 DIAGNOSIS — C67.3 MALIGNANT NEOPLASM OF ANTERIOR WALL OF URINARY BLADDER: Primary | ICD-10-CM

## 2020-04-16 PROCEDURE — 1101F PT FALLS ASSESS-DOCD LE1/YR: CPT | Mod: CPTII,S$GLB,, | Performed by: INTERNAL MEDICINE

## 2020-04-16 PROCEDURE — 99214 PR OFFICE/OUTPT VISIT, EST, LEVL IV, 30-39 MIN: ICD-10-PCS | Mod: S$GLB,,, | Performed by: INTERNAL MEDICINE

## 2020-04-16 PROCEDURE — 1126F AMNT PAIN NOTED NONE PRSNT: CPT | Mod: S$GLB,,, | Performed by: INTERNAL MEDICINE

## 2020-04-16 PROCEDURE — 1126F PR PAIN SEVERITY QUANTIFIED, NO PAIN PRESENT: ICD-10-PCS | Mod: S$GLB,,, | Performed by: INTERNAL MEDICINE

## 2020-04-16 PROCEDURE — 1159F MED LIST DOCD IN RCRD: CPT | Mod: S$GLB,,, | Performed by: INTERNAL MEDICINE

## 2020-04-16 PROCEDURE — 99214 OFFICE O/P EST MOD 30 MIN: CPT | Mod: S$GLB,,, | Performed by: INTERNAL MEDICINE

## 2020-04-16 PROCEDURE — 1101F PR PT FALLS ASSESS DOC 0-1 FALLS W/OUT INJ PAST YR: ICD-10-PCS | Mod: CPTII,S$GLB,, | Performed by: INTERNAL MEDICINE

## 2020-04-16 PROCEDURE — 1159F PR MEDICATION LIST DOCUMENTED IN MEDICAL RECORD: ICD-10-PCS | Mod: S$GLB,,, | Performed by: INTERNAL MEDICINE

## 2020-04-16 NOTE — PROGRESS NOTES
Hematology Oncology Progress Note    Hematology Oncology  [facility]     PATIENT: Sherice Haji  : 1932 87 y.o. female  MRN [id number]     PCP: Sejal Castillo MD  Consult Requested By:  No att. providers found    Date of Service: 2020    Subjective:   Interim History:  Follow-up and Bladder Cancer    The patient here for follow-up, the patient completed chemoradiation therapy, she she saw a urologist and the cystoscope was Plan     Oncology History:      Sherice Haji is here for to followup for bladder cancer.  Current therapy patient doing well without new complaints she specifically denies hematuria or weight loss she has a good appetite.  This is a 87 y.o. female patient with a history of The primary encounter diagnosis was Malignant 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. Diagnoses of Essential hypertension and Type 2 diabetes mellitus without complication, without long-term current use of insulin were also pertinent to this visit., who is referred here for evaluation treatment of bladder cancer.  The patient has been feeling weak and for T for about 2 months.  Urinalysis shows hematuria or and she was referred to Urology.  CT scan with IV contrast demonstrates a bladder mass. Biopsy shows muscle invasive bladder cancer.     The patient lost about 10 lb over the past 2 months.  She denies any abdominal pain chest pain short of breath.     ==2019 completed concurrent chemoradiation therapy as a bladder preservation  ==2020 Cyst NEO in bladder          Malignant neoplasm of urinary bladder    2019 Initial Diagnosis     Malignant 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      10/21/2019 Cancer Staged     Staging form: Urinary Bladder, AJCC 8th Edition  - Clinical: Stage II (cT2, cN0, cM0)      10/31/2019 - 2019 Chemotherapy     Treatment Summary   Plan Name: cisplatin taxol weekly  Treatment Goal: Curative  Status: Inactive  Start  Date: 10/31/2019  End Date: 12/5/2019  Provider: Hoda Josue NP  Chemotherapy: CISplatin (PLATINOL) 20 mg/m2 = 34 mg in sodium chloride 0.9% 500 mL chemo infusion, 20 mg/m2 = 34 mg (100 % of original dose 20 mg/m2), Intravenous, Clinic/HOD 1 time, 6 of 6 cycles  Dose modification: 20 mg/m2 (original dose 20 mg/m2, Cycle 1)  PACLitaxel (TAXOL) 50 mg/m2 = 84 mg in sodium chloride 0.9% 500 mL chemo infusion, 50 mg/m2 = 84 mg (100 % of original dose 50 mg/m2), Intravenous, Clinic/HOD 1 time, 6 of 6 cycles  Dose modification: 50 mg/m2 (original dose 50 mg/m2, Cycle 1)      4/22/2020 -  Chemotherapy     Treatment Summary   Plan Name: OP BLADDER GEMCITABINE CISPLATIN (SPLIT DOSE CISPLATIN) Q3W  Treatment Goal: Curative  Status: Active  Start Date: 4/22/2020 (Planned)  End Date: 7/1/2020 (Planned)  Provider: Misael Gordon MD  Chemotherapy: CISplatin (PLATINOL) 35 mg/m2 = 62 mg in sodium chloride 0.9% 500 mL chemo infusion, 35 mg/m2, Intravenous, Clinic/HOD 1 time, 0 of 4 cycles  gemcitabine (GEMZAR) 1,780 mg in sodium chloride 0.9% 250 mL chemo infusion, 1,000 mg/m2, Intravenous, Clinic/HOD 1 time, 0 of 4 cycles         Oncologic History:      Oncologic History     Oncologic Treatment     Pathology      Past Medical History:   Past Medical History:   Diagnosis Date    Absolute anemia 12/5/2019    Allergy     Anxiety     Arthritis     Bladder cancer     Cataract     Removed in May 2019    Depression     Diabetes mellitus     Hypertension     Malignant 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 9/25/2019    Type 2 diabetes mellitus without complication, without long-term current use of insulin 9/25/2019       Past Surgical HIstory:   Past Surgical History:   Procedure Laterality Date    BLADDER TUMOR EXCISION  09/2019    CATARACT EXTRACTION, BILATERAL  05/2019       Allergies:  Review of patient's allergies indicates:   Allergen Reactions    Aspirin        Medications:  Current Outpatient Medications  "  Medication Sig Dispense Refill    ACCU-CHEK FASTCLIX LANCET DRUM Misc       ACCU-CHEK SMARTVIEW CONTRL SOL Soln       ACCU-CHEK SMARTVIEW TEST STRIP Strp       BD ALCOHOL SWABS PadM       benzonatate (TESSALON) 200 MG capsule       diphenoxylate-atropine 2.5-0.025 mg (LOMOTIL) 2.5-0.025 mg per tablet       DROPLET PEN NEEDLE 32 gauge x 5/32" Ndle       glimepiride (AMARYL) 2 MG tablet       HYDROcodone-acetaminophen (NORCO) 5-325 mg per tablet       indapamide (LOZOL) 2.5 MG Tab       LANTUS SOLOSTAR U-100 INSULIN glargine 100 units/mL (3mL) SubQ pen       levothyroxine (SYNTHROID) 50 MCG tablet       loratadine (CLARITIN) 10 mg tablet       losartan (COZAAR) 25 MG tablet       meloxicam (MOBIC) 7.5 MG tablet       metFORMIN (GLUCOPHAGE-XR) 500 MG 24 hr tablet       metoprolol tartrate (LOPRESSOR) 50 MG tablet       ondansetron (ZOFRAN) 8 MG tablet Take 1 tablet (8 mg total) by mouth every 12 (twelve) hours as needed for Nausea. 30 tablet 2    simvastatin (ZOCOR) 20 MG tablet       traZODone (DESYREL) 50 MG tablet        No current facility-administered medications for this visit.        Family History:   Family History   Problem Relation Age of Onset    No Known Problems Mother     No Known Problems Father     Breast cancer Sister     Prostate cancer Brother     No Known Problems Maternal Aunt     No Known Problems Maternal Uncle     No Known Problems Paternal Aunt     No Known Problems Paternal Uncle     No Known Problems Maternal Grandmother     No Known Problems Maternal Grandfather     No Known Problems Paternal Grandmother     No Known Problems Paternal Grandfather     Breast cancer Sister     Prostate cancer Brother        Social History:  reports that she has never smoked. She has never used smokeless tobacco. She reports that she drank alcohol. She reports that she does not use drugs.    Review of Systemas  Constitutional: No change in weight, appetie, fatigue, fever, or " "night sweats  Eyes: No changes in vision  Ears, Nose, Mouth, Throat, and Face: No hearing problems, ear pain, rummy nose, or sore throat  Respiratory: No shortness of breath or cough  Cardiovascular: No chest pain, palpitations or dizziness  Gastrointestinal: No abdominal pain, hematochezia, melena  Genitourinary: No dysuria, hematuria, nocturia, menstrual problems, post menopausal  Integumentary/Breast: No rashes or itching  Hematologic/Lymphatic: No anemia or bleeding abnormalities  Musculoskeletal: No joint or muscle abnormalities  Neurological: No sensory or motor problems, no headaches  Behavioral/Psych: No depression, anxiety, cognitive problems, or stress  Endocrine: No diabetes or thyroid problems  Allergic/Immunologic: See allergy above    Physical Exam      Vitals:   Vitals:    04/16/20 0946   BP: (!) 178/83   BP Location: Left arm   Patient Position: Sitting   BP Method: Small (Automatic)   Pulse: 88   Resp: 18   Temp: 97.9 °F (36.6 °C)   SpO2: 97%   Weight: 71.6 kg (157 lb 12.8 oz)   Height: 5' 3" (1.6 m)     BMI: Body mass index is 27.95 kg/m².  BSA Body surface area is 1.78 meters squared.  ECOG Performance Status:   ECOG SCORE    1 - Restricted in strenuous activity-ambulatory and able to carry out work of a light nature      ECOG 1    GENERAL APPEARANCE: Well developed, well nourished, in no acute distress.  SKIN: Inspection of the skin reveals no rashes, ulcerations or petechiae.  HEENT: The sclerae were anicteric and conjunctivae were pink and moist. Extraocular movements were intact and pupils were equal, round, and reactive to light with normal accommodation. External inspection of the ears and nose showed no scars, lesions, or masses. Lips, teeth, and gums showed normal mucosa. The oral mucosa, hard and soft palate, tongue and posterior pharynx were normal.  NECK: Supple and symmetric. There was no thyroid enlargement, and no tenderness, or masses were felt.  CRESPIRATORY: Normal AP diameter and " normal contour without any kyphoscoliosis. LUNGS: Auscultation of the lungs revealed normal breath sounds without any other adventitious sounds or rubs.  CARDIOVASCULAR: There was a regular rate and rhythm without any murmurs, gallops, rubs. The carotid pulses were normal and 2+ bilaterally without bruits. Peripheral pulses were 2+ and symmetric.  GASTROINTESTINAL: Soft and nontender with normal bowel sounds. The liver span was approximately 5-6 cm in the right midclavicular line by percussion. The liver edge was nontender. The spleen was not palpable. There were no inguinal or umbilical hernias noted. No ascites was noted.  LYMPH NODES: No lymphadenopathy was appreciated in the neck, axillae or groin.  MUSCULOSKELETAL: Gait was normal. There was no tenderness or effusions noted. Muscle strength and tone were normal. EXTREMITIES: No cyanosis, clubbing or edema.  NEUROLOGIC: Alert and oriented x 3. Normal affect. Gait was normal. Normal deep tendon reflexes with no pathological reflexes. Sensation to touch was normal.  PSYCHIATRIC: good mood, orientated to place, time and person     Labs and Imagings     Orders Only on 10/14/2019   Component Date Value Ref Range Status    Glucose 10/14/2019 164* 82 - 115 mg/dL Final    BUN, Bld 10/14/2019 15.8  8 - 23 mg/dL Final    Creatinine 10/14/2019 0.74  0.50 - 0.90 mg/dL Final    AST 10/14/2019 11  0 - 32 U/L Final    ALT (SGPT) 10/14/2019 11  0 - 33 U/L Final    Alkaline Phosphatase 10/14/2019 34* 35 - 105 U/L Final    Calcium 10/14/2019 9.9  8.6 - 10.2 mg/dL Final    Protein, Total 10/14/2019 7.1  6.4 - 8.3 g/dL Final    Albumin 10/14/2019 4.3  3.5 - 5.2 g/dL Final    BILIRUBIN, TOTAL 10/14/2019 0.31  0.00 - 1.20 mg/dL Final    Sodium 10/14/2019 133* 136 - 145 mmol/L Final    Potassium 10/14/2019 4.4  3.5 - 5.1 mmol/L Final    Chloride 10/14/2019 97* 98 - 107 mmol/L Final    CO2 10/14/2019 28  22 - 29 mmol/L Final    Globulin 10/14/2019 2.8  1.5 - 4.5 g/dL  Final    Albumin/Globulin Ratio 10/14/2019 1.5  1.0 - 2.7 Final    BUN/Creatinine Ratio 10/14/2019 21.4* 6 - 20 Final    GFR ESTIMATION 10/14/2019 74.24  >60.00 Final    Comment: GFR ESTIMATION IS REPORTED AS ML/MIN/1.73M SQUARED.  IT IS RECOMMENDED  THAT GFR VALUES FOR  BE MULTIPLIED X 1.212.  THE  HIGHER THE GFR, THE BETTER THE KIDNEY FUNCTION.  A GFR OF >60 IS  CONSIDERED NORMAL (DEPENDING ON AGE AND WHETHER THE PATIENT IS A MALE  OR FEMALE.      Anion Gap 10/14/2019 8.0  8.0 - 17.0 mmol/L Final    Comment: NOTE  Testing performed at:  [facility], [address] [place] [address] CLIA #:[id number]      WBC 10/14/2019 4.56  4.3 - 10.8 X 10 3/ul Final    RBC 10/14/2019 4.08* 4.2 - 5.4 X 10 6/ul Final    RDW-SD 10/14/2019 44.4  37 - 54 fl Final    Hemoglobin 10/14/2019 11.9* 12 - 16 g/dL Final    Hematocrit 10/14/2019 35.8* 37 - 47 % Final    MCV 10/14/2019 87.7  82 - 100 fl Final    MCH 10/14/2019 29.2  27 - 32 pg Final    MCHC 10/14/2019 33.2  32 - 36 g/dL Final    Platelets 10/14/2019 229  135 - 400 X 10 3/ul Final    Neutrophils 10/14/2019 39.8  % Final    Lymphocytes 10/14/2019 38.2  19.3 - 53.1 % Final    Monocytes 10/14/2019 15.6* 4.7 - 12.5 % Final    Eosinophils 10/14/2019 5.3  0.7 - 7.0 % Final    Basophils 10/14/2019 0.9  0.2 - 1.2 % Final    Neutrophils Absolute 10/14/2019 1.82* 2.15 - 7.56 X 10 3/ul Final    ABSOLUTE LYMPHOCYTE 10/14/2019 1.74  0.86 - 4.75 X 10 3/ul Final    ABSOLUTE MONOCYTE 10/14/2019 0.71  0.22 - 1.08 X 10 3/ul Final    ABSOLUTE EOSINOPHIL 10/14/2019 0.24  0.04 - 0.54 X 10 3/ul Final    ABSOLUTE BASOPHIL 10/14/2019 0.04  0.00 - 0.22 X 10 3/ul Final    ABSOLUTE IMMATURE GRAN 10/14/2019 0.01  0 - 0.04 X 10 3/ul Final    Immature Granulocytes 10/14/2019 0.2  0 - 0.5 % Final    IG INCLUDES METAMYELOCYTES, MYELOCYTES, AND PROMYELOCYTES    nRBC# 10/14/2019 0.0  0 - 0.2 /100 WBC Final    ABS NRBC COUNT 10/14/2019 0.000  0 -  0.012 x 10 3/ul Final    Comment: NOTE  Testing performed at:  [facility], [address] [place] [address]  [address] CLIA #:[id number]     Orders Only on 10/07/2019   Component Date Value Ref Range Status    Glucose 10/07/2019 186* 82 - 115 mg/dL Final    BUN, Bld 10/07/2019 12.0  8 - 23 mg/dL Final    Creatinine 10/07/2019 0.83  0.50 - 0.90 mg/dL Final    AST 10/07/2019 12  0 - 32 U/L Final    ALT (SGPT) 10/07/2019 10  0 - 33 U/L Final    Alkaline Phosphatase 10/07/2019 37  35 - 105 U/L Final    Calcium 10/07/2019 10.1  8.6 - 10.2 mg/dL Final    Protein, Total 10/07/2019 7.5  6.4 - 8.3 g/dL Final    Albumin 10/07/2019 4.6  3.5 - 5.2 g/dL Final    BILIRUBIN, TOTAL 10/07/2019 0.24  0.00 - 1.20 mg/dL Final    Sodium 10/07/2019 130* 136 - 145 mmol/L Final    Potassium 10/07/2019 4.6  3.5 - 5.1 mmol/L Final    Chloride 10/07/2019 92* 98 - 107 mmol/L Final    CO2 10/07/2019 28  22 - 29 mmol/L Final    Globulin 10/07/2019 2.9  1.5 - 4.5 g/dL Final    Albumin/Globulin Ratio 10/07/2019 1.6  1.0 - 2.7 Final    BUN/Creatinine Ratio 10/07/2019 14.5  6 - 20 Final    GFR ESTIMATION 10/07/2019 65.03  >60.00 Final    Comment: GFR ESTIMATION IS REPORTED AS ML/MIN/1.73M SQUARED.  IT IS RECOMMENDED  THAT GFR VALUES FOR  BE MULTIPLIED X 1.212.  THE  HIGHER THE GFR, THE BETTER THE KIDNEY FUNCTION.  A GFR OF >60 IS  CONSIDERED NORMAL (DEPENDING ON AGE AND WHETHER THE PATIENT IS A MALE  OR FEMALE.      Anion Gap 10/07/2019 10.0  8.0 - 17.0 mmol/L Final    Comment: NOTE  Testing performed at:  [facility], [address] [place] [address]  [address] CLIA #:[id number]      WBC 10/07/2019 4.90  4.3 - 10.8 X 10 3/ul Final    RBC 10/07/2019 4.39  4.2 - 5.4 X 10 6/ul Final    RDW-SD 10/07/2019 41.9  37 - 54 fl Final    Hemoglobin 10/07/2019 12.9  12 - 16 g/dL Final    Hematocrit 10/07/2019 38.1  37 - 47 % Final    MCV 10/07/2019 86.8  82 - 100 fl Final    MCH 10/07/2019 29.4  27 - 32  pg Final    MCHC 10/07/2019 33.9  32 - 36 g/dL Final    Platelets 10/07/2019 239  135 - 400 X 10 3/ul Final    Neutrophils 10/07/2019 33.5  % Final    Lymphocytes 10/07/2019 45.7  19.3 - 53.1 % Final    Monocytes 10/07/2019 16.3* 4.7 - 12.5 % Final    Eosinophils 10/07/2019 3.7  0.7 - 7.0 % Final    Basophils 10/07/2019 0.6  0.2 - 1.2 % Final    Neutrophils Absolute 10/07/2019 1.64* 2.15 - 7.56 X 10 3/ul Final    ABSOLUTE LYMPHOCYTE 10/07/2019 2.24  0.86 - 4.75 X 10 3/ul Final    ABSOLUTE MONOCYTE 10/07/2019 0.80  0.22 - 1.08 X 10 3/ul Final    ABSOLUTE EOSINOPHIL 10/07/2019 0.18  0.04 - 0.54 X 10 3/ul Final    ABSOLUTE BASOPHIL 10/07/2019 0.03  0.00 - 0.22 X 10 3/ul Final    ABSOLUTE IMMATURE GRAN 10/07/2019 0.01  0 - 0.04 X 10 3/ul Final    Immature Granulocytes 10/07/2019 0.2  0 - 0.5 % Final    IG INCLUDES METAMYELOCYTES, MYELOCYTES, AND PROMYELOCYTES    nRBC# 10/07/2019 0.0  0 - 0.2 /100 WBC Final    ABS NRBC COUNT 10/07/2019 0.000  0 - 0.012 x 10 3/ul Final    Comment: NOTE  Testing performed at:  [facility], [address] [place] [address]  [address] CLIA #:[id number]       All cell laboratory reviewed which shows white cell 1.7 and sodium 126  Imaging:     Assessment:     Principle Problem: Malignant 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 [C67.3]   Co-morbidity/Active Problems:   Patient Active Problem List   Diagnosis    Malignant neoplasm of urinary bladder    Essential hypertension    Type 2 diabetes mellitus without complication, without long-term current use of insulin    Encounter for antineoplastic chemotherapy    Chemotherapy-induced nausea    Chemotherapy-induced neutropenia    Absolute anemia        Sherice Haji is a 87 y.o. 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 Diagnoses of Type 2 diabetes mellitus without complication, without long-term current use of insulin and Essential  hypertension were also pertinent to this visit., with Malignant 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 [C67.3]   Sherice Haji is a 87 y.o. 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 [C67.3]   87-year-old female otherwise healthy except hypertension diabetes present with bladder mass biopsy-proven muscle invasive bladder cancer( stage II) and     biopsy-proven muscle invasive bladder cancer( stage II)   Normal renal function  Neutropenia  On chemo radiation therapy    Plan:   Overall Plan:  Concurrent chemoradiation therapy, followed by optional cysctemy and full dose chemo C/G    --complete chemoradiation  --the overall plan is to concurrent chemoradiation therapy and bladder preservation.  If there is residual disease after completion of chemoradiation therapy the option will be cystectomy versus second-line immunotherapy to induce a local response in the bladder.  In easch case, she need full dose chemo C/G  After completion of local therapy.  for concerning more treatment; if we decide to give her chemotherapy I will use 75% of full dose given her advanced age    Plan plan to start split dose GC but she declined it. Wants to wait for 3 months    ==Labs CBC CMP 3 mon  ==Repeat Urology and consider repeat cystoscope to evaluate response Adalid Valera MD [facility] [place] [id number] [phone]  ==RTC 3 Months    Signature    Misael Gordon M.D., Ph.D., [facility]  Hematology-Oncology    Signed 4/16/2020 9:47 AM   Note Ends Here  See

## 2020-07-06 ENCOUNTER — PROCEDURE VISIT (OUTPATIENT)
Dept: UROLOGY | Facility: CLINIC | Age: 85
End: 2020-07-06
Payer: COMMERCIAL

## 2020-07-06 VITALS
WEIGHT: 153 LBS | DIASTOLIC BLOOD PRESSURE: 75 MMHG | OXYGEN SATURATION: 97 % | RESPIRATION RATE: 18 BRPM | HEART RATE: 68 BPM | BODY MASS INDEX: 27.11 KG/M2 | SYSTOLIC BLOOD PRESSURE: 188 MMHG | HEIGHT: 63 IN

## 2020-07-06 DIAGNOSIS — C67.9 MALIGNANT NEOPLASM OF URINARY BLADDER, UNSPECIFIED SITE: Primary | ICD-10-CM

## 2020-07-06 LAB
BILIRUB UR QL STRIP: NEGATIVE
CLARITY, POC UA: NORMAL
COLOR, POC UA: NORMAL
GLUCOSE UR QL STRIP: NEGATIVE
KETONES UR QL STRIP: NEGATIVE
LEUKOCYTE ESTERASE UR QL STRIP: NEGATIVE
NITRITE, POC UA: NORMAL
PH, POC UA: 7
POC AMORP, URINE: NORMAL
POC BACTI, URINE: NORMAL
POC BLOOD, URINE: NEGATIVE
POC CASTS, URINE: NORMAL
POC CRYST, URINE: NORMAL
POC EPITH, URINE: NORMAL
POC HCG, URINE: NORMAL
POC HYALIN, URINE: 0 LPF
POC MUCUS, URINE: NORMAL
POC NITRATES, URINE: NEGATIVE
POC OTHER, URINE: NORMAL
POC RBC, URINE: NORMAL
POC WBC, URINE: NORMAL
PROT UR QL STRIP: NEGATIVE
SP GR UR STRIP: 1.01 (ref 1–1.03)
UROBILINOGEN UR STRIP-ACNC: 1 (ref 0.1–1.1)

## 2020-07-06 PROCEDURE — 52000 CYSTOURETHROSCOPY: CPT | Mod: S$GLB,,, | Performed by: SPECIALIST

## 2020-07-06 PROCEDURE — 52000 PR CYSTOURETHROSCOPY: ICD-10-PCS | Mod: S$GLB,,, | Performed by: SPECIALIST

## 2020-07-06 RX ORDER — CIPROFLOXACIN 500 MG/1
500 TABLET ORAL
Status: COMPLETED | OUTPATIENT
Start: 2020-07-06 | End: 2020-07-06

## 2020-07-06 RX ADMIN — CIPROFLOXACIN 500 MG: 500 TABLET ORAL at 10:07

## 2020-07-06 NOTE — PROCEDURES
"Cystoscopy    Date/Time: 7/6/2020 10:20 AM  Performed by: Adalid Valera MD  Authorized by: Adalid Valera MD     Consent Done?:  Yes (Written)  Time out: Immediately prior to procedure a "time out" was called to verify the correct patient, procedure, equipment, support staff and site/side marked as required.    Indications: history bladder cancer    Position:  Dorsal lithotomy  Anesthesia:  Intraurethral instillation  Preparation: Patient was prepped and draped in usual sterile fashion      Scope type:  Rigid cystoscope  External exam normal: Yes    Urethra normal: Yes  Bladder neck normal: Bladder neck normal   Bladder normal: No         Number of tumors:  1       Tumor 1:          Size (mm):  20         Anatomy:  Sessile         Location:  L [place] Wall    Patient tolerance:  Patient tolerated the procedure well with no immediate complications       Cystoscopy today showed satellite revascularization in the area of previously resected tumor.  There was a sesile lesion in the area of previous resection that will need to be biopsied.    87-year-old  female very strong for her age who was diagnosed with muscle invasive bladder cancer after we met her in consultation in the hospital when she presented with gross hematuria.  Following this diagnosis treatment options were presented to her.  They elected to for chemoradiation as an option appropriate for age.  The family understood that this was not the standard of care. She completed chemotherapy December 5, 2019 and completed radiotherapy December 17th thing 2019.  She is here today for interim followup.    She underwent cystoscopy in April of 2020.  It was completely negative.  She is here today for repeat cystoscopy with the findings as above.    Plan will be for her to proceed to the operating room for biopsy of the suspicious area.  Will plan to do that sometime next week.          "

## 2020-07-06 NOTE — PATIENT INSTRUCTIONS
Cystoscopy    Cystoscopy is a procedure that lets your doctor look directly inside your urethra and bladder. It can be used to:  · Help diagnose a problem with your urethra, bladder, or kidneys.  · Take a sample (biopsy) of bladder or urethral tissue.  · Treat certain problems (such as removing kidney stones).  · Place a stent to bypass an obstruction.  · Take special X-rays of the kidneys.  Based on the findings, your doctor may recommend other tests or treatments.  What is a cystoscope?  A cystoscope is a telescope-like instrument that contains lenses and fiberoptics (small glass wires that make bright light). The cystoscope may be straight and rigid, or flexible to bend around curves in the urethra. The doctor may look directly into the cystoscope, or project the image onto a monitor.  Getting ready  · Ask your doctor if you should stop taking any medicines before the procedure.  · Ask whether you should avoid eating or drinking anything after midnight before the procedure.  · Follow any other instructions your doctor gives you.  Tell your doctor before the exam if you:  · Take any medicines, such as aspirin or blood thinners  · Have allergies to any medicines  · Are pregnant   The procedure  Cystoscopy is done in the doctors office, surgery center, or hospital. The doctor and a nurse are present during the procedure. It takes only a few minutes, longer if a biopsy, X-ray, or treatment needs to be done.  During the procedure:  · You lie on an exam table on your back, knees bent and legs apart. You are covered with a drape.  · Your urethra and the area around it are washed. Anesthetic jelly may be applied to numb the urethra. Other pain medicine is usually not needed. In some cases, you may be offered a mild sedative to help you relax. If a more extensive procedure is to be done, such as a biopsy or kidney stone removal, general anesthesia may be needed.  · The cystoscope is inserted. A sterile fluid is put  into the bladder to expand it. You may feel pressure from this fluid.  · When the procedure is done, the cystoscope is removed.  After the procedure  If you had a sedative, general anesthesia, or spinal anesthesia, you must have someone drive you home. Once youre home:  · Drink plenty of fluids.  · You may have burning or light bleeding when you urinate--this is normal.  · Medicines may be prescribed to ease any discomfort or prevent infection. Take these as directed.  · Call your doctor if you have heavy bleeding or blood clots, burning that lasts more than a day, a fever over 100°F  (38° C), or trouble urinating.  Date Last Reviewed: 1/1/2017  © 3234-3501 [employer] [address] [place] [address], [place] [address]. All rights reserved. This information is not intended as a substitute for professional medical care. Always follow your healthcare professional's instructions.

## 2020-07-07 ENCOUNTER — TELEPHONE (OUTPATIENT)
Dept: UROLOGY | Facility: CLINIC | Age: 85
End: 2020-07-07

## 2020-07-09 ENCOUNTER — TELEPHONE (OUTPATIENT)
Dept: UROLOGY | Facility: CLINIC | Age: 85
End: 2020-07-09

## 2020-07-09 NOTE — TELEPHONE ENCOUNTER
Attempted to contact Haritha palencia/ Dr Castillo's office. Left voicemail to return call.    ABW    ----- Message from Alycia Orosco sent at 7/9/2020  8:36 AM CDT -----  haritha palencia/ dr castillo will need all of patients  preop results faxed to her at [phone]//ph:[phone]

## 2020-07-10 ENCOUNTER — TELEPHONE (OUTPATIENT)
Dept: UROLOGY | Facility: CLINIC | Age: 85
End: 2020-07-10

## 2020-07-10 NOTE — TELEPHONE ENCOUNTER
Attempted to contact office. Office closed @ 1130a.     ABW    ----- Message from Becky Broussard sent at 7/9/2020  1:13 PM CDT -----  Regarding: Pt Advice  Contact: Alessandra /Dr Sejal Castillo  States that she is calling to speak to Tobi. States that she needs to know if pt is having her pre op testing done on the 07/15/20 with Dr Valera. Please call back at [phone] option 2 leave message if no answer//Thank you acc

## 2020-07-15 ENCOUNTER — CLINICAL SUPPORT (OUTPATIENT)
Dept: UROLOGY | Facility: CLINIC | Age: 85
End: 2020-07-15
Payer: COMMERCIAL

## 2020-07-15 DIAGNOSIS — C67.9 MALIGNANT NEOPLASM OF URINARY BLADDER, UNSPECIFIED SITE: Primary | ICD-10-CM

## 2020-07-15 LAB
ANION GAP SERPL CALC-SCNC: 6 MMOL/L (ref 3–11)
APPEARANCE, UA: CLEAR
BACTERIA SPEC CULT: ABNORMAL /HPF
BILIRUB UR QL STRIP: NEGATIVE MG/DL
BUN SERPL-MCNC: 16 MG/DL (ref 7–18)
BUN/CREAT SERPL: 14.95 RATIO (ref 7–18)
CALCIUM BLD-MCNC: NEGATIVE MG/DL
CALCIUM SERPL-MCNC: 9.6 MG/DL (ref 8.8–10.5)
CELLS COUNTED: 100
CHLORIDE SERPL-SCNC: 97 MMOL/L (ref 100–108)
CO2 SERPL-SCNC: 33 MMOL/L (ref 21–32)
COLOR UR: ABNORMAL
COVID-19 AB, IGM: NEGATIVE
CREAT SERPL-MCNC: 1.07 MG/DL (ref 0.55–1.02)
EOSINOPHIL NFR BLD: 2 % (ref 1–3)
ERYTHROCYTE [DISTWIDTH] IN BLOOD BY AUTOMATED COUNT: 13.9 % (ref 12.5–18)
GFR ESTIMATION: 59
GLUCOSE (UA): NORMAL MG/DL
GLUCOSE SERPL-MCNC: 204 MG/DL (ref 70–110)
HCT VFR BLD AUTO: 35.5 % (ref 37–47)
HGB BLD-MCNC: 11.5 G/DL (ref 12–16)
HGB UR QL STRIP: 10 /UL
KETONES UR QL STRIP: NEGATIVE MG/DL
LEUKOCYTE ESTERASE UR QL STRIP: 25 /UL
LYMPHOCYTES NFR BLD: 23 % (ref 25–40)
MANUAL NRBC PER 100 CELLS: 0 %
MCH RBC QN AUTO: 28.8 PG (ref 27–31.2)
MCHC RBC AUTO-ENTMCNC: 32.4 G/DL (ref 31.8–35.4)
MCV RBC AUTO: 88.8 FL (ref 80–97)
MONOCYTES NFR BLD MANUAL: 6 % (ref 1–15)
NEUTROPHILS ABSOLUTE COUNT: 2.1 10*3/UL (ref 1.8–7.7)
NEUTROPHILS NFR BLD: 69 % (ref 37–80)
NITRITE UR QL STRIP: NEGATIVE
PH UR STRIP: 6.5 PH (ref 5–9)
PLATELETS: 176 10*3/UL (ref 142–424)
POTASSIUM SERPL-SCNC: 4.2 MMOL/L (ref 3.6–5.2)
PROT UR QL STRIP: NEGATIVE MG/DL
RBC # BLD AUTO: 4 10*6/UL (ref 4.2–5.4)
RBC #/AREA URNS HPF: ABNORMAL /HPF (ref 0–2)
RBC MORPH BLD: ABNORMAL
SERVICE COMMENT 03: ABNORMAL
SMALL PLATELETS BLD QL SMEAR: ADEQUATE
SODIUM BLD-SCNC: 136 MMOL/L (ref 135–145)
SP GR UR STRIP: 1.01 (ref 1–1.03)
SPECIMEN COLLECTION METHOD, URINE: ABNORMAL
SQUAMOUS EPITHELIAL, UA: ABNORMAL /LPF
UROBILINOGEN UR STRIP-ACNC: NORMAL MG/DL
WBC # BLD: 3.1 10*3/UL (ref 4.6–10.2)
WBC #/AREA URNS HPF: ABNORMAL /HPF (ref 0–5)

## 2020-07-22 ENCOUNTER — OUTSIDE PLACE OF SERVICE (OUTPATIENT)
Dept: UROLOGY | Facility: CLINIC | Age: 85
End: 2020-07-22
Payer: COMMERCIAL

## 2020-07-22 LAB
GLUCOSE SERPL-MCNC: 130 MG/DL (ref 70–105)
GLUCOSE SERPL-MCNC: 139 MG/DL (ref 70–105)

## 2020-07-22 PROCEDURE — 52235 CYSTOSCOPY AND TREATMENT: CPT | Mod: ,,, | Performed by: SPECIALIST

## 2020-07-22 PROCEDURE — 52235 PR CYSTOURETHROSCOPY,FULGUR 2-5 CM LESN: ICD-10-PCS | Mod: ,,, | Performed by: SPECIALIST

## 2020-07-27 ENCOUNTER — CLINICAL SUPPORT (OUTPATIENT)
Dept: UROLOGY | Facility: CLINIC | Age: 85
End: 2020-07-27
Payer: COMMERCIAL

## 2020-07-27 NOTE — PROGRESS NOTES
Pt in clinic today for nurse visit to have catheter bag checked, pt states that her bag was leaking, nurse changed catheter bag and provided pt with education on changing catheter bag, sent extra catheter bag home with pt. NB

## 2020-07-29 ENCOUNTER — TELEPHONE (OUTPATIENT)
Dept: UROLOGY | Facility: CLINIC | Age: 85
End: 2020-07-29

## 2020-07-29 NOTE — TELEPHONE ENCOUNTER
Late entry: pt in clinic for catheter bag leakage and denies any COVID symptoms at this time since procedure. NB

## 2020-07-30 ENCOUNTER — OFFICE VISIT (OUTPATIENT)
Dept: UROLOGY | Facility: CLINIC | Age: 85
End: 2020-07-30
Payer: COMMERCIAL

## 2020-07-30 VITALS
DIASTOLIC BLOOD PRESSURE: 82 MMHG | WEIGHT: 153 LBS | HEIGHT: 63 IN | HEART RATE: 95 BPM | RESPIRATION RATE: 18 BRPM | SYSTOLIC BLOOD PRESSURE: 130 MMHG | BODY MASS INDEX: 27.11 KG/M2

## 2020-07-30 DIAGNOSIS — C67.9 MALIGNANT NEOPLASM OF URINARY BLADDER, UNSPECIFIED SITE: Primary | ICD-10-CM

## 2020-07-30 PROCEDURE — 99024 POSTOP FOLLOW-UP VISIT: CPT | Mod: S$GLB,,, | Performed by: SPECIALIST

## 2020-07-30 PROCEDURE — 99024 PR POST-OP FOLLOW-UP VISIT: ICD-10-PCS | Mod: S$GLB,,, | Performed by: SPECIALIST

## 2020-07-30 RX ORDER — LEVOTHYROXINE SODIUM 75 UG/1
TABLET ORAL
COMMUNITY
Start: 2020-06-25

## 2020-07-30 RX ORDER — HYOSCYAMINE SULFATE 0.12 MG/1
TABLET SUBLINGUAL
COMMUNITY
Start: 2020-07-22 | End: 2022-04-05

## 2020-07-30 RX ORDER — MECLIZINE HCL 12.5 MG 12.5 MG/1
TABLET ORAL
COMMUNITY
Start: 2020-06-16 | End: 2022-04-05

## 2020-07-30 RX ORDER — TRAMADOL HYDROCHLORIDE 50 MG/1
TABLET ORAL
COMMUNITY
Start: 2020-07-22 | End: 2022-02-01

## 2020-07-30 NOTE — PROGRESS NOTES
Chief Complaint:   Chief Complaint   Patient presents with    Follow-up     f/u, cath removal       HPI:  88-year-old  female known to the service of Dr. Valera who presents for post op follow up.  She was diagnosed with muscle invasive bladder cancer after urology was consulted when she was in the hospital for gross hematuria.  She is very strong for her age, was given treatment options after diagnosis and elected for chemo radiation.    She completed chemotherapy on December 5, 2019 and completed radiotherapy on December 17, 2019.  She went for follow-up cystoscopy and there was evidence of recurrent disease.  She underwent trans urethral resection of bladder tumor on July 22, 2020.  She presents today for Escobar catheter removal. She denies any hematuria.     Pathology reveals high-grade urothelial carcinoma, invading into smooth muscle, tumor size 5.0 x 2.5 x 0.5 cm.    She denies any new urological complaints.    Allergies:  Aspirin    Medications: has a current medication list which includes the following prescription(s): accu-chek fastclix lancet drum, accu-chek smartview contrl sol, accu-chek smartview test strip, bd alcohol swabs, benzonatate, diphenoxylate-atropine 2.5-0.025 mg, droplet pen needle, glimepiride, hydrocodone-acetaminophen, hyoscyamine, indapamide, lantus solostar u-100 insulin, levothyroxine, loratadine, losartan, meclizine, meloxicam, metformin, metoprolol tartrate, ondansetron, simvastatin, tramadol, and trazodone.    Review of Systems:  General: No fever, chills, vision changes, dizziness, weakness, fatigue, unexplained weight loss, confusion, or mood swings.  Skin: No rashes, itching, or changes in color/texture of skin.  Chest: Denies ROMERO, cyanosis, wheezing, cough, and sputum production.  Abdomen: Appetite fine. Denies diarrhea, abdominal pain, hematemesis, or blood in stool.  Musculoskeletal: No joint stiffness or swelling. No painful lymph nodes  : As above.  All  other review of systems negative.    PMH:   has a past medical history of Absolute anemia (12/5/2019), Allergy, Anxiety, Arthritis, Bladder cancer, Cataract, Depression, Diabetes mellitus, Hypertension, Malignant 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 (9/25/2019), and Type 2 diabetes mellitus without complication, without long-term current use of insulin (9/25/2019).    PSH:   has a past surgical history that includes Cataract extraction, bilateral (05/2019) and Bladder tumor excision (09/2019).    FamHx: family history includes Breast cancer in her sister and sister; No Known Problems in her father, maternal aunt, maternal grandfather, maternal grandmother, maternal uncle, mother, paternal aunt, paternal grandfather, paternal grandmother, and paternal uncle; Prostate cancer in her brother and brother.    SocHx:  reports that she has never smoked. She has never used smokeless tobacco. She reports previous alcohol use. She reports that she does not use drugs.      Physical Exam:  Vitals:    07/30/20 1425   BP: 130/82   Pulse: 95   Resp: 18     General: AAOx3, no apparent distress, no deformities  Neck: supple, no masses, normal thyroid, full ROM  Lungs: CTAB, no adventitious breath sounds, normal inspiration, no use of accessory muscles  Heart: regular rate and rhythm, no arrhythmias  Abdomen: soft, NT, ND, no masses, no hernias, no hepatosplenomegaly  Lymphatic: no unusually enlarged or tender lymph nodes  Skin: warm and dry, no jaundice, no rash  Ext: without edema or deformity, CORCORAN, ambulates independently  : Escobar catheter in place draining clear yellow urine    Labs/Studies: path results as above    Impression/Plan:   Malignant neoplasm of urinary bladder, unspecified site  -     Nursing communication        Follow up in about 6 weeks (around 9/10/2020).

## 2020-07-30 NOTE — PROGRESS NOTES
Patient seen and examined.  Clinical data reviewed.  Case discussed with the nurse practitioner.  I agree with her assessment and plan.    Briefly this is an 88-year-old female muscle invasive bladder cancer status post chemoradiation.  Recent cystoscopy in office for surveillance showed evidence of disease recurrence.  She was taken by for TURBT last week and she is here today to review the pathology.  Pathology confirms bladder tumor high-grade urothelial carcinoma invading to the smooth muscle.  The plan today will be to refer her back to her medical oncologist Dr. Gordon.  We should also removed her Escobar catheter today.  We will keep her in the system for about 6-8 weeks

## 2020-08-13 ENCOUNTER — OFFICE VISIT (OUTPATIENT)
Dept: HEMATOLOGY/ONCOLOGY | Facility: CLINIC | Age: 85
End: 2020-08-13
Payer: COMMERCIAL

## 2020-08-13 VITALS
OXYGEN SATURATION: 96 % | TEMPERATURE: 97 F | HEIGHT: 63 IN | RESPIRATION RATE: 16 BRPM | DIASTOLIC BLOOD PRESSURE: 84 MMHG | HEART RATE: 85 BPM | SYSTOLIC BLOOD PRESSURE: 176 MMHG | WEIGHT: 153 LBS | BODY MASS INDEX: 27.11 KG/M2

## 2020-08-13 DIAGNOSIS — C67.3 MALIGNANT NEOPLASM OF ANTERIOR WALL OF URINARY BLADDER: Primary | ICD-10-CM

## 2020-08-13 PROCEDURE — 1159F PR MEDICATION LIST DOCUMENTED IN MEDICAL RECORD: ICD-10-PCS | Mod: S$GLB,,, | Performed by: INTERNAL MEDICINE

## 2020-08-13 PROCEDURE — 99215 PR OFFICE/OUTPT VISIT, EST, LEVL V, 40-54 MIN: ICD-10-PCS | Mod: S$GLB,,, | Performed by: INTERNAL MEDICINE

## 2020-08-13 PROCEDURE — 1101F PR PT FALLS ASSESS DOC 0-1 FALLS W/OUT INJ PAST YR: ICD-10-PCS | Mod: CPTII,S$GLB,, | Performed by: INTERNAL MEDICINE

## 2020-08-13 PROCEDURE — 1159F MED LIST DOCD IN RCRD: CPT | Mod: S$GLB,,, | Performed by: INTERNAL MEDICINE

## 2020-08-13 PROCEDURE — 1101F PT FALLS ASSESS-DOCD LE1/YR: CPT | Mod: CPTII,S$GLB,, | Performed by: INTERNAL MEDICINE

## 2020-08-13 PROCEDURE — 99215 OFFICE O/P EST HI 40 MIN: CPT | Mod: S$GLB,,, | Performed by: INTERNAL MEDICINE

## 2020-08-13 NOTE — PROGRESS NOTES
Hematology Oncology Progress Note    Hematology Oncology  [facility]     PATIENT: Sherice Haji  : 1932 88 y.o. female  MRN [id number]     PCP: Sejal Castillo MD  Consult Requested By:  No att. 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  After long discussion with the patient's, it is CT that showed her concern is the duration of the treatment session; she may consider immunotherapy because it only lasts 30 min.  Oncology History:    Sherice Haji is here for to followup for bladder cancer.  Current therapy patient doing well without new complaints she specifically denies hematuria or weight loss she has a good appetite.  This is a 87 y.o. female patient with a history of The primary encounter diagnosis was Malignant 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. Diagnoses of Essential hypertension and Type 2 diabetes mellitus without complication, without long-term current use of insulin were also pertinent to this visit., who is referred here for evaluation treatment of bladder cancer.  The patient has been feeling weak and for T for about 2 months.  Urinalysis shows hematuria or and she was referred to Urology.  CT scan with IV contrast demonstrates a bladder mass. Biopsy shows muscle invasive bladder cancer.     The patient lost about 10 lb over the past 2 months.  She denies any abdominal pain chest pain short of breath.     ==2019 completed concurrent chemoradiation therapy as a bladder preservation  ==2020 Cyst NEO in bladder  ==2020 recurrence  muscle invasive bladder cancer on cystoscope       Oncology History   Malignant neoplasm of urinary bladder   9/25/2019 Initial Diagnosis    Malignant 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     10/21/2019 Cancer Staged    Staging form: Urinary Bladder, AJCC 8th Edition  - Clinical: Stage II (cT2, cN0, cM0)     10/31/2019 - 12/11/2019 Chemotherapy    Treatment Summary   Plan Name: cisplatin taxol weekly  Treatment Goal: Curative  Status: Inactive  Start Date: 10/31/2019  End Date: 12/5/2019  Provider: Hoda Josue NP  Chemotherapy: CISplatin (PLATINOL) 20 mg/m2 = 34 mg in sodium chloride 0.9% 500 mL chemo infusion, 20 mg/m2 = 34 mg (100 % of original dose 20 mg/m2), Intravenous, Clinic/HOD 1 time, 6 of 6 cycles  Dose modification: 20 mg/m2 (original dose 20 mg/m2, Cycle 1)  PACLitaxel (TAXOL) 50 mg/m2 = 84 mg in sodium chloride 0.9% 500 mL chemo infusion, 50 mg/m2 = 84 mg (100 % of original dose 50 mg/m2), Intravenous, Clinic/HOD 1 time, 6 of 6 cycles  Dose modification: 50 mg/m2 (original dose 50 mg/m2, Cycle 1)     6/24/2020 - 6/24/2020 Chemotherapy    Treatment Summary   Plan Name: OP BLADDER GEMCITABINE CISPLATIN (SPLIT DOSE CISPLATIN) Q3W  Treatment Goal: Curative  Status: Inactive  Start Date:   End Date:   Provider: Misael Gordon MD  Chemotherapy: CISplatin (PLATINOL) 35 mg/m2 = 62 mg in sodium chloride 0.9% 500 mL chemo infusion, 35 mg/m2, Intravenous, Clinic/HOD 1 time, 0 of 4 cycles  gemcitabine (GEMZAR) 1,780 mg in sodium chloride 0.9% 250 mL chemo infusion, 1,000 mg/m2, Intravenous, Clinic/HOD 1 time, 0 of 4 cycles     8/20/2020 -  Chemotherapy    Treatment Summary   Plan Name: OP PEMBROLIZUMAB 200MG Q3W  Treatment Goal: Control  Status: Active  Start Date: 8/20/2020 (Planned)  End Date: 12/3/2020 (Planned)  Provider: Misael Gordon MD  Chemotherapy: pembrolizumab (KEYTRUDA) 200 mg in sodium chloride 0.9% 100 mL chemo infusion, 200 mg, Intravenous, Clinic/HOD 1 time, 0 of 6 cycles         Oncologic  "History:      Oncologic History     Oncologic Treatment     Pathology      Past Medical History:   Past Medical History:   Diagnosis Date    Absolute anemia 12/5/2019    Allergy     Anxiety     Arthritis     Bladder cancer     Cataract     Removed in May 2019    Depression     Diabetes mellitus     Hypertension     Malignant 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 9/25/2019    Type 2 diabetes mellitus without complication, without long-term current use of insulin 9/25/2019       Past Surgical HIstory:   Past Surgical History:   Procedure Laterality Date    BLADDER TUMOR EXCISION  09/2019    CATARACT EXTRACTION, BILATERAL  05/2019       Allergies:  Review of patient's allergies indicates:   Allergen Reactions    Aspirin        Medications:  Current Outpatient Medications   Medication Sig Dispense Refill    ACCU-CHEK FASTCLIX LANCET DRUM Misc       ACCU-CHEK SMARTVIEW CONTRL SOL Soln       ACCU-CHEK SMARTVIEW TEST STRIP Strp       BD ALCOHOL SWABS PadM       benzonatate (TESSALON) 200 MG capsule       diphenoxylate-atropine 2.5-0.025 mg (LOMOTIL) 2.5-0.025 mg per tablet       DROPLET PEN NEEDLE 32 gauge x 5/32" Ndle       glimepiride (AMARYL) 2 MG tablet       HYDROcodone-acetaminophen (NORCO) 5-325 mg per tablet       hyoscyamine (LEVSIN/SL) 0.125 mg Subl       indapamide (LOZOL) 2.5 MG Tab       LANTUS SOLOSTAR U-100 INSULIN glargine 100 units/mL (3mL) SubQ pen       levothyroxine (SYNTHROID) 75 MCG tablet       loratadine (CLARITIN) 10 mg tablet       losartan (COZAAR) 25 MG tablet       meclizine (ANTIVERT) 12.5 mg tablet       meloxicam (MOBIC) 7.5 MG tablet       metFORMIN (GLUCOPHAGE-XR) 500 MG 24 hr tablet       metoprolol tartrate (LOPRESSOR) 50 MG tablet       ondansetron (ZOFRAN) 8 MG tablet Take 1 tablet (8 mg total) by mouth every 12 (twelve) hours as needed for Nausea. 30 tablet 2    simvastatin (ZOCOR) 20 MG tablet       traMADoL (ULTRAM) 50 mg tablet       " traZODone (DESYREL) 50 MG tablet        No current facility-administered medications for this visit.        Family History:   Family History   Problem Relation Age of Onset    No Known Problems Mother     No Known Problems Father     Breast cancer Sister     Prostate cancer Brother     No Known Problems Maternal Aunt     No Known Problems Maternal Uncle     No Known Problems Paternal Aunt     No Known Problems Paternal Uncle     No Known Problems Maternal Grandmother     No Known Problems Maternal Grandfather     No Known Problems Paternal Grandmother     No Known Problems Paternal Grandfather     Breast cancer Sister     Prostate cancer Brother        Social History:  reports that she has never smoked. She has never used smokeless tobacco. She reports previous alcohol use. She reports that she does not use drugs.    Review of Systemas  Constitutional: No change in weight, appetie, fatigue, fever, or night sweats  Eyes: No changes in vision  Ears, Nose, Mouth, Throat, and Face: No hearing problems, ear pain, rummy nose, or sore throat  Respiratory: No shortness of breath or cough  Cardiovascular: No chest pain, palpitations or dizziness  Gastrointestinal: No abdominal pain, hematochezia, melena  Genitourinary: No dysuria, hematuria, nocturia, menstrual problems, post menopausal  Integumentary/Breast: No rashes or itching  Hematologic/Lymphatic: No anemia or bleeding abnormalities  Musculoskeletal: No joint or muscle abnormalities  Neurological: No sensory or motor problems, no headaches  Behavioral/Psych: No depression, anxiety, cognitive problems, or stress  Endocrine: No diabetes or thyroid problems  Allergic/Immunologic: See allergy above    Physical Exam      Vitals:   Vitals:    08/13/20 0940   BP: (!) 176/84   BP Location: Left arm   Patient Position: Sitting   BP Method: Medium (Automatic)   Pulse: 85   Resp: 16   Temp: 97 °F (36.1 °C)   TempSrc: Temporal   SpO2: 96%   Weight: 69.4 kg (153 lb)  "  Height: 5' 3" (1.6 m)     BMI: Body mass index is 27.1 kg/m².  BSA Body surface area is 1.76 meters squared.  ECOG Performance Status:   ECOG SCORE    1 - Restricted in strenuous activity-ambulatory and able to carry out work of a light nature      ECOG 1    GENERAL APPEARANCE: Well developed, well nourished, in no acute distress.  SKIN: Inspection of the skin reveals no rashes, ulcerations or petechiae.  HEENT: The sclerae were anicteric and conjunctivae were pink and moist. Extraocular movements were intact and pupils were equal, round, and reactive to light with normal accommodation. External inspection of the ears and nose showed no scars, lesions, or masses. Lips, teeth, and gums showed normal mucosa. The oral mucosa, hard and soft palate, tongue and posterior pharynx were normal.  NECK: Supple and symmetric. There was no thyroid enlargement, and no tenderness, or masses were felt.  CRESPIRATORY: Normal AP diameter and normal contour without any kyphoscoliosis. LUNGS: Auscultation of the lungs revealed normal breath sounds without any other adventitious sounds or rubs.  CARDIOVASCULAR: There was a regular rate and rhythm without any murmurs, gallops, rubs. The carotid pulses were normal and 2+ bilaterally without bruits. Peripheral pulses were 2+ and symmetric.  GASTROINTESTINAL: Soft and nontender with normal bowel sounds. The liver span was approximately 5-6 cm in the right midclavicular line by percussion. The liver edge was nontender. The spleen was not palpable. There were no inguinal or umbilical hernias noted. No ascites was noted.  LYMPH NODES: No lymphadenopathy was appreciated in the neck, axillae or groin.  MUSCULOSKELETAL: Gait was normal. There was no tenderness or effusions noted. Muscle strength and tone were normal. EXTREMITIES: No cyanosis, clubbing or edema.  NEUROLOGIC: Alert and oriented x 3. Normal affect. Gait was normal. Normal deep tendon reflexes with no pathological reflexes. " Sensation to touch was normal.  PSYCHIATRIC: good mood, orientated to place, time and person     Labs and Imagings     Orders Only on 2020   Component Date Value Ref Range Status    POC Glucose 2020 130* 70 - 105 Final    POC Test Performed   -  Diabetic Fastin-180 mg/dl   Orders Only on 2020   Component Date Value Ref Range Status    POC Glucose 2020 139* 70 - 105 Final    POC Test Performed   -  Diabetic Fastin-180 mg/dl   Orders Only on 07/15/2020   Component Date Value Ref Range Status    COVID-19 Ab, IgM 07/15/2020 Negative  Negative Final    IgM usually appears within 3-5 days of symptoms.  Negativeresults do not rule out SARS-CoV-2 infection, particularlyin those who have been in contact with the virus.    Antibody SARS CoV-2 07/15/2020 Negative  Negative Final    Comment: IgG usually appears approximately 10 days after symptomsThis test has been approved by the FDA under and EmergencyUse Authorization (EUA).  Results from antibody testingshould not be used as the sole basis to diagnose or [id number] infection or to   inform infection status. Testresults should be correlated with clinical presentation ofpatient.     Orders Only on 07/15/2020   Component Date Value Ref Range Status    Sodium 07/15/2020 136  135 - 145 mmol/L Final    Potassium 07/15/2020 4.2  3.6 - 5.2 mmol/L Final    Chloride 07/15/2020 97* 100 - 108 mmol/L Final    CO2 07/15/2020 33* 21 - 32 mmol/L Final    Anion Gap 07/15/2020 6.0  3.0 - 11.0 mmol/L Final    BUN, Bld 07/15/2020 16  7 - 18 mg/dL Final    Creatinine 07/15/2020 1.07* 0.55 - 1.02 mg/dL Final    GFR ESTIMATION 07/15/2020 59* >60 Final               DESCRIPTION       GLOMERULAR FILTRATION RATE             NORMAL                     >60             KIDNEY  DISEASE           15-60             KIDNEY FAILURE             <15    BUN/Creatinine Ratio 07/15/2020 14.95  7 - 18 Ratio Final    Glucose 07/15/2020 204* 70 - 110 mg/dL  Final    Calcium 07/15/2020 9.6  8.8 - 10.5 mg/dL Final   Orders Only on 07/15/2020   Component Date Value Ref Range Status    Specimen Collection Method 07/15/2020 Cl Catch Mid Stream   Final    Color, UA 07/15/2020 Pale Yellow  Yellow Final    Appearance, UA 07/15/2020 Clear  Clear Final    pH, UA 07/15/2020 6.5  5.0 - 9.0 pH Final    Specific Gravity, UA 07/15/2020 1.010  1.000 - 1.030 Final    Protein, UA 07/15/2020 Negative  Negative mg/dL Final    Glucose, UA 07/15/2020 Normal  Normal mg/dL Final    Ketones, UA 07/15/2020 Negative  Negative mg/dL Final    Occult Blood UA 07/15/2020 10* Negative /uL Final    Nitrite, UA 07/15/2020 Negative  Negative Final    Bilirubin (UA) 07/15/2020 Negative  Negative mg/dL Final    Urobilinogen, UA 07/15/2020 Normal  Normal mg/dL Final    Leukocytes, UA 07/15/2020 25* Negative /uL Final    RBC, UA 07/15/2020 Rare  0 - 2 /HPF Final    WBC, UA 07/15/2020 0-2  0 - 5 /HPF Final    Squam Epithel, UA 07/15/2020 1+ Few  /LPF Final    Bacteria 07/15/2020 1+ Few  Few/HPF /HPF Final    Service Comment 03 07/15/2020 No, Criteria Not Met   Final   Orders Only on 07/15/2020   Component Date Value Ref Range Status    Neutrophils 07/15/2020 69.0  37 - 80 % Final    Neutrophils Absolute 07/15/2020 2.1  1.8 - 7.7 10*3/uL Final    Lymphocytes 07/15/2020 23.0* 25 - 40 % Final    Monocytes 07/15/2020 6.0  1 - 15 % Final    Eosinophils 07/15/2020 2.0  1 - 3 % Final    Cells Counted 07/15/2020 100   Final    Platelet Estimate 07/15/2020 Adequate   Final    RBC Morphology 07/15/2020 N/N   Final    WBC 07/15/2020 3.1* 4.6 - 10.2 10*3/uL Final    RBC 07/15/2020 4.00* 4.2 - 5.4 10*6/uL Final    Hemoglobin 07/15/2020 11.5* 12.0 - 16.0 g/dL Final    Hematocrit 07/15/2020 35.5* 37.0 - 47.0 % Final    MCV 07/15/2020 88.8  80 - 97 fL Final    MCH 07/15/2020 28.8  27.0 - 31.2 pg Final    MCHC 07/15/2020 32.4  31.8 - 35.4 g/dL Final    RDW RBC Auto-Rto 07/15/2020 13.9  12.5  - 18.0 % Final    Platelets 07/15/2020 176  142 - 424 10*3/uL Final    Manual nRBC per 100 Cells 07/15/2020 0.0  % Final   Procedure visit on 07/06/2020   Component Date Value Ref Range Status    POC Glucose, Urine 07/06/2020 Negative  Negative Final    POC Bilirubin, Urine 07/06/2020 Negative  Negative Final    POC Ketones, Urine 07/06/2020 Negative  Negative Final    POC Specific Gravity, Urine 07/06/2020 1.015  1.003 - 1.029 Final    POC Blood, Urine 07/06/2020 Negative  Negative Final    pH, UA 07/06/2020 7.0   Final    POC Protein, Urine 07/06/2020 Negative  Negative Final    POC Urobilinogen, Urine 07/06/2020 1.0  0.1 - 1.1 Final    POC Nitrates, Urine 07/06/2020 Negative  Negative Final    POC Leukocytes, Urine 07/06/2020 Negative  Negative Final    POC HYALIN, URINE 07/06/2020 0  lpf Final   Orders Only on 10/14/2019   Component Date Value Ref Range Status    Glucose 10/14/2019 164* 82 - 115 mg/dL Final    BUN, Bld 10/14/2019 15.8  8 - 23 mg/dL Final    Creatinine 10/14/2019 0.74  0.50 - 0.90 mg/dL Final    AST 10/14/2019 11  0 - 32 U/L Final    ALT (SGPT) 10/14/2019 11  0 - 33 U/L Final    Alkaline Phosphatase 10/14/2019 34* 35 - 105 U/L Final    Calcium 10/14/2019 9.9  8.6 - 10.2 mg/dL Final    Protein, Total 10/14/2019 7.1  6.4 - 8.3 g/dL Final    Albumin 10/14/2019 4.3  3.5 - 5.2 g/dL Final    BILIRUBIN, TOTAL 10/14/2019 0.31  0.00 - 1.20 mg/dL Final    Sodium 10/14/2019 133* 136 - 145 mmol/L Final    Potassium 10/14/2019 4.4  3.5 - 5.1 mmol/L Final    Chloride 10/14/2019 97* 98 - 107 mmol/L Final    CO2 10/14/2019 28  22 - 29 mmol/L Final    Globulin 10/14/2019 2.8  1.5 - 4.5 g/dL Final    Albumin/Globulin Ratio 10/14/2019 1.5  1.0 - 2.7 Final    BUN/Creatinine Ratio 10/14/2019 21.4* 6 - 20 Final    GFR ESTIMATION 10/14/2019 74.24  >60.00 Final    Comment: GFR ESTIMATION IS REPORTED AS ML/MIN/1.73M SQUARED.  IT IS RECOMMENDED  THAT GFR VALUES FOR  BE  MULTIPLIED X 1.212.  THE  HIGHER THE GFR, THE BETTER THE KIDNEY FUNCTION.  A GFR OF >60 IS  CONSIDERED NORMAL (DEPENDING ON AGE AND WHETHER THE PATIENT IS A MALE  OR FEMALE.      Anion Gap 10/14/2019 8.0  8.0 - 17.0 mmol/L Final    Comment: NOTE  Testing performed at:  [facility], [address] [place] [address] CLIA #:[id number]      WBC 10/14/2019 4.56  4.3 - 10.8 X 10 3/ul Final    RBC 10/14/2019 4.08* 4.2 - 5.4 X 10 6/ul Final    RDW-SD 10/14/2019 44.4  37 - 54 fl Final    Hemoglobin 10/14/2019 11.9* 12 - 16 g/dL Final    Hematocrit 10/14/2019 35.8* 37 - 47 % Final    MCV 10/14/2019 87.7  82 - 100 fl Final    MCH 10/14/2019 29.2  27 - 32 pg Final    MCHC 10/14/2019 33.2  32 - 36 g/dL Final    Platelets 10/14/2019 229  135 - 400 X 10 3/ul Final    Neutrophils 10/14/2019 39.8  % Final    Lymphocytes 10/14/2019 38.2  19.3 - 53.1 % Final    Monocytes 10/14/2019 15.6* 4.7 - 12.5 % Final    Eosinophils 10/14/2019 5.3  0.7 - 7.0 % Final    Basophils 10/14/2019 0.9  0.2 - 1.2 % Final    Neutrophils Absolute 10/14/2019 1.82* 2.15 - 7.56 X 10 3/ul Final    ABSOLUTE LYMPHOCYTE 10/14/2019 1.74  0.86 - 4.75 X 10 3/ul Final    ABSOLUTE MONOCYTE 10/14/2019 0.71  0.22 - 1.08 X 10 3/ul Final    ABSOLUTE EOSINOPHIL 10/14/2019 0.24  0.04 - 0.54 X 10 3/ul Final    ABSOLUTE BASOPHIL 10/14/2019 0.04  0.00 - 0.22 X 10 3/ul Final    ABSOLUTE IMMATURE GRAN 10/14/2019 0.01  0 - 0.04 X 10 3/ul Final    Immature Granulocytes 10/14/2019 0.2  0 - 0.5 % Final    IG INCLUDES METAMYELOCYTES, MYELOCYTES, AND PROMYELOCYTES    nRBC# 10/14/2019 0.0  0 - 0.2 /100 WBC Final    ABS NRBC COUNT 10/14/2019 0.000  0 - 0.012 x 10 3/ul Final    Comment: NOTE  Testing performed at:  [facility], [address] [place] [address]  [address] CLIA #:[id number]     Orders Only on 10/07/2019   Component Date Value Ref Range Status    Glucose 10/07/2019 186* 82 - 115 mg/dL Final    BUN, Bld 10/07/2019 12.0  8 -  23 mg/dL Final    Creatinine 10/07/2019 0.83  0.50 - 0.90 mg/dL Final    AST 10/07/2019 12  0 - 32 U/L Final    ALT (SGPT) 10/07/2019 10  0 - 33 U/L Final    Alkaline Phosphatase 10/07/2019 37  35 - 105 U/L Final    Calcium 10/07/2019 10.1  8.6 - 10.2 mg/dL Final    Protein, Total 10/07/2019 7.5  6.4 - 8.3 g/dL Final    Albumin 10/07/2019 4.6  3.5 - 5.2 g/dL Final    BILIRUBIN, TOTAL 10/07/2019 0.24  0.00 - 1.20 mg/dL Final    Sodium 10/07/2019 130* 136 - 145 mmol/L Final    Potassium 10/07/2019 4.6  3.5 - 5.1 mmol/L Final    Chloride 10/07/2019 92* 98 - 107 mmol/L Final    CO2 10/07/2019 28  22 - 29 mmol/L Final    Globulin 10/07/2019 2.9  1.5 - 4.5 g/dL Final    Albumin/Globulin Ratio 10/07/2019 1.6  1.0 - 2.7 Final    BUN/Creatinine Ratio 10/07/2019 14.5  6 - 20 Final    GFR ESTIMATION 10/07/2019 65.03  >60.00 Final    Comment: GFR ESTIMATION IS REPORTED AS ML/MIN/1.73M SQUARED.  IT IS RECOMMENDED  THAT GFR VALUES FOR  BE MULTIPLIED X 1.212.  THE  HIGHER THE GFR, THE BETTER THE KIDNEY FUNCTION.  A GFR OF >60 IS  CONSIDERED NORMAL (DEPENDING ON AGE AND WHETHER THE PATIENT IS A MALE  OR FEMALE.      Anion Gap 10/07/2019 10.0  8.0 - 17.0 mmol/L Final    Comment: NOTE  Testing performed at:  [facility], [address] [place] [address]  [id number] CLIA #:[id number]      WBC 10/07/2019 4.90  4.3 - 10.8 X 10 3/ul Final    RBC 10/07/2019 4.39  4.2 - 5.4 X 10 6/ul Final    RDW-SD 10/07/2019 41.9  37 - 54 fl Final    Hemoglobin 10/07/2019 12.9  12 - 16 g/dL Final    Hematocrit 10/07/2019 38.1  37 - 47 % Final    MCV 10/07/2019 86.8  82 - 100 fl Final    MCH 10/07/2019 29.4  27 - 32 pg Final    MCHC 10/07/2019 33.9  32 - 36 g/dL Final    Platelets 10/07/2019 239  135 - 400 X 10 3/ul Final    Neutrophils 10/07/2019 33.5  % Final    Lymphocytes 10/07/2019 45.7  19.3 - 53.1 % Final    Monocytes 10/07/2019 16.3* 4.7 - 12.5 % Final    Eosinophils 10/07/2019 3.7  0.7 - 7.0 %  Final    Basophils 10/07/2019 0.6  0.2 - 1.2 % Final    Neutrophils Absolute 10/07/2019 1.64* 2.15 - 7.56 X 10 3/ul Final    ABSOLUTE LYMPHOCYTE 10/07/2019 2.24  0.86 - 4.75 X 10 3/ul Final    ABSOLUTE MONOCYTE 10/07/2019 0.80  0.22 - 1.08 X 10 3/ul Final    ABSOLUTE EOSINOPHIL 10/07/2019 0.18  0.04 - 0.54 X 10 3/ul Final    ABSOLUTE BASOPHIL 10/07/2019 0.03  0.00 - 0.22 X 10 3/ul Final    ABSOLUTE IMMATURE GRAN 10/07/2019 0.01  0 - 0.04 X 10 3/ul Final    Immature Granulocytes 10/07/2019 0.2  0 - 0.5 % Final    IG INCLUDES METAMYELOCYTES, MYELOCYTES, AND PROMYELOCYTES    nRBC# 10/07/2019 0.0  0 - 0.2 /100 WBC Final    ABS NRBC COUNT 10/07/2019 0.000  0 - 0.012 x 10 3/ul Final    Comment: NOTE  Testing performed at:  [facility], [address] [place] [address] CLIA #:[id number]       All cell laboratory reviewed which shows white cell 1.7 and sodium 126  Imaging:     Assessment:     Principle Problem: Malignant 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 [C67.3]   Co-morbidity/Active Problems:   Patient Active Problem List   Diagnosis    Malignant neoplasm of urinary bladder    Essential hypertension    Type 2 diabetes mellitus without complication, without long-term current use of insulin    Encounter for antineoplastic chemotherapy    Chemotherapy-induced nausea    Chemotherapy-induced neutropenia    Absolute anemia        Sherice Haji is a 88 y.o. 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 Diagnoses of Essential hypertension and Type 2 diabetes mellitus without complication, without long-term current use of insulin were also pertinent to this visit., with Malignant neoplasm of anterior wall of  urinary bladder [C67.3]   87-year-old female otherwise healthy except hypertension diabetes present with bladder mass biopsy-proven muscle invasive bladder cancer( stage II) and     biopsy-proven muscle invasive bladder cancer( stage II)   Normal renal function  Neutropenia   chemo radiation therapy   recurrent muscle invasive bladder cancer    Plan:  Status post   Overall Plan:  Concurrent chemoradiation therapy, followed by optional cysctemy and full dose chemo C/G    --complete chemoradiation  --the overall plan is to concurrent chemoradiation therapy and bladder preservation.  If there is residual disease after completion of chemoradiation therapy the option will be cystectomy versus second-line immunotherapy to induce a local response in the bladder.  In easch case, she need full dose chemo C/G  After completion of local therapy.  for concerning more treatment; if we decide to give her chemotherapy I will use 75% of full dose given her advanced age      ==Labs CBC CMP next visit  == plan on immunotherapy   ==Repeat Urology and consider repeat cystoscope every 3 months to evaluate response   ==RTC 2 weeks to start immunotherapy    Signature    Misael Gordon M.D., Ph.D., [facility]  Hematology-Oncology    Signed 8/13/2020 9:47 AM   Note Ends Here  See  Adalid Valera MD [facility] [place] [phone]

## 2020-08-19 ENCOUNTER — TELEPHONE (OUTPATIENT)
Dept: HEMATOLOGY/ONCOLOGY | Facility: CLINIC | Age: 85
End: 2020-08-19

## 2020-08-19 NOTE — TELEPHONE ENCOUNTER
Spoke with Sherice GARCIA: 1932 she would like to hold off on starting treatment. States she has questions regarding treatment. Offered to answer questions but patient states she would like to speak to the physician at follow up on 20.

## 2020-08-28 ENCOUNTER — TELEPHONE (OUTPATIENT)
Dept: HEMATOLOGY/ONCOLOGY | Facility: CLINIC | Age: 85
End: 2020-08-28
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
## 2020-10-15 ENCOUNTER — TELEPHONE (OUTPATIENT)
Dept: HEMATOLOGY/ONCOLOGY | Facility: CLINIC | Age: 85
End: 2020-10-15

## 2020-10-15 NOTE — TELEPHONE ENCOUNTER
Pt stated she had labs done with Sejal Castillo on the 12th. Requested results from [place] office. Stated she was faxing them. Kb lpn 10/15/2020 352.

## 2020-10-16 ENCOUNTER — DOCUMENTATION ONLY (OUTPATIENT)
Dept: UROLOGY | Facility: CLINIC | Age: 85
End: 2020-10-16

## 2020-10-16 ENCOUNTER — OFFICE VISIT (OUTPATIENT)
Dept: HEMATOLOGY/ONCOLOGY | Facility: CLINIC | Age: 85
End: 2020-10-16
Payer: COMMERCIAL

## 2020-10-16 VITALS
OXYGEN SATURATION: 95 % | DIASTOLIC BLOOD PRESSURE: 90 MMHG | WEIGHT: 151.38 LBS | BODY MASS INDEX: 26.82 KG/M2 | SYSTOLIC BLOOD PRESSURE: 191 MMHG | RESPIRATION RATE: 16 BRPM | HEIGHT: 63 IN | TEMPERATURE: 97 F

## 2020-10-16 DIAGNOSIS — C67.3 MALIGNANT NEOPLASM OF ANTERIOR WALL OF URINARY BLADDER: Primary | ICD-10-CM

## 2020-10-16 PROCEDURE — 1159F MED LIST DOCD IN RCRD: CPT | Mod: S$GLB,,, | Performed by: INTERNAL MEDICINE

## 2020-10-16 PROCEDURE — 1125F AMNT PAIN NOTED PAIN PRSNT: CPT | Mod: S$GLB,,, | Performed by: INTERNAL MEDICINE

## 2020-10-16 PROCEDURE — 99214 PR OFFICE/OUTPT VISIT, EST, LEVL IV, 30-39 MIN: ICD-10-PCS | Mod: S$GLB,,, | Performed by: INTERNAL MEDICINE

## 2020-10-16 PROCEDURE — 1101F PR PT FALLS ASSESS DOC 0-1 FALLS W/OUT INJ PAST YR: ICD-10-PCS | Mod: CPTII,S$GLB,, | Performed by: INTERNAL MEDICINE

## 2020-10-16 PROCEDURE — 99214 OFFICE O/P EST MOD 30 MIN: CPT | Mod: S$GLB,,, | Performed by: INTERNAL MEDICINE

## 2020-10-16 PROCEDURE — 1159F PR MEDICATION LIST DOCUMENTED IN MEDICAL RECORD: ICD-10-PCS | Mod: S$GLB,,, | Performed by: INTERNAL MEDICINE

## 2020-10-16 PROCEDURE — 1125F PR PAIN SEVERITY QUANTIFIED, PAIN PRESENT: ICD-10-PCS | Mod: S$GLB,,, | Performed by: INTERNAL MEDICINE

## 2020-10-16 PROCEDURE — 1101F PT FALLS ASSESS-DOCD LE1/YR: CPT | Mod: CPTII,S$GLB,, | Performed by: INTERNAL MEDICINE

## 2020-10-16 NOTE — LETTER
October 16, 2020        Adalid Valera MD  [address] [facility]  [address]  [facility] [address]             [facility]  [address]  [place] [address]  Phone: [phone]  Fax: [phone]   Patient: Sherice Haji   MR Number: [id number]   YOB: 1932   Date of Visit: 10/16/2020       Dear Dr. Valera:    I saw  Sherice Haji  Today. She will see you next week. We plan to treat her with immunotherapy for several months, and then re-evalaute with cystoscope. After you see her, I would like to discuss with you about her plan and coordination of care.       If you have questions, please do not hesitate to call me. I look forward to following Sherice along with you.    Sincerely,      Misael Gordon MD           CC  No Recipients

## 2020-10-16 NOTE — PROGRESS NOTES
Hematology Oncology Progress Note    Hematology Oncology  [facility]     PATIENT: Sherice Haji  : 1932 88 y.o. female  MRN [id number]     PCP: Sejal Castillo MD  Consult Requested By:  No att. providers found    Date of Service: 10/16/2020    Subjective:   Interim History:  Follow-up (patient is requesting to discuss with provider about starting treatment.)    The patient doing well without any treatment in the past several months; she had no hematuria;  She was replaced by JANIE Aparicio and did not receive the plan immunotherapy  She could not see Dr Sheridan next week     Oncology History:    Sherice Haji is here for to followup for bladder cancer.  Current therapy patient doing well without new complaints she specifically denies hematuria or weight loss she has a good appetite.  This is a 87 y.o. female patient with a history of The primary encounter diagnosis was Malignant 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. Diagnoses of Essential hypertension and Type 2 diabetes mellitus without complication, without long-term current use of insulin were also pertinent to this visit., who is referred here for evaluation treatment of bladder cancer.  The patient has been feeling weak and for T for about 2 months.  Urinalysis shows hematuria or and she was referred to Urology.  CT scan with IV contrast demonstrates a bladder mass. Biopsy shows muscle invasive bladder cancer.     The patient lost about 10 lb over the past 2 months.  She denies any abdominal pain chest pain short of breath.     ==2019 completed concurrent chemoradiation therapy as a bladder preservation  ==2020 Cyst NEO in bladder  ==2020 discussed a chemotherapy options such as cisplatin gemcitabine as a 20 % dose reduction; she declined chemotherapy  ==2020 recurrence muscle invasive bladder cancer on cystoscope  ==2020 we discussed options for her; for those chemotherapy with cisplatin gemcitabine versus  immunotherapy with ago to induce local response and hopefully bladder preservation as a salvage; she prefer immunotherapy  == she was displaced by JANIE Aparicio       Oncology History   Malignant neoplasm of urinary bladder   9/25/2019 Initial Diagnosis    Malignant 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     10/21/2019 Cancer Staged    Staging form: Urinary Bladder, AJCC 8th Edition  - Clinical: Stage II (cT2, cN0, cM0)     10/31/2019 - 12/11/2019 Chemotherapy    Treatment Summary   Plan Name: cisplatin taxol weekly  Treatment Goal: Curative  Status: Inactive  Start Date: 10/31/2019  End Date: 12/5/2019  Provider: Hoda Josue NP  Chemotherapy: CISplatin (PLATINOL) 20 mg/m2 = 34 mg in sodium chloride 0.9% 500 mL chemo infusion, 20 mg/m2 = 34 mg (100 % of original dose 20 mg/m2), Intravenous, Clinic/HOD 1 time, 6 of 6 cycles  Dose modification: 20 mg/m2 (original dose 20 mg/m2, Cycle 1)  PACLitaxel (TAXOL) 50 mg/m2 = 84 mg in sodium chloride 0.9% 500 mL chemo infusion, 50 mg/m2 = 84 mg (100 % of original dose 50 mg/m2), Intravenous, Clinic/HOD 1 time, 6 of 6 cycles  Dose modification: 50 mg/m2 (original dose 50 mg/m2, Cycle 1)     6/24/2020 - 6/24/2020 Chemotherapy    Treatment Summary   Plan Name: OP BLADDER GEMCITABINE CISPLATIN (SPLIT DOSE CISPLATIN) Q3W  Treatment Goal: Curative  Status: Inactive  Start Date:   End Date:   Provider: Misael Gordon MD  Chemotherapy: CISplatin (PLATINOL) 35 mg/m2 = 62 mg in sodium chloride 0.9% 500 mL chemo infusion, 35 mg/m2, Intravenous, Clinic/HOD 1 time, 0 of 4 cycles  gemcitabine (GEMZAR) 1,780 mg in sodium chloride 0.9% 250 mL chemo infusion, 1,000 mg/m2, Intravenous, Clinic/HOD 1 time, 0 of 4 cycles     10/20/2020 -  Chemotherapy    Treatment Summary   Plan Name: OP PEMBROLIZUMAB 200MG Q3W  Treatment Goal: Control  Status: Active  Start Date: 10/20/2020 (Planned)  End Date: 2/2/2021 (Planned)  Provider: Misael Gordon MD  Chemotherapy: pembrolizumab (KEYTRUDA) 200 mg in  "sodium chloride 0.9% 100 mL chemo infusion, 200 mg, Intravenous, Clinic/HOD 1 time, 0 of 6 cycles         Oncologic History:      Oncologic History     Oncologic Treatment     Pathology      Past Medical History:   Past Medical History:   Diagnosis Date    Absolute anemia 12/5/2019    Allergy     Anxiety     Arthritis     Bladder cancer     Cataract     Removed in May 2019    Depression     Diabetes mellitus     Hypertension     Malignant 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 9/25/2019    Type 2 diabetes mellitus without complication, without long-term current use of insulin 9/25/2019       Past Surgical HIstory:   Past Surgical History:   Procedure Laterality Date    BLADDER SURGERY      surgery on July 22 2020 to see if patients cancer was still present    BLADDER TUMOR EXCISION  09/2019    CATARACT EXTRACTION, BILATERAL  05/2019       Allergies:  Review of patient's allergies indicates:   Allergen Reactions    Aspirin        Medications:  Current Outpatient Medications   Medication Sig Dispense Refill    ACCU-CHEK FASTCLIX LANCET DRUM Misc       ACCU-CHEK SMARTVIEW CONTRL SOL Soln       ACCU-CHEK SMARTVIEW TEST STRIP Strp       BD ALCOHOL SWABS PadM       benzonatate (TESSALON) 200 MG capsule       diphenoxylate-atropine 2.5-0.025 mg (LOMOTIL) 2.5-0.025 mg per tablet       DROPLET PEN NEEDLE 32 gauge x 5/32" Ndle       glimepiride (AMARYL) 2 MG tablet       HYDROcodone-acetaminophen (NORCO) 5-325 mg per tablet       hyoscyamine (LEVSIN/SL) 0.125 mg Subl       indapamide (LOZOL) 2.5 MG Tab       LANTUS SOLOSTAR U-100 INSULIN glargine 100 units/mL (3mL) SubQ pen       levothyroxine (SYNTHROID) 75 MCG tablet       loratadine (CLARITIN) 10 mg tablet       losartan (COZAAR) 25 MG tablet       meclizine (ANTIVERT) 12.5 mg tablet       meloxicam (MOBIC) 7.5 MG tablet       metFORMIN (GLUCOPHAGE-XR) 500 MG 24 hr tablet       metoprolol tartrate (LOPRESSOR) 50 MG tablet       " ondansetron (ZOFRAN) 8 MG tablet Take 1 tablet (8 mg total) by mouth every 12 (twelve) hours as needed for Nausea. 30 tablet 2    simvastatin (ZOCOR) 20 MG tablet       traMADoL (ULTRAM) 50 mg tablet       traZODone (DESYREL) 50 MG tablet        No current facility-administered medications for this visit.        Family History:   Family History   Problem Relation Age of Onset    No Known Problems Mother     No Known Problems Father     Breast cancer Sister     Prostate cancer Brother     No Known Problems Maternal Aunt     No Known Problems Maternal Uncle     No Known Problems Paternal Aunt     No Known Problems Paternal Uncle     No Known Problems Maternal Grandmother     No Known Problems Maternal Grandfather     No Known Problems Paternal Grandmother     No Known Problems Paternal Grandfather     Breast cancer Sister     Prostate cancer Brother        Social History:  reports that she has never smoked. She has never used smokeless tobacco. She reports previous alcohol use. She reports that she does not use drugs.    Review of Systemas  Constitutional: No change in weight, appetie, fatigue, fever, or night sweats  Eyes: No changes in vision  Ears, Nose, Mouth, Throat, and Face: No hearing problems, ear pain, rummy nose, or sore throat  Respiratory: No shortness of breath or cough  Cardiovascular: No chest pain, palpitations or dizziness  Gastrointestinal: No abdominal pain, hematochezia, melena  Genitourinary: No dysuria, hematuria, nocturia, menstrual problems, post menopausal  Integumentary/Breast: No rashes or itching  Hematologic/Lymphatic: No anemia or bleeding abnormalities  Musculoskeletal: No joint or muscle abnormalities  Neurological: No sensory or motor problems, no headaches  Behavioral/Psych: No depression, anxiety, cognitive problems, or stress  Endocrine: No diabetes or thyroid problems  Allergic/Immunologic: See allergy above    Physical Exam      Vitals:   Vitals:    10/16/20 1319  "  BP: (!) 191/90   BP Location: Left arm   Patient Position: Sitting   BP Method: Medium (Automatic)   Resp: 16   Temp: 97 °F (36.1 °C)   TempSrc: Temporal   SpO2: 95%   Weight: 68.7 kg (151 lb 6 oz)   Height: 5' 3" (1.6 m)     BMI: Body mass index is 26.81 kg/m².  BSA Body surface area is 1.75 meters squared.  ECOG Performance Status:   ECOG SCORE    1 - Restricted in strenuous activity-ambulatory and able to carry out work of a light nature      ECOG 1    GENERAL APPEARANCE: Well developed, well nourished, in no acute distress.  SKIN: Inspection of the skin reveals no rashes, ulcerations or petechiae.  HEENT: The sclerae were anicteric and conjunctivae were pink and moist. Extraocular movements were intact and pupils were equal, round, and reactive to light with normal accommodation. External inspection of the ears and nose showed no scars, lesions, or masses. Lips, teeth, and gums showed normal mucosa. The oral mucosa, hard and soft palate, tongue and posterior pharynx were normal.  NECK: Supple and symmetric. There was no thyroid enlargement, and no tenderness, or masses were felt.  CRESPIRATORY: Normal AP diameter and normal contour without any kyphoscoliosis. LUNGS: Auscultation of the lungs revealed normal breath sounds without any other adventitious sounds or rubs.  CARDIOVASCULAR: There was a regular rate and rhythm without any murmurs, gallops, rubs. The carotid pulses were normal and 2+ bilaterally without bruits. Peripheral pulses were 2+ and symmetric.  GASTROINTESTINAL: Soft and nontender with normal bowel sounds. The liver span was approximately 5-6 cm in the right midclavicular line by percussion. The liver edge was nontender. The spleen was not palpable. There were no inguinal or umbilical hernias noted. No ascites was noted.  LYMPH NODES: No lymphadenopathy was appreciated in the neck, axillae or groin.  MUSCULOSKELETAL: Gait was normal. There was no tenderness or effusions noted. Muscle strength " and tone were normal. EXTREMITIES: No cyanosis, clubbing or edema.  NEUROLOGIC: Alert and oriented x 3. Normal affect. Gait was normal. Normal deep tendon reflexes with no pathological reflexes. Sensation to touch was normal.  PSYCHIATRIC: good mood, orientated to place, time and person     Labs and Imagings     Orders Only on 2020   Component Date Value Ref Range Status    POC Glucose 2020 130* 70 - 105 Final    POC Test Performed   -  Diabetic Fastin-180 mg/dl   Orders Only on 2020   Component Date Value Ref Range Status    POC Glucose 2020 139* 70 - 105 Final    POC Test Performed   -  Diabetic Fastin-180 mg/dl   Orders Only on 07/15/2020   Component Date Value Ref Range Status    COVID-19 Ab, IgM 07/15/2020 Negative  Negative Final    IgM usually appears within 3-5 days of symptoms.  Negativeresults do not rule out SARS-CoV-2 infection, particularlyin those who have been in contact with the virus.    Antibody SARS CoV-2 07/15/2020 Negative  Negative Final    Comment: IgG usually appears approximately 10 days after symptomsThis test has been approved by the FDA under and EmergencyUse Authorization (EUA).  Results from antibody testingshould not be used as the sole basis to diagnose or audubpoBTQX-HiC-0 infection or to   inform infection status. Testresults should be correlated with clinical presentation ofpatient.     Orders Only on 07/15/2020   Component Date Value Ref Range Status    Sodium 07/15/2020 136  135 - 145 mmol/L Final    Potassium 07/15/2020 4.2  3.6 - 5.2 mmol/L Final    Chloride 07/15/2020 97* 100 - 108 mmol/L Final    CO2 07/15/2020 33* 21 - 32 mmol/L Final    Anion Gap 07/15/2020 6.0  3.0 - 11.0 mmol/L Final    BUN, Bld 07/15/2020 16  7 - 18 mg/dL Final    Creatinine 07/15/2020 1.07* 0.55 - 1.02 mg/dL Final    GFR ESTIMATION 07/15/2020 59* >60 Final               DESCRIPTION       GLOMERULAR FILTRATION RATE             NORMAL                     >60              KIDNEY  DISEASE           15-60             KIDNEY FAILURE             <15    BUN/Creatinine Ratio 07/15/2020 14.95  7 - 18 Ratio Final    Glucose 07/15/2020 204* 70 - 110 mg/dL Final    Calcium 07/15/2020 9.6  8.8 - 10.5 mg/dL Final   Orders Only on 07/15/2020   Component Date Value Ref Range Status    Specimen Collection Method 07/15/2020 Cl Catch Mid Stream   Final    Color, UA 07/15/2020 Pale Yellow  Yellow Final    Appearance, UA 07/15/2020 Clear  Clear Final    pH, UA 07/15/2020 6.5  5.0 - 9.0 pH Final    Specific Gravity, UA 07/15/2020 1.010  1.000 - 1.030 Final    Protein, UA 07/15/2020 Negative  Negative mg/dL Final    Glucose, UA 07/15/2020 Normal  Normal mg/dL Final    Ketones, UA 07/15/2020 Negative  Negative mg/dL Final    Occult Blood UA 07/15/2020 10* Negative /uL Final    Nitrite, UA 07/15/2020 Negative  Negative Final    Bilirubin (UA) 07/15/2020 Negative  Negative mg/dL Final    Urobilinogen, UA 07/15/2020 Normal  Normal mg/dL Final    Leukocytes, UA 07/15/2020 25* Negative /uL Final    RBC, UA 07/15/2020 Rare  0 - 2 /HPF Final    WBC, UA 07/15/2020 0-2  0 - 5 /HPF Final    Squam Epithel, UA 07/15/2020 1+ Few  /LPF Final    Bacteria 07/15/2020 1+ Few  Few/HPF /HPF Final    Service Comment 03 07/15/2020 No, Criteria Not Met   Final   Orders Only on 07/15/2020   Component Date Value Ref Range Status    Neutrophils 07/15/2020 69.0  37 - 80 % Final    Neutrophils Absolute 07/15/2020 2.1  1.8 - 7.7 10*3/uL Final    Lymphocytes 07/15/2020 23.0* 25 - 40 % Final    Monocytes 07/15/2020 6.0  1 - 15 % Final    Eosinophils 07/15/2020 2.0  1 - 3 % Final    Cells Counted 07/15/2020 100   Final    Platelet Estimate 07/15/2020 Adequate   Final    RBC Morphology 07/15/2020 N/N   Final    WBC 07/15/2020 3.1* 4.6 - 10.2 10*3/uL Final    RBC 07/15/2020 4.00* 4.2 - 5.4 10*6/uL Final    Hemoglobin 07/15/2020 11.5* 12.0 - 16.0 g/dL Final    Hematocrit 07/15/2020 35.5* 37.0 -  47.0 % Final    MCV 07/15/2020 88.8  80 - 97 fL Final    MCH 07/15/2020 28.8  27.0 - 31.2 pg Final    MCHC 07/15/2020 32.4  31.8 - 35.4 g/dL Final    RDW RBC Auto-Rto 07/15/2020 13.9  12.5 - 18.0 % Final    Platelets 07/15/2020 176  142 - 424 10*3/uL Final    Manual nRBC per 100 Cells 07/15/2020 0.0  % Final   Procedure visit on 07/06/2020   Component Date Value Ref Range Status    POC Glucose, Urine 07/06/2020 Negative  Negative Final    POC Bilirubin, Urine 07/06/2020 Negative  Negative Final    POC Ketones, Urine 07/06/2020 Negative  Negative Final    POC Specific Gravity, Urine 07/06/2020 1.015  1.003 - 1.029 Final    POC Blood, Urine 07/06/2020 Negative  Negative Final    pH, UA 07/06/2020 7.0   Final    POC Protein, Urine 07/06/2020 Negative  Negative Final    POC Urobilinogen, Urine 07/06/2020 1.0  0.1 - 1.1 Final    POC Nitrates, Urine 07/06/2020 Negative  Negative Final    POC Leukocytes, Urine 07/06/2020 Negative  Negative Final    POC HYALIN, URINE 07/06/2020 0  lpf Final     All cell laboratory reviewed which shows white cell 1.7 and sodium 126  Imaging:     Assessment:     Principle Problem: Malignant 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 [C67.3]   Co-morbidity/Active Problems:   Patient Active Problem List   Diagnosis    Malignant neoplasm of urinary bladder    Essential hypertension    Type 2 diabetes mellitus without complication, without long-term current use of insulin    Encounter for antineoplastic chemotherapy    Chemotherapy-induced nausea    Chemotherapy-induced neutropenia    Absolute anemia        Sherice Haji is a 88 y.o. 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 Diagnoses of  Essential hypertension and Type 2 diabetes mellitus without complication, without long-term current use of insulin were also pertinent to this visit., with Malignant 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 [C67.3]   87-year-old female otherwise healthy except hypertension diabetes present with bladder mass biopsy-proven muscle invasive bladder cancer( stage II) and     biopsy-proven muscle invasive bladder cancer( stage II) recurrence locally  Normal renal function  Neutropenia  Status post chemo radiation therapy December 2019  recurrent muscle invasive bladder cancer    Plan:  Status post   Overall Plan:  Concurrent chemoradiation therapy, followed by optional cysctemy and full dose chemo C/G; patient does not want chemotherapy so the other option is immunotherapy    ==Labs CBC CMP next visit  == plan on immunotherapy after visit with Dr Valera  ==Repeat Urology and consider repeat cystoscope every 3 months to evaluate response   ==RTC 2 weeks to start immunotherapy  == will do CT scan for restaging prior to immunotherapy  Signature    Misael Gordon M.D., Ph.D., [facility]  Hematology-Oncology    Signed 10/16/2020 9:47 AM   Note Ends Here  See  Adalid Valera MD [facility] [place] [id number] [phone]

## 2020-10-16 NOTE — PROGRESS NOTES
Copy of lab results:  CBC with auto diff, hemoglobin A1c, and CMP drawn on 10/12/2020 received via fax from PCP.  GFR stable at greater than 60.  Will send copy of labs to be scanned into chart.

## 2020-10-22 ENCOUNTER — OFFICE VISIT (OUTPATIENT)
Dept: UROLOGY | Facility: CLINIC | Age: 85
End: 2020-10-22
Payer: COMMERCIAL

## 2020-10-22 VITALS — DIASTOLIC BLOOD PRESSURE: 85 MMHG | SYSTOLIC BLOOD PRESSURE: 140 MMHG | HEART RATE: 78 BPM

## 2020-10-22 DIAGNOSIS — C67.9 MALIGNANT NEOPLASM OF URINARY BLADDER, UNSPECIFIED SITE: Primary | ICD-10-CM

## 2020-10-22 PROCEDURE — 99213 PR OFFICE/OUTPT VISIT, EST, LEVL III, 20-29 MIN: ICD-10-PCS | Mod: S$GLB,,, | Performed by: SPECIALIST

## 2020-10-22 PROCEDURE — 1159F PR MEDICATION LIST DOCUMENTED IN MEDICAL RECORD: ICD-10-PCS | Mod: S$GLB,,, | Performed by: SPECIALIST

## 2020-10-22 PROCEDURE — 1101F PT FALLS ASSESS-DOCD LE1/YR: CPT | Mod: CPTII,S$GLB,, | Performed by: SPECIALIST

## 2020-10-22 PROCEDURE — 1159F MED LIST DOCD IN RCRD: CPT | Mod: S$GLB,,, | Performed by: SPECIALIST

## 2020-10-22 PROCEDURE — 1101F PR PT FALLS ASSESS DOC 0-1 FALLS W/OUT INJ PAST YR: ICD-10-PCS | Mod: CPTII,S$GLB,, | Performed by: SPECIALIST

## 2020-10-22 PROCEDURE — 99213 OFFICE O/P EST LOW 20 MIN: CPT | Mod: S$GLB,,, | Performed by: SPECIALIST

## 2020-10-22 NOTE — PROGRESS NOTES
Subjective:       Patient ID: Sherice Haji is a 88 y.o. female.    Chief Complaint: Other (6 week post op )      HPI:  88-year-old  female known to me who presents for follow up.  She was diagnosed with muscle invasive bladder cancer in early 2019 after urology was consulted when she was in the hospital for gross hematuria.       She proceed with chemoradiation she completed in December of 2019.  Family and the patient did not think that she will be able to tolerate a cystectomy.  Surveil her with cystoscopy see in the office.  Most recent cystoscopy in July 2020 showed a recurrent lesion and bladder.  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    She was presented at our tumor board and sent her back to the medical oncologist.  She has since then met with Dr. Gordon and the plan is to proceed with immunotherapy.  She is here today for interim check.    Her only complaint today is fatigue and lack of energy.  But she is still able to move around and perform her activities of daily living.  She denies any weight loss       Past Medical History:   Past Medical History:   Diagnosis Date    Absolute anemia 12/5/2019    Allergy     Anxiety     Arthritis     Bladder cancer     Cataract     Removed in May 2019    Depression     Diabetes mellitus     Hypertension     Malignant 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 9/25/2019    Type 2 diabetes mellitus without complication, without long-term current use of insulin 9/25/2019       Past Surgical Historical:   Past Surgical History:   Procedure Laterality Date    BLADDER SURGERY      surgery on July 22 2020 to see if patients cancer was still present    BLADDER TUMOR EXCISION  09/2019    CATARACT EXTRACTION, BILATERAL  05/2019        Medications:   Medication List with Changes/Refills   Current Medications    ACCU-CHEK FASTCLIX LANCET DRUM MISC         "ACCU-CHEK SMARTVIEW CONTRL SOL SOLN        ACCU-CHEK SMARTVIEW TEST STRIP STRP        BD ALCOHOL SWABS PADM        BENZONATATE (TESSALON) 200 MG CAPSULE        DIPHENOXYLATE-ATROPINE 2.5-0.025 MG (LOMOTIL) 2.5-0.025 MG PER TABLET        DROPLET PEN NEEDLE 32 GAUGE X 5/32" NDLE        GLIMEPIRIDE (AMARYL) 2 MG TABLET        HYDROCODONE-ACETAMINOPHEN (NORCO) 5-325 MG PER TABLET        HYOSCYAMINE (LEVSIN/SL) 0.125 MG SUBL        INDAPAMIDE (LOZOL) 2.5 MG TAB        LANTUS SOLOSTAR U-100 INSULIN GLARGINE 100 UNITS/ML (3ML) SUBQ PEN        LEVOTHYROXINE (SYNTHROID) 75 MCG TABLET        LORATADINE (CLARITIN) 10 MG TABLET        LOSARTAN (COZAAR) 25 MG TABLET        MECLIZINE (ANTIVERT) 12.5 MG TABLET        MELOXICAM (MOBIC) 7.5 MG TABLET        METFORMIN (GLUCOPHAGE-XR) 500 MG 24 HR TABLET        METOPROLOL TARTRATE (LOPRESSOR) 50 MG TABLET        ONDANSETRON (ZOFRAN) 8 MG TABLET    Take 1 tablet (8 mg total) by mouth every 12 (twelve) hours as needed for Nausea.    SIMVASTATIN (ZOCOR) 20 MG TABLET        TRAMADOL (ULTRAM) 50 MG TABLET        TRAZODONE (DESYREL) 50 MG TABLET            Past Social History:   Social History     Socioeconomic History    Marital status:      Spouse name: Not on file    Number of children: Not on file    Years of education: Not on file    Highest education level: Not on file   Occupational History    Not on file   Social Needs    Financial resource strain: Not on file    Food insecurity     Worry: Not on file     Inability: Not on file    Transportation needs     Medical: Not on file     Non-medical: Not on file   Tobacco Use    Smoking status: Never Smoker    Smokeless tobacco: Never Used   Substance and Sexual Activity    Alcohol use: Not Currently    Drug use: Never    Sexual activity: Not Currently   Lifestyle    Physical activity     Days per week: Not on file     Minutes per session: Not on file    Stress: Not on file   Relationships    Social connections     " Talks on phone: Not on file     Gets together: Not on file     Attends Voodoo service: Not on file     Active member of club or organization: Not on file     Attends meetings of clubs or organizations: Not on file     Relationship status: Not on file   Other Topics Concern    Not on file   Social History Narrative    Not on file       Allergies:   Review of patient's allergies indicates:   Allergen Reactions    Aspirin         Family History:   Family History   Problem Relation Age of Onset    No Known Problems Mother     No Known Problems Father     Breast cancer Sister     Prostate cancer Brother     No Known Problems Maternal Aunt     No Known Problems Maternal Uncle     No Known Problems Paternal Aunt     No Known Problems Paternal Uncle     No Known Problems Maternal Grandmother     No Known Problems Maternal Grandfather     No Known Problems Paternal Grandmother     No Known Problems Paternal Grandfather     Breast cancer Sister     Prostate cancer Brother         Review of Systems:   systems reviewed and notable for muscle invasive bladder cancer  All other systems were reviewed Neg except as stated in the HPI    Physical Exam:  General: A&Ox3. No apparent distress. No deformities.  Neck: No masses. Normal thyroid.  Lungs: normal inspiration. No use of accessory muscles.  Heart: normal pulse. No arrhythmias.  Abdomen: Soft. NT. ND. No masses. No hernias. No hepatosplenomegaly.  Lymphatic: Neck and groin nodes negative.  Skin: The skin is warm and dry. No jaundice.  Neurology: Cranial nerves 2-12 crossly intact, no focal weaknesses, no sensation deficits, no motor deficits  Ext: No clubbing, cyanosis or edema.  :  Deferred      Assessment/Plan:       88-year-old female muscle invasive bladder cancer, recurrent disease after she completed chemo radiation.    1.  She is now about to start immunotherapy with the medical oncologist.  2.  She return to clinic in 3 months for interim check.   We plan to surveil her cystoscopically to see if she is responding to therapy.    Problem List Items Addressed This Visit        Oncology    Malignant neoplasm of urinary bladder - Primary    Relevant Orders    POCT Urinalysis (w/Micro Option)    Urine culture

## 2020-10-23 ENCOUNTER — TELEPHONE (OUTPATIENT)
Dept: UROLOGY | Facility: CLINIC | Age: 85
End: 2020-10-23

## 2020-10-23 NOTE — TELEPHONE ENCOUNTER
Contacted pt in regards of rx being filled. Advised pt that  I didn't see anything in the notes, we'll make a determination of what's needed once her urine culture comes back. Pt verbalized understanding. BJP      ----- Message from Nickie Cosby sent at 10/23/2020 10:45 AM CDT -----  Patient need to speak to nurse regarding calling out medication.. Call back number [phone]. Mike

## 2020-11-05 ENCOUNTER — OFFICE VISIT (OUTPATIENT)
Dept: HEMATOLOGY/ONCOLOGY | Facility: CLINIC | Age: 85
End: 2020-11-05
Payer: COMMERCIAL

## 2020-11-05 VITALS
OXYGEN SATURATION: 98 % | BODY MASS INDEX: 27 KG/M2 | WEIGHT: 152.38 LBS | RESPIRATION RATE: 16 BRPM | HEIGHT: 63 IN | SYSTOLIC BLOOD PRESSURE: 190 MMHG | HEART RATE: 84 BPM | TEMPERATURE: 97 F | DIASTOLIC BLOOD PRESSURE: 95 MMHG

## 2020-11-05 DIAGNOSIS — C68.9 MALIGNANT NEOPLASM OF URINARY ORGAN, UNSPECIFIED: Primary | ICD-10-CM

## 2020-11-05 PROCEDURE — 1126F AMNT PAIN NOTED NONE PRSNT: CPT | Mod: S$GLB,,, | Performed by: INTERNAL MEDICINE

## 2020-11-05 PROCEDURE — 99215 OFFICE O/P EST HI 40 MIN: CPT | Mod: S$GLB,,, | Performed by: INTERNAL MEDICINE

## 2020-11-05 PROCEDURE — 1101F PT FALLS ASSESS-DOCD LE1/YR: CPT | Mod: CPTII,S$GLB,, | Performed by: INTERNAL MEDICINE

## 2020-11-05 PROCEDURE — 1159F PR MEDICATION LIST DOCUMENTED IN MEDICAL RECORD: ICD-10-PCS | Mod: S$GLB,,, | Performed by: INTERNAL MEDICINE

## 2020-11-05 PROCEDURE — 99215 PR OFFICE/OUTPT VISIT, EST, LEVL V, 40-54 MIN: ICD-10-PCS | Mod: S$GLB,,, | Performed by: INTERNAL MEDICINE

## 2020-11-05 PROCEDURE — 1159F MED LIST DOCD IN RCRD: CPT | Mod: S$GLB,,, | Performed by: INTERNAL MEDICINE

## 2020-11-05 PROCEDURE — 1126F PR PAIN SEVERITY QUANTIFIED, NO PAIN PRESENT: ICD-10-PCS | Mod: S$GLB,,, | Performed by: INTERNAL MEDICINE

## 2020-11-05 PROCEDURE — 1101F PR PT FALLS ASSESS DOC 0-1 FALLS W/OUT INJ PAST YR: ICD-10-PCS | Mod: CPTII,S$GLB,, | Performed by: INTERNAL MEDICINE

## 2020-11-05 NOTE — PROGRESS NOTES
Hematology Oncology Progress Note    Hematology Oncology  [facility]     PATIENT: Sherice Haji  : 1932 88 y.o. female  MRN [id number]     PCP: Sejal Castillo MD  Consult Requested By:  No att. providers found    Date of Service: 2020    Subjective:   Interim History:  Malignant 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    The patient doing well without any treatment in the past several months; she had no hematuria;  She is ready for starting immunotherapy     Oncology History:    Sherice Haji is here for to followup for bladder cancer.  Current therapy patient doing well without new complaints she specifically denies hematuria or weight loss she has a good appetite.  This is a 87 y.o. female patient with a history of The primary encounter diagnosis was Malignant 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. Diagnoses of Essential hypertension and Type 2 diabetes mellitus without complication, without long-term current use of insulin were also pertinent to this visit., who is referred here for evaluation treatment of bladder cancer.  The patient has been feeling weak and for T for about 2 months.  Urinalysis shows hematuria or and she was referred to Urology.  CT scan with IV contrast demonstrates a bladder mass. Biopsy shows muscle invasive bladder cancer.     The patient lost about 10 lb over the past 2 months.  She denies any abdominal pain chest pain short of breath.     ==2019 completed concurrent chemoradiation therapy as a bladder preservation  ==2020 Cyst NEO in bladder  ==2020 discussed a chemotherapy options such as cisplatin gemcitabine as a 20 % dose reduction; she declined chemotherapy  ==2020 recurrence muscle invasive bladder cancer on cystoscope  ==2020 we discussed options for her; for those chemotherapy with cisplatin gemcitabine versus immunotherapy with ago to induce local response and hopefully bladder preservation as a salvage; she prefer  immunotherapy  == she was displaced by JANIE Aparicio  ==11/2020 Keytruda       Oncology History   Malignant neoplasm of urinary bladder   9/25/2019 Initial Diagnosis    Malignant 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     10/21/2019 Cancer Staged    Staging form: Urinary Bladder, AJCC 8th Edition  - Clinical: Stage II (cT2, cN0, cM0)     10/31/2019 - 12/11/2019 Chemotherapy    Treatment Summary   Plan Name: cisplatin taxol weekly  Treatment Goal: Curative  Status: Inactive  Start Date: 10/31/2019  End Date: 12/5/2019  Provider: Hoda Josue NP  Chemotherapy: CISplatin (PLATINOL) 20 mg/m2 = 34 mg in sodium chloride 0.9% 500 mL chemo infusion, 20 mg/m2 = 34 mg (100 % of original dose 20 mg/m2), Intravenous, Clinic/HOD 1 time, 6 of 6 cycles  Dose modification: 20 mg/m2 (original dose 20 mg/m2, Cycle 1)  PACLitaxel (TAXOL) 50 mg/m2 = 84 mg in sodium chloride 0.9% 500 mL chemo infusion, 50 mg/m2 = 84 mg (100 % of original dose 50 mg/m2), Intravenous, Clinic/HOD 1 time, 6 of 6 cycles  Dose modification: 50 mg/m2 (original dose 50 mg/m2, Cycle 1)     6/24/2020 - 6/24/2020 Chemotherapy    Treatment Summary   Plan Name: OP BLADDER GEMCITABINE CISPLATIN (SPLIT DOSE CISPLATIN) Q3W  Treatment Goal: Curative  Status: Inactive  Start Date:   End Date:   Provider: Misael Gordon MD  Chemotherapy: CISplatin (PLATINOL) 35 mg/m2 = 62 mg in sodium chloride 0.9% 500 mL chemo infusion, 35 mg/m2, Intravenous, Clinic/HOD 1 time, 0 of 4 cycles  gemcitabine (GEMZAR) 1,780 mg in sodium chloride 0.9% 250 mL chemo infusion, 1,000 mg/m2, Intravenous, Clinic/HOD 1 time, 0 of 4 cycles     11/11/2020 -  Chemotherapy    Treatment Summary   Plan Name: OP PEMBROLIZUMAB 200MG Q3W  Treatment Goal: Control  Status: Active  Start Date: 11/11/2020 (Planned)  End Date: 2/24/2021 (Planned)  Provider: Misael Gordon MD  Chemotherapy: pembrolizumab (KEYTRUDA) 200 mg in sodium chloride 0.9% 100 mL chemo infusion, 200 mg, Intravenous, Clinic/HOD 1 time, 0 of 6  "cycles         Oncologic History:      Oncologic History     Oncologic Treatment     Pathology      Past Medical History:   Past Medical History:   Diagnosis Date    Absolute anemia 12/5/2019    Allergy     Anxiety     Arthritis     Bladder cancer     Cataract     Removed in May 2019    Depression     Diabetes mellitus     Hypertension     Malignant 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 9/25/2019    Type 2 diabetes mellitus without complication, without long-term current use of insulin 9/25/2019       Past Surgical HIstory:   Past Surgical History:   Procedure Laterality Date    BLADDER SURGERY      surgery on July 22 2020 to see if patients cancer was still present    BLADDER TUMOR EXCISION  09/2019    CATARACT EXTRACTION, BILATERAL  05/2019       Allergies:  Review of patient's allergies indicates:   Allergen Reactions    Aspirin        Medications:  Current Outpatient Medications   Medication Sig Dispense Refill    ACCU-CHEK FASTCLIX LANCET DRUM Misc       ACCU-CHEK SMARTVIEW CONTRL SOL Soln       ACCU-CHEK SMARTVIEW TEST STRIP Strp       BD ALCOHOL SWABS PadM       benzonatate (TESSALON) 200 MG capsule       diphenoxylate-atropine 2.5-0.025 mg (LOMOTIL) 2.5-0.025 mg per tablet       DROPLET PEN NEEDLE 32 gauge x 5/32" Ndle       glimepiride (AMARYL) 2 MG tablet       HYDROcodone-acetaminophen (NORCO) 5-325 mg per tablet       hyoscyamine (LEVSIN/SL) 0.125 mg Subl       indapamide (LOZOL) 2.5 MG Tab       LANTUS SOLOSTAR U-100 INSULIN glargine 100 units/mL (3mL) SubQ pen       levothyroxine (SYNTHROID) 75 MCG tablet       loratadine (CLARITIN) 10 mg tablet       losartan (COZAAR) 25 MG tablet       meclizine (ANTIVERT) 12.5 mg tablet       meloxicam (MOBIC) 7.5 MG tablet       metFORMIN (GLUCOPHAGE-XR) 500 MG 24 hr tablet       metoprolol tartrate (LOPRESSOR) 50 MG tablet       ondansetron (ZOFRAN) 8 MG tablet Take 1 tablet (8 mg total) by mouth every 12 (twelve) hours " as needed for Nausea. 30 tablet 2    simvastatin (ZOCOR) 20 MG tablet       traMADoL (ULTRAM) 50 mg tablet       traZODone (DESYREL) 50 MG tablet        No current facility-administered medications for this visit.        Family History:   Family History   Problem Relation Age of Onset    No Known Problems Mother     No Known Problems Father     Breast cancer Sister     Prostate cancer Brother     No Known Problems Maternal Aunt     No Known Problems Maternal Uncle     No Known Problems Paternal Aunt     No Known Problems Paternal Uncle     No Known Problems Maternal Grandmother     No Known Problems Maternal Grandfather     No Known Problems Paternal Grandmother     No Known Problems Paternal Grandfather     Breast cancer Sister     Prostate cancer Brother        Social History:  reports that she has never smoked. She has never used smokeless tobacco. She reports previous alcohol use. She reports that she does not use drugs.    Review of Systemas  Constitutional: No change in weight, appetie, fatigue, fever, or night sweats  Eyes: No changes in vision  Ears, Nose, Mouth, Throat, and Face: No hearing problems, ear pain, rummy nose, or sore throat  Respiratory: No shortness of breath or cough  Cardiovascular: No chest pain, palpitations or dizziness  Gastrointestinal: No abdominal pain, hematochezia, melena  Genitourinary: No dysuria, hematuria, nocturia, menstrual problems, post menopausal  Integumentary/Breast: No rashes or itching  Hematologic/Lymphatic: No anemia or bleeding abnormalities  Musculoskeletal: No joint or muscle abnormalities  Neurological: No sensory or motor problems, no headaches  Behavioral/Psych: No depression, anxiety, cognitive problems, or stress  Endocrine: No diabetes or thyroid problems  Allergic/Immunologic: See allergy above    Physical Exam      Vitals:   Vitals:    11/05/20 1049   BP: (!) 190/95   BP Location: Left arm   Patient Position: Sitting   BP Method: Medium  "(Automatic)   Pulse: 84   Resp: 16   Temp: 97.2 °F (36.2 °C)   TempSrc: Temporal   SpO2: 98%   Weight: 69.1 kg (152 lb 6.4 oz)   Height: 5' 3" (1.6 m)     BMI: Body mass index is 27 kg/m².  BSA Body surface area is 1.75 meters squared.  ECOG Performance Status:   ECOG SCORE    1 - Restricted in strenuous activity-ambulatory and able to carry out work of a light nature      ECOG 1    GENERAL APPEARANCE: Well developed, well nourished, in no acute distress.  SKIN: Inspection of the skin reveals no rashes, ulcerations or petechiae.  HEENT: The sclerae were anicteric and conjunctivae were pink and moist. Extraocular movements were intact and pupils were equal, round, and reactive to light with normal accommodation. External inspection of the ears and nose showed no scars, lesions, or masses. Lips, teeth, and gums showed normal mucosa. The oral mucosa, hard and soft palate, tongue and posterior pharynx were normal.  NECK: Supple and symmetric. There was no thyroid enlargement, and no tenderness, or masses were felt.  CRESPIRATORY: Normal AP diameter and normal contour without any kyphoscoliosis. LUNGS: Auscultation of the lungs revealed normal breath sounds without any other adventitious sounds or rubs.  CARDIOVASCULAR: There was a regular rate and rhythm without any murmurs, gallops, rubs. The carotid pulses were normal and 2+ bilaterally without bruits. Peripheral pulses were 2+ and symmetric.  GASTROINTESTINAL: Soft and nontender with normal bowel sounds. The liver span was approximately 5-6 cm in the right midclavicular line by percussion. The liver edge was nontender. The spleen was not palpable. There were no inguinal or umbilical hernias noted. No ascites was noted.  LYMPH NODES: No lymphadenopathy was appreciated in the neck, axillae or groin.  MUSCULOSKELETAL: Gait was normal. There was no tenderness or effusions noted. Muscle strength and tone were normal. EXTREMITIES: No cyanosis, clubbing or " edema.  NEUROLOGIC: Alert and oriented x 3. Normal affect. Gait was normal. Normal deep tendon reflexes with no pathological reflexes. Sensation to touch was normal.  PSYCHIATRIC: good mood, orientated to place, time and person     Labs and Imagings     Orders Only on 2020   Component Date Value Ref Range Status    POC Glucose 2020 130* 70 - 105 Final    POC Test Performed   -  Diabetic Fastin-180 mg/dl   Orders Only on 2020   Component Date Value Ref Range Status    POC Glucose 2020 139* 70 - 105 Final    POC Test Performed   -  Diabetic Fastin-180 mg/dl   Orders Only on 07/15/2020   Component Date Value Ref Range Status    COVID-19 Ab, IgM 07/15/2020 Negative  Negative Final    IgM usually appears within 3-5 days of symptoms.  Negativeresults do not rule out SARS-CoV-2 infection, particularlyin those who have been in contact with the virus.    Antibody SARS CoV-2 07/15/2020 Negative  Negative Final    Comment: IgG usually appears approximately 10 days after symptomsThis test has been approved by the FDA under and EmergencyUse Authorization (EUA).  Results from antibody testingshould not be used as the sole basis to diagnose or [id number] infection or to   inform infection status. Testresults should be correlated with clinical presentation ofpatient.     Orders Only on 07/15/2020   Component Date Value Ref Range Status    Sodium 07/15/2020 136  135 - 145 mmol/L Final    Potassium 07/15/2020 4.2  3.6 - 5.2 mmol/L Final    Chloride 07/15/2020 97* 100 - 108 mmol/L Final    CO2 07/15/2020 33* 21 - 32 mmol/L Final    Anion Gap 07/15/2020 6.0  3.0 - 11.0 mmol/L Final    BUN 07/15/2020 16  7 - 18 mg/dL Final    Creatinine 07/15/2020 1.07* 0.55 - 1.02 mg/dL Final    GFR ESTIMATION 07/15/2020 59* >60 Final               DESCRIPTION       GLOMERULAR FILTRATION RATE             NORMAL                     >60             KIDNEY  DISEASE           15-60             KIDNEY  FAILURE             <15    BUN/Creatinine Ratio 07/15/2020 14.95  7 - 18 Ratio Final    Glucose 07/15/2020 204* 70 - 110 mg/dL Final    Calcium 07/15/2020 9.6  8.8 - 10.5 mg/dL Final   Orders Only on 07/15/2020   Component Date Value Ref Range Status    Specimen Collection Method 07/15/2020 Cl Catch Mid Stream   Final    Color, UA 07/15/2020 Pale Yellow  Yellow Final    Appearance, UA 07/15/2020 Clear  Clear Final    pH, UA 07/15/2020 6.5  5.0 - 9.0 pH Final    Specific Gravity, UA 07/15/2020 1.010  1.000 - 1.030 Final    Protein, UA 07/15/2020 Negative  Negative mg/dL Final    Glucose, UA 07/15/2020 Normal  Normal mg/dL Final    Ketones, UA 07/15/2020 Negative  Negative mg/dL Final    Occult Blood UA 07/15/2020 10* Negative /uL Final    Nitrite, UA 07/15/2020 Negative  Negative Final    Bilirubin (UA) 07/15/2020 Negative  Negative mg/dL Final    Urobilinogen, UA 07/15/2020 Normal  Normal mg/dL Final    Leukocytes, UA 07/15/2020 25* Negative /uL Final    RBC, UA 07/15/2020 Rare  0 - 2 /HPF Final    WBC, UA 07/15/2020 0-2  0 - 5 /HPF Final    Squam Epithel, UA 07/15/2020 1+ Few  /LPF Final    Bacteria 07/15/2020 1+ Few  Few/HPF /HPF Final    Service Comment 03 07/15/2020 No, Criteria Not Met   Final   Orders Only on 07/15/2020   Component Date Value Ref Range Status    Neutrophils 07/15/2020 69.0  37 - 80 % Final    Neutrophils Absolute 07/15/2020 2.1  1.8 - 7.7 10*3/uL Final    Lymphocytes 07/15/2020 23.0* 25 - 40 % Final    Monocytes 07/15/2020 6.0  1 - 15 % Final    Eosinophils 07/15/2020 2.0  1 - 3 % Final    Cells Counted 07/15/2020 100   Final    Platelet Estimate 07/15/2020 Adequate   Final    RBC Morphology 07/15/2020 N/N   Final    WBC 07/15/2020 3.1* 4.6 - 10.2 10*3/uL Final    RBC 07/15/2020 4.00* 4.2 - 5.4 10*6/uL Final    Hemoglobin 07/15/2020 11.5* 12.0 - 16.0 g/dL Final    Hematocrit 07/15/2020 35.5* 37.0 - 47.0 % Final    MCV 07/15/2020 88.8  80 - 97 fL Final    MCH  07/15/2020 28.8  27.0 - 31.2 pg Final    MCHC 07/15/2020 32.4  31.8 - 35.4 g/dL Final    RDW RBC Auto-Rto 07/15/2020 13.9  12.5 - 18.0 % Final    Platelets 07/15/2020 176  142 - 424 10*3/uL Final    Manual nRBC per 100 Cells 07/15/2020 0.0  % Final   Procedure visit on 07/06/2020   Component Date Value Ref Range Status    POC Glucose, Urine 07/06/2020 Negative  Negative Final    POC Bilirubin, Urine 07/06/2020 Negative  Negative Final    POC Ketones, Urine 07/06/2020 Negative  Negative Final    POC Specific Gravity, Urine 07/06/2020 1.015  1.003 - 1.029 Final    POC Blood, Urine 07/06/2020 Negative  Negative Final    pH, UA 07/06/2020 7.0   Final    POC Protein, Urine 07/06/2020 Negative  Negative Final    POC Urobilinogen, Urine 07/06/2020 1.0  0.1 - 1.1 Final    POC Nitrates, Urine 07/06/2020 Negative  Negative Final    POC Leukocytes, Urine 07/06/2020 Negative  Negative Final    POC HYALIN, URINE 07/06/2020 0  lpf Final     All cell laboratory reviewed which shows white cell 1.7 and sodium 126  Imaging:     Assessment:     Principle Problem: Malignant neoplasm of urinary organ, unspecified [C68.9]   Co-morbidity/Active Problems:   Patient Active Problem List   Diagnosis    Malignant neoplasm of urinary bladder    Essential hypertension    Type 2 diabetes mellitus without complication, without long-term current use of insulin    Encounter for antineoplastic chemotherapy    Chemotherapy-induced nausea    Chemotherapy-induced neutropenia    Absolute anemia        Sherice Haji is a 88 y.o. female with PMH of The encounter diagnosis was Malignant neoplasm of urinary organ, unspecified., with Malignant neoplasm of urinary organ, unspecified [C68.9]   Sherice Haji is a 87 y.o. 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 Diagnoses of Essential hypertension and Type 2 diabetes mellitus without complication, without  long-term current use of insulin were also pertinent to this visit., with Malignant 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 [C67.3]   87-year-old female otherwise healthy except hypertension diabetes present with bladder mass biopsy-proven muscle invasive bladder cancer( stage II) and     biopsy-proven muscle invasive bladder cancer( stage II) recurrence locally  Normal renal function  Neutropenia  Status post chemo radiation therapy December 2019  recurrent muscle invasive bladder cancer    Plan:  Status post   Overall Plan:  Concurrent chemoradiation therapy, followed by optional cysctemy and full dose chemo C/G; patient does not want chemotherapy so the other option is immunotherapy    ==Labs CBC CMP q. 3 weeks  ==Repeat Urology and consider repeat cystoscope every 3 months to evaluate response   == start immunotherapy  == CT scan for restaging prior to immunotherapy  ==rtc 4 weeks but ready for chemo next week, signed    Signature    Misael Gordon M.D., Ph.D., [facility]  Hematology-Oncology    Signed 11/5/2020 9:47 AM   Note Ends Here  See  dAalid Valera MD [facility] [id number] [phone]    Impression:        1.  There is some wall thickening along the left anterior aspect of the   bladder unchanged. Constipation. Prior surgeries. Densely calcified coronary   arteries. No acute changes.        DICTATING PHYSICIAN:  BEATRICE RUDOLPH MD                   Date Dictated: 11/30/20 9728

## 2020-11-11 ENCOUNTER — OFFICE VISIT (OUTPATIENT)
Dept: HEMATOLOGY/ONCOLOGY | Facility: CLINIC | Age: 85
End: 2020-11-11
Payer: COMMERCIAL

## 2020-11-11 VITALS
WEIGHT: 154 LBS | OXYGEN SATURATION: 95 % | HEART RATE: 86 BPM | SYSTOLIC BLOOD PRESSURE: 200 MMHG | RESPIRATION RATE: 14 BRPM | HEIGHT: 63 IN | DIASTOLIC BLOOD PRESSURE: 97 MMHG | TEMPERATURE: 98 F | BODY MASS INDEX: 27.29 KG/M2

## 2020-11-11 DIAGNOSIS — Z29.89 ENCOUNTER FOR IMMUNOTHERAPY: Primary | ICD-10-CM

## 2020-11-11 DIAGNOSIS — C67.9 MALIGNANT NEOPLASM OF URINARY BLADDER, UNSPECIFIED SITE: ICD-10-CM

## 2020-11-11 DIAGNOSIS — E03.9 HYPOTHYROIDISM, UNSPECIFIED TYPE: ICD-10-CM

## 2020-11-11 LAB
ALBUMIN SERPL BCP-MCNC: 3.6 G/DL (ref 3.4–5)
ALBUMIN/GLOBULIN RATIO: 1.12 RATIO (ref 1.1–1.8)
ALP SERPL-CCNC: 46 U/L (ref 46–116)
ALT SERPL W P-5'-P-CCNC: 25 U/L (ref 12–78)
ANION GAP SERPL CALC-SCNC: 4 MMOL/L (ref 3–11)
AST SERPL-CCNC: 14 U/L (ref 15–37)
BASOPHILS NFR BLD: 0.5 % (ref 0–3)
BILIRUB SERPL-MCNC: 0.3 MG/DL (ref 0–1)
BUN SERPL-MCNC: 14 MG/DL (ref 7–18)
BUN/CREAT SERPL: 13.08 RATIO (ref 7–18)
CALCIUM SERPL-MCNC: 9.5 MG/DL (ref 8.8–10.5)
CHLORIDE SERPL-SCNC: 98 MMOL/L (ref 100–108)
CO2 SERPL-SCNC: 32 MMOL/L (ref 21–32)
CREAT SERPL-MCNC: 1.07 MG/DL (ref 0.55–1.02)
EOSINOPHIL NFR BLD: 4.3 % (ref 1–3)
ERYTHROCYTE [DISTWIDTH] IN BLOOD BY AUTOMATED COUNT: 13.9 % (ref 12.5–18)
GFR ESTIMATION: 59
GLOBULIN: 3.2 G/DL (ref 2.3–3.5)
GLUCOSE SERPL-MCNC: 150 MG/DL (ref 70–110)
HCT VFR BLD AUTO: 36.1 % (ref 37–47)
HGB BLD-MCNC: 11.6 G/DL (ref 12–16)
LYMPHOCYTES NFR BLD: 17 % (ref 25–40)
MCH RBC QN AUTO: 28.4 PG (ref 27–31.2)
MCHC RBC AUTO-ENTMCNC: 32.1 G/DL (ref 31.8–35.4)
MCV RBC AUTO: 88.5 FL (ref 80–97)
MONOCYTES NFR BLD: 12.7 % (ref 1–15)
NEUTROPHILS # BLD AUTO: 2.41 10*3/UL (ref 1.8–7.7)
NEUTROPHILS NFR BLD: 65.2 % (ref 37–80)
NUCLEATED RED BLOOD CELLS: 0 %
PLATELETS: 205 10*3/UL (ref 142–424)
POTASSIUM SERPL-SCNC: 4.3 MMOL/L (ref 3.6–5.2)
PROT SERPL-MCNC: 6.8 G/DL (ref 6.4–8.2)
RBC # BLD AUTO: 4.08 10*6/UL (ref 4.2–5.4)
SODIUM BLD-SCNC: 134 MMOL/L (ref 135–145)
TSH SERPL DL<=0.005 MIU/L-ACNC: 0.59 UIU/ML (ref 0.36–3.74)
WBC # BLD: 3.7 10*3/UL (ref 4.6–10.2)

## 2020-11-11 PROCEDURE — 1126F AMNT PAIN NOTED NONE PRSNT: CPT | Mod: S$GLB,,, | Performed by: NURSE PRACTITIONER

## 2020-11-11 PROCEDURE — 1101F PR PT FALLS ASSESS DOC 0-1 FALLS W/OUT INJ PAST YR: ICD-10-PCS | Mod: CPTII,S$GLB,, | Performed by: NURSE PRACTITIONER

## 2020-11-11 PROCEDURE — 1126F PR PAIN SEVERITY QUANTIFIED, NO PAIN PRESENT: ICD-10-PCS | Mod: S$GLB,,, | Performed by: NURSE PRACTITIONER

## 2020-11-11 PROCEDURE — 1101F PT FALLS ASSESS-DOCD LE1/YR: CPT | Mod: CPTII,S$GLB,, | Performed by: NURSE PRACTITIONER

## 2020-11-11 PROCEDURE — 99214 OFFICE O/P EST MOD 30 MIN: CPT | Mod: S$GLB,,, | Performed by: NURSE PRACTITIONER

## 2020-11-11 PROCEDURE — 1159F PR MEDICATION LIST DOCUMENTED IN MEDICAL RECORD: ICD-10-PCS | Mod: S$GLB,,, | Performed by: NURSE PRACTITIONER

## 2020-11-11 PROCEDURE — 1159F MED LIST DOCD IN RCRD: CPT | Mod: S$GLB,,, | Performed by: NURSE PRACTITIONER

## 2020-11-11 PROCEDURE — 99214 PR OFFICE/OUTPT VISIT, EST, LEVL IV, 30-39 MIN: ICD-10-PCS | Mod: S$GLB,,, | Performed by: NURSE PRACTITIONER

## 2020-11-11 RX ORDER — SODIUM CHLORIDE 0.9 % (FLUSH) 0.9 %
10 SYRINGE (ML) INJECTION
Status: CANCELLED | OUTPATIENT
Start: 2020-11-11

## 2020-11-11 RX ORDER — HEPARIN 100 UNIT/ML
500 SYRINGE INTRAVENOUS
Status: CANCELLED | OUTPATIENT
Start: 2020-11-11

## 2020-11-11 NOTE — PROGRESS NOTES
Hematology Oncology Progress Note    Hematology Oncology  [facility]     PATIENT: Sherice Haji  : 1932 88 y.o. female  MRN [id number]     PCP: Sejal Castillo MD  Consult Requested By:  No att. providers found    Date of Service: 2020    Subjective:   Interim History:  Malignant neoplasm of urinary organ    The patient doing well without any treatment in the past several months; she had no hematuria;  She is ready for starting immunotherapy     Oncology History:    Sherice Haji is here for to followup for bladder cancer.  Current therapy patient doing well without new complaints she specifically denies hematuria or weight loss she has a good appetite.  This is a 87 y.o. female patient with a history of The primary encounter diagnosis was Malignant 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. Diagnoses of Essential hypertension and Type 2 diabetes mellitus without complication, without long-term current use of insulin were also pertinent to this visit., who is referred here for evaluation treatment of bladder cancer.  The patient has been feeling weak and for T for about 2 months.  Urinalysis shows hematuria or and she was referred to Urology.  CT scan with IV contrast demonstrates a bladder mass. Biopsy shows muscle invasive bladder cancer.     The patient lost about 10 lb over the past 2 months.  She denies any abdominal pain chest pain short of breath.     ==2019 completed concurrent chemoradiation therapy as a bladder preservation  ==2020 Cyst NEO in bladder  ==2020 discussed a chemotherapy options such as cisplatin gemcitabine as a 20 % dose reduction; she declined chemotherapy  ==2020 recurrence muscle invasive bladder cancer on cystoscope  ==2020 we discussed options for her; for those chemotherapy with cisplatin gemcitabine versus immunotherapy with ago to induce local response and hopefully bladder preservation as a salvage; she prefer immunotherapy  ==  she was displaced by JANIE Aparicio  ==11/2020 Keytruda       Oncology History   Malignant neoplasm of urinary bladder   9/25/2019 Initial Diagnosis    Malignant 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     10/21/2019 Cancer Staged    Staging form: Urinary Bladder, AJCC 8th Edition  - Clinical: Stage II (cT2, cN0, cM0)     10/31/2019 - 12/11/2019 Chemotherapy    Treatment Summary   Plan Name: cisplatin taxol weekly  Treatment Goal: Curative  Status: Inactive  Start Date: 10/31/2019  End Date: 12/5/2019  Provider: Hoda Josue NP  Chemotherapy: CISplatin (PLATINOL) 20 mg/m2 = 34 mg in sodium chloride 0.9% 500 mL chemo infusion, 20 mg/m2 = 34 mg (100 % of original dose 20 mg/m2), Intravenous, Clinic/HOD 1 time, 6 of 6 cycles  Dose modification: 20 mg/m2 (original dose 20 mg/m2, Cycle 1)  PACLitaxel (TAXOL) 50 mg/m2 = 84 mg in sodium chloride 0.9% 500 mL chemo infusion, 50 mg/m2 = 84 mg (100 % of original dose 50 mg/m2), Intravenous, Clinic/HOD 1 time, 6 of 6 cycles  Dose modification: 50 mg/m2 (original dose 50 mg/m2, Cycle 1)     6/24/2020 - 6/24/2020 Chemotherapy    Treatment Summary   Plan Name: OP BLADDER GEMCITABINE CISPLATIN (SPLIT DOSE CISPLATIN) Q3W  Treatment Goal: Curative  Status: Inactive  Start Date:   End Date:   Provider: Misael Gordon MD  Chemotherapy: CISplatin (PLATINOL) 35 mg/m2 = 62 mg in sodium chloride 0.9% 500 mL chemo infusion, 35 mg/m2, Intravenous, Clinic/HOD 1 time, 0 of 4 cycles  gemcitabine (GEMZAR) 1,780 mg in sodium chloride 0.9% 250 mL chemo infusion, 1,000 mg/m2, Intravenous, Clinic/HOD 1 time, 0 of 4 cycles     11/11/2020 -  Chemotherapy    Treatment Summary   Plan Name: OP PEMBROLIZUMAB 200MG Q3W  Treatment Goal: Control  Status: Active  Start Date: 11/11/2020 (Planned)  End Date: 2/24/2021 (Planned)  Provider: Misael Gordon MD  Chemotherapy: pembrolizumab (KEYTRUDA) 200 mg in sodium chloride 0.9% 100 mL chemo infusion, 200 mg, Intravenous, Clinic/HOD 1 time, 0 of 6 cycles    "      Oncologic History:      Oncologic History     Oncologic Treatment     Pathology      Past Medical History:   Past Medical History:   Diagnosis Date    Absolute anemia 12/5/2019    Allergy     Anxiety     Arthritis     Bladder cancer     Cataract     Removed in May 2019    Depression     Diabetes mellitus     Hypertension     Malignant 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 9/25/2019    Type 2 diabetes mellitus without complication, without long-term current use of insulin 9/25/2019       Past Surgical HIstory:   Past Surgical History:   Procedure Laterality Date    BLADDER SURGERY      surgery on July 22 2020 to see if patients cancer was still present    BLADDER TUMOR EXCISION  09/2019    CATARACT EXTRACTION, BILATERAL  05/2019       Allergies:  Review of patient's allergies indicates:   Allergen Reactions    Aspirin        Medications:  Current Outpatient Medications   Medication Sig Dispense Refill    ACCU-CHEK FASTCLIX LANCET DRUM Misc       ACCU-CHEK SMARTVIEW CONTRL SOL Soln       ACCU-CHEK SMARTVIEW TEST STRIP Strp       BD ALCOHOL SWABS PadM       benzonatate (TESSALON) 200 MG capsule       diphenoxylate-atropine 2.5-0.025 mg (LOMOTIL) 2.5-0.025 mg per tablet       DROPLET PEN NEEDLE 32 gauge x 5/32" Ndle       glimepiride (AMARYL) 2 MG tablet       HYDROcodone-acetaminophen (NORCO) 5-325 mg per tablet       hyoscyamine (LEVSIN/SL) 0.125 mg Subl       indapamide (LOZOL) 2.5 MG Tab       LANTUS SOLOSTAR U-100 INSULIN glargine 100 units/mL (3mL) SubQ pen       levothyroxine (SYNTHROID) 75 MCG tablet       loratadine (CLARITIN) 10 mg tablet       losartan (COZAAR) 25 MG tablet       meclizine (ANTIVERT) 12.5 mg tablet       meloxicam (MOBIC) 7.5 MG tablet       metFORMIN (GLUCOPHAGE-XR) 500 MG 24 hr tablet       metoprolol tartrate (LOPRESSOR) 50 MG tablet       simvastatin (ZOCOR) 20 MG tablet       traMADoL (ULTRAM) 50 mg tablet       traZODone (DESYREL) 50 " "MG tablet        No current facility-administered medications for this visit.        Family History:   Family History   Problem Relation Age of Onset    No Known Problems Mother     No Known Problems Father     Breast cancer Sister     Prostate cancer Brother     No Known Problems Maternal Aunt     No Known Problems Maternal Uncle     No Known Problems Paternal Aunt     No Known Problems Paternal Uncle     No Known Problems Maternal Grandmother     No Known Problems Maternal Grandfather     No Known Problems Paternal Grandmother     No Known Problems Paternal Grandfather     Breast cancer Sister     Prostate cancer Brother        Social History:  reports that she has never smoked. She has never used smokeless tobacco. She reports previous alcohol use. She reports that she does not use drugs.    Review of Systemas  Constitutional: No change in weight, appetie, fatigue, fever, or night sweats  Eyes: No changes in vision  Ears, Nose, Mouth, Throat, and Face: No hearing problems, ear pain, rummy nose, or sore throat  Respiratory: No shortness of breath or cough  Cardiovascular: No chest pain, palpitations or dizziness  Gastrointestinal: No abdominal pain, hematochezia, melena  Genitourinary: No dysuria, hematuria, nocturia, menstrual problems, post menopausal  Integumentary/Breast: No rashes or itching  Hematologic/Lymphatic: No anemia or bleeding abnormalities  Musculoskeletal: No joint or muscle abnormalities  Neurological: No sensory or motor problems, no headaches  Behavioral/Psych: No depression, anxiety, cognitive problems, or stress  Endocrine: No diabetes or thyroid problems  Allergic/Immunologic: See allergy above    Physical Exam      Vitals:   Vitals:    11/11/20 0923   BP: (!) 200/97   BP Location: Right arm   Patient Position: Sitting   BP Method: Large (Automatic)   Pulse: 86   Resp: 14   Temp: 98.2 °F (36.8 °C)   TempSrc: Temporal   SpO2: 95%   Weight: 69.9 kg (154 lb)   Height: 5' 3" (1.6 " m)     BMI: Body mass index is 27.28 kg/m².  BSA Body surface area is 1.76 meters squared.  ECOG Performance Status:   ECOG SCORE        ECOG 1    GENERAL APPEARANCE: Well developed, well nourished, in no acute distress.  SKIN: Inspection of the skin reveals no rashes, ulcerations or petechiae.  HEENT: The sclerae were anicteric and conjunctivae were pink and moist. Extraocular movements were intact and pupils were equal, round, and reactive to light with normal accommodation. External inspection of the ears and nose showed no scars, lesions, or masses. Lips, teeth, and gums showed normal mucosa. The oral mucosa, hard and soft palate, tongue and posterior pharynx were normal.  NECK: Supple and symmetric. There was no thyroid enlargement, and no tenderness, or masses were felt.  CRESPIRATORY: Normal AP diameter and normal contour without any kyphoscoliosis. LUNGS: Auscultation of the lungs revealed normal breath sounds without any other adventitious sounds or rubs.  CARDIOVASCULAR: There was a regular rate and rhythm without any murmurs, gallops, rubs. The carotid pulses were normal and 2+ bilaterally without bruits. Peripheral pulses were 2+ and symmetric.  GASTROINTESTINAL: Soft and nontender with normal bowel sounds. The liver span was approximately 5-6 cm in the right midclavicular line by percussion. The liver edge was nontender. The spleen was not palpable. There were no inguinal or umbilical hernias noted. No ascites was noted.  LYMPH NODES: No lymphadenopathy was appreciated in the neck, axillae or groin.  MUSCULOSKELETAL: Gait was normal. There was no tenderness or effusions noted. Muscle strength and tone were normal. EXTREMITIES: No cyanosis, clubbing or edema.  NEUROLOGIC: Alert and oriented x 3. Normal affect. Gait was normal. Normal deep tendon reflexes with no pathological reflexes. Sensation to touch was normal.  PSYCHIATRIC: good mood, orientated to place, time and person     Labs and Imagings      Lab Results   Component Value Date    WBC 3.7 (L) 11/11/2020    HGB 11.6 (L) 11/11/2020    HCT 36.1 (L) 11/11/2020    LABPLAT 205 11/11/2020       CMP  Sodium   Date Value Ref Range Status   11/11/2020 134 (L) 135 - 145 mmol/L Final   10/14/2019 133 (L) 136 - 145 mmol/L Final     Potassium   Date Value Ref Range Status   11/11/2020 4.3 3.6 - 5.2 mmol/L Final   10/14/2019 4.4 3.5 - 5.1 mmol/L Final     Chloride   Date Value Ref Range Status   11/11/2020 98 (L) 100 - 108 mmol/L Final   10/14/2019 97 (L) 98 - 107 mmol/L Final     CO2   Date Value Ref Range Status   11/11/2020 32 21 - 32 mmol/L Final   10/14/2019 28 22 - 29 mmol/L Final     Glucose   Date Value Ref Range Status   11/11/2020 150 (H) 70 - 110 mg/dL Final     BUN   Date Value Ref Range Status   11/11/2020 14 7 - 18 mg/dL Final   10/14/2019 15.8 8 - 23 mg/dL Final     Creatinine   Date Value Ref Range Status   11/11/2020 1.07 (H) 0.55 - 1.02 mg/dL Final   10/14/2019 0.74 0.50 - 0.90 mg/dL Final     Calcium   Date Value Ref Range Status   11/11/2020 9.5 8.8 - 10.5 mg/dL Final   10/14/2019 9.9 8.6 - 10.2 mg/dL Final     Total Protein   Date Value Ref Range Status   11/11/2020 6.8 6.4 - 8.2 g/dL Final     Albumin   Date Value Ref Range Status   11/11/2020 3.6 3.4 - 5.0 g/dL Final   10/14/2019 4.3 3.5 - 5.2 g/dL Final     Total Bilirubin   Date Value Ref Range Status   11/11/2020 0.3 0.0 - 1.0 mg/dL Final     Alkaline Phosphatase   Date Value Ref Range Status   11/11/2020 46 46 - 116 U/L Final     AST   Date Value Ref Range Status   11/11/2020 14 (L) 15 - 37 U/L Final   10/14/2019 11 0 - 32 U/L Final     ALT   Date Value Ref Range Status   11/11/2020 25 12 - 78 U/L Final     Anion Gap   Date Value Ref Range Status   11/11/2020 4.0 3.0 - 11.0 mmol/L Final   10/14/2019 8.0 8.0 - 17.0 mmol/L Final     Comment:     NOTE  Testing performed at:  [facility], [address] [place] [address]  [id number] CLIA #:[id number]         Imaging:     Assessment:      Principle Problem: Encounter for immunotherapy [Z29.8]   Co-morbidity/Active Problems:   Patient Active Problem List   Diagnosis    Malignant neoplasm of urinary bladder    Essential hypertension    Type 2 diabetes mellitus without complication, without long-term current use of insulin    Encounter for antineoplastic chemotherapy    Chemotherapy-induced nausea    Chemotherapy-induced neutropenia    Absolute anemia        Sherice Haji is a 88 y.o. female with PMH of The primary encounter diagnosis was Encounter for immunotherapy. Diagnoses of Hypothyroidism, unspecified type and Malignant neoplasm of urinary bladder, unspecified site were also pertinent to this visit., with Encounter for immunotherapy [Z29.8]   Sherice Haji is a 87 y.o. 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 [C67.3]   87-year-old female otherwise healthy except hypertension diabetes present with bladder mass biopsy-proven muscle invasive bladder cancer( stage II) and     biopsy-proven muscle invasive bladder cancer( stage II) recurrence locally  Normal renal function  Neutropenia  Status post chemo radiation therapy December 2019  recurrent muscle invasive bladder cancer    Plan:  Status post   Overall Plan:  Concurrent chemoradiation therapy, followed by optional cysctemy and full dose chemo C/G; patient does not want chemotherapy so the other option is immunotherapy    ==Labs CBC CMP q. 3 weeks  ==Repeat Urology and consider repeat cystoscope every 3 months to evaluate response   == start immunotherapy today, chemo education provide, consents signed, all questions answered.  == CT scan for restaging prior to immunotherapy, pt did not  receive call to dio. Will proceed with cycle 1 IO and dio pt for ct scans in the next 1-2 weeks  ==rtc3 weeks with labs, plan to do Q3W dosing x 2 and if glenn well, will increase to 6W.    Bella Josue NP

## 2020-11-30 ENCOUNTER — TELEPHONE (OUTPATIENT)
Dept: HEMATOLOGY/ONCOLOGY | Facility: CLINIC | Age: 85
End: 2020-11-30

## 2020-12-03 ENCOUNTER — OFFICE VISIT (OUTPATIENT)
Dept: HEMATOLOGY/ONCOLOGY | Facility: CLINIC | Age: 85
End: 2020-12-03
Payer: COMMERCIAL

## 2020-12-03 VITALS
BODY MASS INDEX: 27.36 KG/M2 | SYSTOLIC BLOOD PRESSURE: 184 MMHG | DIASTOLIC BLOOD PRESSURE: 85 MMHG | TEMPERATURE: 97 F | RESPIRATION RATE: 18 BRPM | WEIGHT: 154.38 LBS | HEIGHT: 63 IN | OXYGEN SATURATION: 95 % | HEART RATE: 97 BPM

## 2020-12-03 DIAGNOSIS — C67.9 MALIGNANT NEOPLASM OF URINARY BLADDER, UNSPECIFIED SITE: Primary | ICD-10-CM

## 2020-12-03 DIAGNOSIS — Z29.89 ENCOUNTER FOR IMMUNOTHERAPY: ICD-10-CM

## 2020-12-03 LAB
ALBUMIN SERPL BCP-MCNC: 3.6 G/DL (ref 3.4–5)
ALBUMIN/GLOBULIN RATIO: 1.09 RATIO (ref 1.1–1.8)
ALP SERPL-CCNC: 51 U/L (ref 46–116)
ALT SERPL W P-5'-P-CCNC: 19 U/L (ref 12–78)
ANION GAP SERPL CALC-SCNC: 5 MMOL/L (ref 3–11)
AST SERPL-CCNC: 11 U/L (ref 15–37)
BILIRUB SERPL-MCNC: 0.4 MG/DL (ref 0–1)
BUN SERPL-MCNC: 19 MG/DL (ref 7–18)
BUN/CREAT SERPL: 16.66 RATIO (ref 7–18)
CALCIUM SERPL-MCNC: 9.6 MG/DL (ref 8.8–10.5)
CELLS COUNTED: 100
CHLORIDE SERPL-SCNC: 96 MMOL/L (ref 100–108)
CO2 SERPL-SCNC: 32 MMOL/L (ref 21–32)
CREAT SERPL-MCNC: 1.14 MG/DL (ref 0.55–1.02)
EOSINOPHIL NFR BLD: 11 % (ref 1–3)
ERYTHROCYTE [DISTWIDTH] IN BLOOD BY AUTOMATED COUNT: 13.3 % (ref 12.5–18)
GFR ESTIMATION: 55
GLOBULIN: 3.3 G/DL (ref 2.3–3.5)
GLUCOSE SERPL-MCNC: 193 MG/DL (ref 70–110)
HCT VFR BLD AUTO: 37 % (ref 37–47)
HGB BLD-MCNC: 11.7 G/DL (ref 12–16)
LYMPHOCYTES NFR BLD: 14 % (ref 25–40)
MCH RBC QN AUTO: 28.2 PG (ref 27–31.2)
MCHC RBC AUTO-ENTMCNC: 31.6 G/DL (ref 31.8–35.4)
MCV RBC AUTO: 89.2 FL (ref 80–97)
MONOCYTES NFR BLD: 10 % (ref 1–15)
NEUTROPHILS # BLD AUTO: 2.5 10*3/UL (ref 1.8–7.7)
NEUTROPHILS NFR BLD: 65 % (ref 37–80)
NUCLEATED RED BLOOD CELLS: 0 %
PLATELETS: 206 10*3/UL (ref 142–424)
POTASSIUM SERPL-SCNC: 4.2 MMOL/L (ref 3.6–5.2)
PROT SERPL-MCNC: 6.9 G/DL (ref 6.4–8.2)
RBC # BLD AUTO: 4.15 10*6/UL (ref 4.2–5.4)
RBC MORPH BLD: ABNORMAL
SMALL PLATELETS BLD QL SMEAR: NORMAL
SODIUM BLD-SCNC: 133 MMOL/L (ref 135–145)
TSH SERPL DL<=0.005 MIU/L-ACNC: 0.61 UIU/ML (ref 0.36–3.74)
WBC # BLD: 3.9 10*3/UL (ref 4.6–10.2)

## 2020-12-03 PROCEDURE — 1101F PT FALLS ASSESS-DOCD LE1/YR: CPT | Mod: CPTII,S$GLB,, | Performed by: INTERNAL MEDICINE

## 2020-12-03 PROCEDURE — 3288F FALL RISK ASSESSMENT DOCD: CPT | Mod: CPTII,S$GLB,, | Performed by: INTERNAL MEDICINE

## 2020-12-03 PROCEDURE — 99215 OFFICE O/P EST HI 40 MIN: CPT | Mod: S$GLB,,, | Performed by: INTERNAL MEDICINE

## 2020-12-03 PROCEDURE — 3288F PR FALLS RISK ASSESSMENT DOCUMENTED: ICD-10-PCS | Mod: CPTII,S$GLB,, | Performed by: INTERNAL MEDICINE

## 2020-12-03 PROCEDURE — 1159F MED LIST DOCD IN RCRD: CPT | Mod: S$GLB,,, | Performed by: INTERNAL MEDICINE

## 2020-12-03 PROCEDURE — 99215 PR OFFICE/OUTPT VISIT, EST, LEVL V, 40-54 MIN: ICD-10-PCS | Mod: S$GLB,,, | Performed by: INTERNAL MEDICINE

## 2020-12-03 PROCEDURE — 1126F AMNT PAIN NOTED NONE PRSNT: CPT | Mod: S$GLB,,, | Performed by: INTERNAL MEDICINE

## 2020-12-03 PROCEDURE — 1126F PR PAIN SEVERITY QUANTIFIED, NO PAIN PRESENT: ICD-10-PCS | Mod: S$GLB,,, | Performed by: INTERNAL MEDICINE

## 2020-12-03 PROCEDURE — 1101F PR PT FALLS ASSESS DOC 0-1 FALLS W/OUT INJ PAST YR: ICD-10-PCS | Mod: CPTII,S$GLB,, | Performed by: INTERNAL MEDICINE

## 2020-12-03 PROCEDURE — 1159F PR MEDICATION LIST DOCUMENTED IN MEDICAL RECORD: ICD-10-PCS | Mod: S$GLB,,, | Performed by: INTERNAL MEDICINE

## 2020-12-03 RX ORDER — HEPARIN 100 UNIT/ML
500 SYRINGE INTRAVENOUS
Status: CANCELLED | OUTPATIENT
Start: 2020-12-03

## 2020-12-03 RX ORDER — AMOXICILLIN 500 MG/1
500 CAPSULE ORAL EVERY 8 HOURS
Qty: 30 CAPSULE | Refills: 0 | Status: SHIPPED | OUTPATIENT
Start: 2020-12-03 | End: 2020-12-13

## 2020-12-03 RX ORDER — SODIUM CHLORIDE 0.9 % (FLUSH) 0.9 %
10 SYRINGE (ML) INJECTION
Status: CANCELLED | OUTPATIENT
Start: 2020-12-03

## 2020-12-03 NOTE — PROGRESS NOTES
Hematology Oncology Progress Note    Hematology Oncology  [facility]     PATIENT: Sherice Haji  : 1932 88 y.o. female  MRN [id number]     PCP: Sejal Castillo MD  Consult Requested By:  No att. providers found    Date of Service: 12/3/2020    Subjective:   Interim History:  Malignant neoplasm of urinary organ, unspecified    The patient doing well except sneezing for several days; she had no fever chills; she denies any history of exposure to COVID-19     Oncology History:    Sherice Haji is here for to followup for bladder cancer.  Current therapy patient doing well without new complaints she specifically denies hematuria or weight loss she has a good appetite.  This is a 87 y.o. female patient with a history of The primary encounter diagnosis was Malignant 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. Diagnoses of Essential hypertension and Type 2 diabetes mellitus without complication, without long-term current use of insulin were also pertinent to this visit., who is referred here for evaluation treatment of bladder cancer.  The patient has been feeling weak and for T for about 2 months.  Urinalysis shows hematuria or and she was referred to Urology.  CT scan with IV contrast demonstrates a bladder mass. Biopsy shows muscle invasive bladder cancer.     The patient lost about 10 lb over the past 2 months.  She denies any abdominal pain chest pain short of breath.     ==2019 completed concurrent chemoradiation therapy as a bladder preservation  ==2020 Cyst NEO in bladder  ==2020 discussed a chemotherapy options such as cisplatin gemcitabine as a 20 % dose reduction; she declined chemotherapy  ==2020 recurrence muscle invasive bladder cancer on cystoscope  ==2020 we discussed options for her; for those chemotherapy with cisplatin gemcitabine versus immunotherapy with ago to induce local response and hopefully bladder preservation as a salvage; she prefer  immunotherapy  == she was displaced by JANIE Aparicio  ==11/30/20  CT There is some wall thickening along the left anterior aspect of the bladder unchanged. Constipation. Prior surgeries. Densely calcified coronary arteries.     ==11/2020 Keytruda restarted       Oncology History   Malignant neoplasm of urinary bladder   9/25/2019 Initial Diagnosis    Malignant 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     10/21/2019 Cancer Staged    Staging form: Urinary Bladder, AJCC 8th Edition  - Clinical: Stage II (cT2, cN0, cM0)     10/31/2019 - 12/11/2019 Chemotherapy    Treatment Summary   Plan Name: cisplatin taxol weekly  Treatment Goal: Curative  Status: Inactive  Start Date: 10/31/2019  End Date: 12/5/2019  Provider: Hoda Josue NP  Chemotherapy: CISplatin (PLATINOL) 20 mg/m2 = 34 mg in sodium chloride 0.9% 500 mL chemo infusion, 20 mg/m2 = 34 mg (100 % of original dose 20 mg/m2), Intravenous, Clinic/HOD 1 time, 6 of 6 cycles  Dose modification: 20 mg/m2 (original dose 20 mg/m2, Cycle 1)  PACLitaxel (TAXOL) 50 mg/m2 = 84 mg in sodium chloride 0.9% 500 mL chemo infusion, 50 mg/m2 = 84 mg (100 % of original dose 50 mg/m2), Intravenous, Clinic/HOD 1 time, 6 of 6 cycles  Dose modification: 50 mg/m2 (original dose 50 mg/m2, Cycle 1)     6/24/2020 - 6/24/2020 Chemotherapy    Treatment Summary   Plan Name: OP BLADDER GEMCITABINE CISPLATIN (SPLIT DOSE CISPLATIN) Q3W  Treatment Goal: Curative  Status: Inactive  Start Date:   End Date:   Provider: Misael Gordon MD  Chemotherapy: CISplatin (PLATINOL) 35 mg/m2 = 62 mg in sodium chloride 0.9% 500 mL chemo infusion, 35 mg/m2, Intravenous, Clinic/HOD 1 time, 0 of 4 cycles  gemcitabine (GEMZAR) 1,780 mg in sodium chloride 0.9% 250 mL chemo infusion, 1,000 mg/m2, Intravenous, Clinic/HOD 1 time, 0 of 4 cycles     11/11/2020 -  Chemotherapy    Treatment Summary   Plan Name: OP PEMBROLIZUMAB 200MG Q3W  Treatment Goal: Control  Status: Active  Start Date: 11/11/2020  End Date: 2/24/2021  "(Planned)  Provider: Misael Gordon MD  Chemotherapy: pembrolizumab (KEYTRUDA) 200 mg in sodium chloride 0.9% 100 mL chemo infusion, 200 mg, Intravenous, Clinic/HOD 1 time, 1 of 6 cycles         Oncologic History:      Oncologic History     Oncologic Treatment     Pathology      Past Medical History:   Past Medical History:   Diagnosis Date    Absolute anemia 12/5/2019    Allergy     Anxiety     Arthritis     Bladder cancer     Cataract     Removed in May 2019    Depression     Diabetes mellitus     Hypertension     Malignant 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 9/25/2019    Type 2 diabetes mellitus without complication, without long-term current use of insulin 9/25/2019       Past Surgical HIstory:   Past Surgical History:   Procedure Laterality Date    BLADDER SURGERY      surgery on July 22 2020 to see if patients cancer was still present    BLADDER TUMOR EXCISION  09/2019    CATARACT EXTRACTION, BILATERAL  05/2019       Allergies:  Review of patient's allergies indicates:   Allergen Reactions    Aspirin        Medications:  Current Outpatient Medications   Medication Sig Dispense Refill    ACCU-CHEK FASTCLIX LANCET DRUM Misc       ACCU-CHEK SMARTVIEW CONTRL SOL Soln       ACCU-CHEK SMARTVIEW TEST STRIP Strp       BD ALCOHOL SWABS PadM       benzonatate (TESSALON) 200 MG capsule       diphenoxylate-atropine 2.5-0.025 mg (LOMOTIL) 2.5-0.025 mg per tablet       DROPLET PEN NEEDLE 32 gauge x 5/32" Ndle       glimepiride (AMARYL) 2 MG tablet       HYDROcodone-acetaminophen (NORCO) 5-325 mg per tablet       hyoscyamine (LEVSIN/SL) 0.125 mg Subl       indapamide (LOZOL) 2.5 MG Tab       LANTUS SOLOSTAR U-100 INSULIN glargine 100 units/mL (3mL) SubQ pen       levothyroxine (SYNTHROID) 75 MCG tablet       loratadine (CLARITIN) 10 mg tablet       losartan (COZAAR) 25 MG tablet       meclizine (ANTIVERT) 12.5 mg tablet       meloxicam (MOBIC) 7.5 MG tablet       metFORMIN " (GLUCOPHAGE-XR) 500 MG 24 hr tablet       metoprolol tartrate (LOPRESSOR) 50 MG tablet       simvastatin (ZOCOR) 20 MG tablet       traMADoL (ULTRAM) 50 mg tablet       traZODone (DESYREL) 50 MG tablet        No current facility-administered medications for this visit.        Family History:   Family History   Problem Relation Age of Onset    No Known Problems Mother     No Known Problems Father     Breast cancer Sister     Prostate cancer Brother     No Known Problems Maternal Aunt     No Known Problems Maternal Uncle     No Known Problems Paternal Aunt     No Known Problems Paternal Uncle     No Known Problems Maternal Grandmother     No Known Problems Maternal Grandfather     No Known Problems Paternal Grandmother     No Known Problems Paternal Grandfather     Breast cancer Sister     Prostate cancer Brother        Social History:  reports that she has never smoked. She has never used smokeless tobacco. She reports previous alcohol use. She reports that she does not use drugs.    Review of Systemas  Constitutional: No change in weight, appetie, fatigue, fever, or night sweats  Eyes: No changes in vision  Ears, Nose, Mouth, Throat, and Face: No hearing problems, ear pain, rummy nose, or sore throat  Respiratory: No shortness of breath or cough  Cardiovascular: No chest pain, palpitations or dizziness  Gastrointestinal: No abdominal pain, hematochezia, melena  Genitourinary: No dysuria, hematuria, nocturia, menstrual problems, post menopausal  Integumentary/Breast: No rashes or itching  Hematologic/Lymphatic: No anemia or bleeding abnormalities  Musculoskeletal: No joint or muscle abnormalities  Neurological: No sensory or motor problems, no headaches  Behavioral/Psych: No depression, anxiety, cognitive problems, or stress  Endocrine: No diabetes or thyroid problems  Allergic/Immunologic: See allergy above    Physical Exam      Vitals:   Vitals:    12/03/20 0949   BP: (!) 184/85   BP Location:  "Left arm   Patient Position: Sitting   BP Method: Medium (Automatic)   Pulse: 97   Resp: 18   Temp: 97 °F (36.1 °C)   TempSrc: Temporal   SpO2: 95%   Weight: 70 kg (154 lb 6.4 oz)   Height: 5' 3" (1.6 m)     BMI: Body mass index is 27.35 kg/m².  BSA Body surface area is 1.76 meters squared.  ECOG Performance Status:   ECOG SCORE    1 - Restricted in strenuous activity-ambulatory and able to carry out work of a light nature      ECOG 1    GENERAL APPEARANCE: Well developed, well nourished, in no acute distress.  SKIN: Inspection of the skin reveals no rashes, ulcerations or petechiae.  HEENT: The sclerae were anicteric and conjunctivae were pink and moist. Extraocular movements were intact and pupils were equal, round, and reactive to light with normal accommodation. External inspection of the ears and nose showed no scars, lesions, or masses. Lips, teeth, and gums showed normal mucosa. The oral mucosa, hard and soft palate, tongue and posterior pharynx were normal.  NECK: Supple and symmetric. There was no thyroid enlargement, and no tenderness, or masses were felt.  CRESPIRATORY: Normal AP diameter and normal contour without any kyphoscoliosis. LUNGS: Auscultation of the lungs revealed normal breath sounds without any other adventitious sounds or rubs.  CARDIOVASCULAR: There was a regular rate and rhythm without any murmurs, gallops, rubs. The carotid pulses were normal and 2+ bilaterally without bruits. Peripheral pulses were 2+ and symmetric.  GASTROINTESTINAL: Soft and nontender with normal bowel sounds. The liver span was approximately 5-6 cm in the right midclavicular line by percussion. The liver edge was nontender. The spleen was not palpable. There were no inguinal or umbilical hernias noted. No ascites was noted.  LYMPH NODES: No lymphadenopathy was appreciated in the neck, axillae or groin.  MUSCULOSKELETAL: Gait was normal. There was no tenderness or effusions noted. Muscle strength and tone were normal. " EXTREMITIES: No cyanosis, clubbing or edema.  NEUROLOGIC: Alert and oriented x 3. Normal affect. Gait was normal. Normal deep tendon reflexes with no pathological reflexes. Sensation to touch was normal.  PSYCHIATRIC: good mood, orientated to place, time and person     Labs and Imagings     Office Visit on 11/11/2020   Component Date Value Ref Range Status    TSH 11/11/2020 0.59  0.36 - 3.74 uIU/mL Final   Office Visit on 11/05/2020   Component Date Value Ref Range Status    WBC 11/11/2020 3.7* 4.6 - 10.2 10*3/uL Final    RBC 11/11/2020 4.08* 4.2 - 5.4 10*6/uL Final    Hemoglobin 11/11/2020 11.6* 12.0 - 16.0 g/dL Final    Hematocrit 11/11/2020 36.1* 37.0 - 47.0 % Final    MCV 11/11/2020 88.5  80 - 97 fL Final    MCH 11/11/2020 28.4  27.0 - 31.2 pg Final    MCHC 11/11/2020 32.1  31.8 - 35.4 g/dL Final    RDW RBC Auto-Rto 11/11/2020 13.9  12.5 - 18.0 % Final    Platelets 11/11/2020 205  142 - 424 10*3/uL Final    Neutrophils 11/11/2020 65.2  37 - 80 % Final    Lymphocytes 11/11/2020 17.0* 25 - 40 % Final    Monocytes 11/11/2020 12.7  1 - 15 % Final    Eosinophils 11/11/2020 4.3* 1 - 3 % Final    Basophils 11/11/2020 0.5  0 - 3 % Final    nRBC# 11/11/2020 0.0  % Final    Neutrophils Absolute 11/11/2020 2.41  1.8 - 7.7 10*3/uL Final    Sodium 11/11/2020 134* 135 - 145 mmol/L Final    Potassium 11/11/2020 4.3  3.6 - 5.2 mmol/L Final    Chloride 11/11/2020 98* 100 - 108 mmol/L Final    CO2 11/11/2020 32  21 - 32 mmol/L Final    Anion Gap 11/11/2020 4.0  3.0 - 11.0 mmol/L Final    BUN 11/11/2020 14  7 - 18 mg/dL Final    Creatinine 11/11/2020 1.07* 0.55 - 1.02 mg/dL Final    GFR ESTIMATION 11/11/2020 59* >60 Final               DESCRIPTION       GLOMERULAR FILTRATION RATE             NORMAL                     >60             KIDNEY  DISEASE           15-60             KIDNEY FAILURE             <15    BUN/Creatinine Ratio 11/11/2020 13.08  7 - 18 Ratio Final    Glucose 11/11/2020 150* 70 - 110  mg/dL Final    Calcium 2020 9.5  8.8 - 10.5 mg/dL Final    Total Bilirubin 2020 0.3  0.0 - 1.0 mg/dL Final    AST 2020 14* 15 - 37 U/L Final    ALT 2020 25  12 - 78 U/L Final    Total Protein 2020 6.8  6.4 - 8.2 g/dL Final    Albumin 2020 3.6  3.4 - 5.0 g/dL Final    Globulin 2020 3.2  2.3 - 3.5 g/dL Final    Albumin/Globulin Ratio 2020 1.125  1.1 - 1.8 Ratio Final    Alkaline Phosphatase 2020 46  46 - 116 U/L Final   Orders Only on 2020   Component Date Value Ref Range Status    POC Glucose 2020 130* 70 - 105 Final    POC Test Performed   -  Diabetic Fastin-180 mg/dl   Orders Only on 2020   Component Date Value Ref Range Status    POC Glucose 2020 139* 70 - 105 Final    POC Test Performed   -  Diabetic Fastin-180 mg/dl   Orders Only on 07/15/2020   Component Date Value Ref Range Status    COVID-19 Ab, IgM 07/15/2020 Negative  Negative Final    IgM usually appears within 3-5 days of symptoms.  Negativeresults do not rule out SARS-CoV-2 infection, particularlyin those who have been in contact with the virus.    Antibody SARS CoV-2 07/15/2020 Negative  Negative Final    Comment: IgG usually appears approximately 10 days after symptomsThis test has been approved by the FDA under and EmergencyUse Authorization (EUA).  Results from antibody testingshould not be used as the sole basis to diagnose or [id number] infection or to   inform infection status. Testresults should be correlated with clinical presentation ofpatient.     Orders Only on 07/15/2020   Component Date Value Ref Range Status    Sodium 07/15/2020 136  135 - 145 mmol/L Final    Potassium 07/15/2020 4.2  3.6 - 5.2 mmol/L Final    Chloride 07/15/2020 97* 100 - 108 mmol/L Final    CO2 07/15/2020 33* 21 - 32 mmol/L Final    Anion Gap 07/15/2020 6.0  3.0 - 11.0 mmol/L Final    BUN 07/15/2020 16  7 - 18 mg/dL Final    Creatinine 07/15/2020 1.07*  0.55 - 1.02 mg/dL Final    GFR ESTIMATION 07/15/2020 59* >60 Final               DESCRIPTION       GLOMERULAR FILTRATION RATE             NORMAL                     >60             KIDNEY  DISEASE           15-60             KIDNEY FAILURE             <15    BUN/Creatinine Ratio 07/15/2020 14.95  7 - 18 Ratio Final    Glucose 07/15/2020 204* 70 - 110 mg/dL Final    Calcium 07/15/2020 9.6  8.8 - 10.5 mg/dL Final   Orders Only on 07/15/2020   Component Date Value Ref Range Status    Specimen Collection Method 07/15/2020 Cl Catch Mid Stream   Final    Color, UA 07/15/2020 Pale Yellow  Yellow Final    Appearance, UA 07/15/2020 Clear  Clear Final    pH, UA 07/15/2020 6.5  5.0 - 9.0 pH Final    Specific Gravity, UA 07/15/2020 1.010  1.000 - 1.030 Final    Protein, UA 07/15/2020 Negative  Negative mg/dL Final    Glucose, UA 07/15/2020 Normal  Normal mg/dL Final    Ketones, UA 07/15/2020 Negative  Negative mg/dL Final    Occult Blood UA 07/15/2020 10* Negative /uL Final    Nitrite, UA 07/15/2020 Negative  Negative Final    Bilirubin (UA) 07/15/2020 Negative  Negative mg/dL Final    Urobilinogen, UA 07/15/2020 Normal  Normal mg/dL Final    Leukocytes, UA 07/15/2020 25* Negative /uL Final    RBC, UA 07/15/2020 Rare  0 - 2 /HPF Final    WBC, UA 07/15/2020 0-2  0 - 5 /HPF Final    Squam Epithel, UA 07/15/2020 1+ Few  /LPF Final    Bacteria 07/15/2020 1+ Few  Few/HPF /HPF Final    Service Comment 03 07/15/2020 No, Criteria Not Met   Final   Orders Only on 07/15/2020   Component Date Value Ref Range Status    Neutrophils 07/15/2020 69.0  37 - 80 % Final    Neutrophils Absolute 07/15/2020 2.1  1.8 - 7.7 10*3/uL Final    Lymphocytes 07/15/2020 23.0* 25 - 40 % Final    Monocytes 07/15/2020 6.0  1 - 15 % Final    Eosinophils 07/15/2020 2.0  1 - 3 % Final    Cells Counted 07/15/2020 100   Final    Platelet Estimate 07/15/2020 Adequate   Final    RBC Morphology 07/15/2020 N/N   Final    WBC 07/15/2020  3.1* 4.6 - 10.2 10*3/uL Final    RBC 07/15/2020 4.00* 4.2 - 5.4 10*6/uL Final    Hemoglobin 07/15/2020 11.5* 12.0 - 16.0 g/dL Final    Hematocrit 07/15/2020 35.5* 37.0 - 47.0 % Final    MCV 07/15/2020 88.8  80 - 97 fL Final    MCH 07/15/2020 28.8  27.0 - 31.2 pg Final    MCHC 07/15/2020 32.4  31.8 - 35.4 g/dL Final    RDW RBC Auto-Rto 07/15/2020 13.9  12.5 - 18.0 % Final    Platelets 07/15/2020 176  142 - 424 10*3/uL Final    Manual nRBC per 100 Cells 07/15/2020 0.0  % Final   Procedure visit on 07/06/2020   Component Date Value Ref Range Status    POC Glucose, Urine 07/06/2020 Negative  Negative Final    POC Bilirubin, Urine 07/06/2020 Negative  Negative Final    POC Ketones, Urine 07/06/2020 Negative  Negative Final    POC Specific Gravity, Urine 07/06/2020 1.015  1.003 - 1.029 Final    POC Blood, Urine 07/06/2020 Negative  Negative Final    pH, UA 07/06/2020 7.0   Final    POC Protein, Urine 07/06/2020 Negative  Negative Final    POC Urobilinogen, Urine 07/06/2020 1.0  0.1 - 1.1 Final    POC Nitrates, Urine 07/06/2020 Negative  Negative Final    POC Leukocytes, Urine 07/06/2020 Negative  Negative Final    POC HYALIN, URINE 07/06/2020 0  lpf Final     All cell laboratory reviewed which shows white cell 1.7 and sodium 126  Imaging:     Assessment:     Principle Problem: Malignant neoplasm of urinary bladder, unspecified site [C67.9]   Co-morbidity/Active Problems:   Patient Active Problem List   Diagnosis    Malignant neoplasm of urinary bladder    Essential hypertension    Type 2 diabetes mellitus without complication, without long-term current use of insulin    Encounter for antineoplastic chemotherapy    Chemotherapy-induced nausea    Chemotherapy-induced neutropenia    Absolute anemia        Sherice Haji is a 88 y.o. female with PMH of The primary encounter diagnosis was Malignant neoplasm of urinary bladder, unspecified site. A diagnosis of Encounter for immunotherapy was also  pertinent to this visit., with Malignant neoplasm of urinary bladder, unspecified site [C67.9]   Sherice Haji is a 87 y.o. 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 [C67.3]   87-year-old female otherwise healthy except hypertension diabetes present with bladder mass biopsy-proven muscle invasive bladder cancer( stage II) and     biopsy-proven muscle invasive bladder cancer( stage II) recurrence locally  Normal renal function  Neutropenia  Status post chemo radiation therapy December 2019  recurrent muscle invasive bladder cancer    Plan:  Status post   Overall Plan:  Concurrent chemoradiation therapy, followed by optional cysctemy and full dose chemo C/G; patient does not want chemotherapy so the other option is immunotherapy    ==Labs CBC CMP q. 3 weeks  ==Cont #2 immunotherapy Keytruda  ==rtc 3 weeks   ==Repeat Urology and consider repeat cystoscope every 3 months to evaluate response /Adalid Valera MD [facility] [place] [id number] [phone]    Signature    Misael Gordon M.D., Ph.D., [facility]  Hematology-Oncology    Signed 12/3/2020 9:47 AM   Note Ends Here

## 2020-12-11 NOTE — TELEPHONE ENCOUNTER
Spoke with pt and she wanted to clarify that she was having biopsies done and not just a pictures (scope) nurse verbalized that RODERICK was doing biopsies on 7/22/20 but that she had to come to the clinic to sign consents on 7/15/20. Pt verbalized understanding. NB      ----- Message from Abbie Tse sent at 7/7/2020  2:07 PM CDT -----  Contact: SANDOVAL GUZMÁN [[id number]]  Type:  Needs Medical Advice    Who Called: pt  Symptoms (please be specific):    How long has patient had these symptoms:   Pharmacy name and phone #:    Would the patient rather a call back or a response via [facility]? Call back  Best Call Back Number: [phone]  Additional Information: Caller is calling in regards to her appointments // pt has some questions          
5

## 2020-12-28 ENCOUNTER — OFFICE VISIT (OUTPATIENT)
Dept: HEMATOLOGY/ONCOLOGY | Facility: CLINIC | Age: 85
End: 2020-12-28
Payer: COMMERCIAL

## 2020-12-28 VITALS
SYSTOLIC BLOOD PRESSURE: 187 MMHG | HEIGHT: 63 IN | BODY MASS INDEX: 27.75 KG/M2 | TEMPERATURE: 99 F | DIASTOLIC BLOOD PRESSURE: 106 MMHG | WEIGHT: 156.63 LBS | HEART RATE: 88 BPM | OXYGEN SATURATION: 94 % | RESPIRATION RATE: 14 BRPM

## 2020-12-28 DIAGNOSIS — C67.3 MALIGNANT NEOPLASM OF ANTERIOR WALL OF URINARY BLADDER: Primary | ICD-10-CM

## 2020-12-28 LAB
ALBUMIN SERPL BCP-MCNC: 3.7 G/DL (ref 3.4–5)
ALBUMIN/GLOBULIN RATIO: 1.06 RATIO (ref 1.1–1.8)
ALP SERPL-CCNC: 48 U/L (ref 46–116)
ALT SERPL W P-5'-P-CCNC: 24 U/L (ref 12–78)
ANION GAP SERPL CALC-SCNC: 4 MMOL/L (ref 3–11)
AST SERPL-CCNC: 17 U/L (ref 15–37)
BANDS: 2 % (ref 0–5)
BILIRUB SERPL-MCNC: 0.3 MG/DL (ref 0–1)
BUN SERPL-MCNC: 13 MG/DL (ref 7–18)
BUN/CREAT SERPL: 13.68 RATIO (ref 7–18)
CALCIUM SERPL-MCNC: 9.6 MG/DL (ref 8.8–10.5)
CELLS COUNTED: 100
CHLORIDE SERPL-SCNC: 98 MMOL/L (ref 100–108)
CO2 SERPL-SCNC: 34 MMOL/L (ref 21–32)
CREAT SERPL-MCNC: 0.95 MG/DL (ref 0.55–1.02)
ERYTHROCYTE [DISTWIDTH] IN BLOOD BY AUTOMATED COUNT: 13.6 % (ref 12.5–18)
GFR ESTIMATION: > 60
GLOBULIN: 3.5 G/DL (ref 2.3–3.5)
GLUCOSE SERPL-MCNC: 177 MG/DL (ref 70–110)
HCT VFR BLD AUTO: 37.2 % (ref 37–47)
HGB BLD-MCNC: 12.1 G/DL (ref 12–16)
LYMPHOCYTES NFR BLD: 20 % (ref 25–40)
MCH RBC QN AUTO: 28.9 PG (ref 27–31.2)
MCHC RBC AUTO-ENTMCNC: 32.5 G/DL (ref 31.8–35.4)
MCV RBC AUTO: 89 FL (ref 80–97)
MONOCYTES NFR BLD: 19 % (ref 1–15)
NEUTROPHILS # BLD AUTO: 2.1 10*3/UL (ref 1.8–7.7)
NEUTROPHILS NFR BLD: 59 % (ref 37–80)
NUCLEATED RED BLOOD CELLS: 0 %
PLATELETS: 208 10*3/UL (ref 142–424)
POTASSIUM SERPL-SCNC: 4.5 MMOL/L (ref 3.6–5.2)
PROT SERPL-MCNC: 7.2 G/DL (ref 6.4–8.2)
RBC # BLD AUTO: 4.18 10*6/UL (ref 4.2–5.4)
RBC MORPH BLD: ABNORMAL
SMALL PLATELETS BLD QL SMEAR: ADEQUATE
SODIUM BLD-SCNC: 136 MMOL/L (ref 135–145)
WBC # BLD: 3.4 10*3/UL (ref 4.6–10.2)

## 2020-12-28 PROCEDURE — 99214 PR OFFICE/OUTPT VISIT, EST, LEVL IV, 30-39 MIN: ICD-10-PCS | Mod: S$GLB,,, | Performed by: INTERNAL MEDICINE

## 2020-12-28 PROCEDURE — 1101F PT FALLS ASSESS-DOCD LE1/YR: CPT | Mod: CPTII,S$GLB,, | Performed by: INTERNAL MEDICINE

## 2020-12-28 PROCEDURE — 3288F PR FALLS RISK ASSESSMENT DOCUMENTED: ICD-10-PCS | Mod: CPTII,S$GLB,, | Performed by: INTERNAL MEDICINE

## 2020-12-28 PROCEDURE — 1159F MED LIST DOCD IN RCRD: CPT | Mod: S$GLB,,, | Performed by: INTERNAL MEDICINE

## 2020-12-28 PROCEDURE — 1126F PR PAIN SEVERITY QUANTIFIED, NO PAIN PRESENT: ICD-10-PCS | Mod: S$GLB,,, | Performed by: INTERNAL MEDICINE

## 2020-12-28 PROCEDURE — 3288F FALL RISK ASSESSMENT DOCD: CPT | Mod: CPTII,S$GLB,, | Performed by: INTERNAL MEDICINE

## 2020-12-28 PROCEDURE — 1159F PR MEDICATION LIST DOCUMENTED IN MEDICAL RECORD: ICD-10-PCS | Mod: S$GLB,,, | Performed by: INTERNAL MEDICINE

## 2020-12-28 PROCEDURE — 1101F PR PT FALLS ASSESS DOC 0-1 FALLS W/OUT INJ PAST YR: ICD-10-PCS | Mod: CPTII,S$GLB,, | Performed by: INTERNAL MEDICINE

## 2020-12-28 PROCEDURE — 99214 OFFICE O/P EST MOD 30 MIN: CPT | Mod: S$GLB,,, | Performed by: INTERNAL MEDICINE

## 2020-12-28 PROCEDURE — 1126F AMNT PAIN NOTED NONE PRSNT: CPT | Mod: S$GLB,,, | Performed by: INTERNAL MEDICINE

## 2020-12-28 RX ORDER — HEPARIN 100 UNIT/ML
500 SYRINGE INTRAVENOUS
Status: CANCELLED | OUTPATIENT
Start: 2020-12-28

## 2020-12-28 RX ORDER — SODIUM CHLORIDE 0.9 % (FLUSH) 0.9 %
10 SYRINGE (ML) INJECTION
Status: CANCELLED | OUTPATIENT
Start: 2020-12-28

## 2020-12-28 NOTE — PROGRESS NOTES
Hematology Oncology Progress Note    Hematology Oncology  [facility]     PATIENT: Sherice Haji  : 1932 88 y.o. female  MRN [id number]     PCP: Sejal Castillo MD  Consult Requested By:  No att. providers found    Date of Service: 2020    Subjective:   Interim History:  Malignant neoplasm of urinary bladder    The patient doing well except sneezing for several days; she had no fever chills; she denies any history of exposure to COVID-19     Oncology History:    Sherice Haji is here for to followup for bladder cancer.  Current therapy patient doing well without new complaints she specifically denies hematuria or weight loss she has a good appetite.  This is a 87 y.o. female patient with a history of The primary encounter diagnosis was Malignant 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. Diagnoses of Essential hypertension and Type 2 diabetes mellitus without complication, without long-term current use of insulin were also pertinent to this visit., who is referred here for evaluation treatment of bladder cancer.  The patient has been feeling weak and for T for about 2 months.  Urinalysis shows hematuria or and she was referred to Urology.  CT scan with IV contrast demonstrates a bladder mass. Biopsy shows muscle invasive bladder cancer.     The patient lost about 10 lb over the past 2 months.  She denies any abdominal pain chest pain short of breath.     ==2019 completed concurrent chemoradiation therapy as a bladder preservation  ==2020 Cyst NEO in bladder  ==2020 discussed a chemotherapy options such as cisplatin gemcitabine as a 20 % dose reduction; she declined chemotherapy  ==2020 recurrence muscle invasive bladder cancer on cystoscope  ==2020 we discussed options for her; for those chemotherapy with cisplatin gemcitabine versus immunotherapy with ago to induce local response and hopefully bladder preservation as a salvage; she prefer immunotherapy  == she  was displaced by H Alessandra  ==11/30/20  CT There is some wall thickening along the left anterior aspect of the bladder unchanged. Constipation. Prior surgeries. Densely calcified coronary arteries.     ==11/2020 Keytruda restarted       Oncology History   Malignant neoplasm of urinary bladder   9/25/2019 Initial Diagnosis    Malignant 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     10/21/2019 Cancer Staged    Staging form: Urinary Bladder, AJCC 8th Edition  - Clinical: Stage II (cT2, cN0, cM0)     10/31/2019 - 12/11/2019 Chemotherapy    Treatment Summary   Plan Name: cisplatin taxol weekly  Treatment Goal: Curative  Status: Inactive  Start Date: 10/31/2019  End Date: 12/5/2019  Provider: Hoda Josue NP  Chemotherapy: CISplatin (PLATINOL) 20 mg/m2 = 34 mg in sodium chloride 0.9% 500 mL chemo infusion, 20 mg/m2 = 34 mg (100 % of original dose 20 mg/m2), Intravenous, Clinic/HOD 1 time, 6 of 6 cycles  Dose modification: 20 mg/m2 (original dose 20 mg/m2, Cycle 1)  PACLitaxel (TAXOL) 50 mg/m2 = 84 mg in sodium chloride 0.9% 500 mL chemo infusion, 50 mg/m2 = 84 mg (100 % of original dose 50 mg/m2), Intravenous, Clinic/HOD 1 time, 6 of 6 cycles  Dose modification: 50 mg/m2 (original dose 50 mg/m2, Cycle 1)     6/24/2020 - 6/24/2020 Chemotherapy    Treatment Summary   Plan Name: OP BLADDER GEMCITABINE CISPLATIN (SPLIT DOSE CISPLATIN) Q3W  Treatment Goal: Curative  Status: Inactive  Start Date:   End Date:   Provider: Misael Gordon MD  Chemotherapy: CISplatin (PLATINOL) 35 mg/m2 = 62 mg in sodium chloride 0.9% 500 mL chemo infusion, 35 mg/m2, Intravenous, Clinic/HOD 1 time, 0 of 4 cycles  gemcitabine (GEMZAR) 1,780 mg in sodium chloride 0.9% 250 mL chemo infusion, 1,000 mg/m2, Intravenous, Clinic/HOD 1 time, 0 of 4 cycles     11/11/2020 -  Chemotherapy    Treatment Summary   Plan Name: OP PEMBROLIZUMAB 200MG Q3W  Treatment Goal: Control  Status: Active  Start Date: 11/11/2020  End Date: 3/1/2021 (Planned)  Provider: Misael MCALLISTER  "MD Evan  Chemotherapy: pembrolizumab (KEYTRUDA) 200 mg in sodium chloride 0.9% 100 mL chemo infusion, 200 mg, Intravenous, Clinic/HOD 1 time, 3 of 6 cycles         Oncologic History:      Oncologic History     Oncologic Treatment     Pathology      Past Medical History:   Past Medical History:   Diagnosis Date    Absolute anemia 12/5/2019    Allergy     Anxiety     Arthritis     Bladder cancer     Cataract     Removed in May 2019    Depression     Diabetes mellitus     Hypertension     Malignant 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 9/25/2019    Type 2 diabetes mellitus without complication, without long-term current use of insulin 9/25/2019       Past Surgical HIstory:   Past Surgical History:   Procedure Laterality Date    BLADDER SURGERY      surgery on July 22 2020 to see if patients cancer was still present    BLADDER TUMOR EXCISION  09/2019    CATARACT EXTRACTION, BILATERAL  05/2019       Allergies:  Review of patient's allergies indicates:   Allergen Reactions    Aspirin        Medications:  Current Outpatient Medications   Medication Sig Dispense Refill    ACCU-CHEK FASTCLIX LANCET DRUM Misc       ACCU-CHEK SMARTVIEW CONTRL SOL Soln       ACCU-CHEK SMARTVIEW TEST STRIP Strp       BD ALCOHOL SWABS PadM       benzonatate (TESSALON) 200 MG capsule       diphenoxylate-atropine 2.5-0.025 mg (LOMOTIL) 2.5-0.025 mg per tablet       DROPLET PEN NEEDLE 32 gauge x 5/32" Ndle       glimepiride (AMARYL) 2 MG tablet       HYDROcodone-acetaminophen (NORCO) 5-325 mg per tablet       hyoscyamine (LEVSIN/SL) 0.125 mg Subl       indapamide (LOZOL) 2.5 MG Tab       LANTUS SOLOSTAR U-100 INSULIN glargine 100 units/mL (3mL) SubQ pen       levothyroxine (SYNTHROID) 75 MCG tablet       loratadine (CLARITIN) 10 mg tablet       losartan (COZAAR) 25 MG tablet       meclizine (ANTIVERT) 12.5 mg tablet       meloxicam (MOBIC) 7.5 MG tablet       metFORMIN (GLUCOPHAGE-XR) 500 MG 24 hr tablet  "      metoprolol tartrate (LOPRESSOR) 50 MG tablet       simvastatin (ZOCOR) 20 MG tablet       traMADoL (ULTRAM) 50 mg tablet       traZODone (DESYREL) 50 MG tablet        No current facility-administered medications for this visit.        Family History:   Family History   Problem Relation Age of Onset    No Known Problems Mother     No Known Problems Father     Breast cancer Sister     Prostate cancer Brother     No Known Problems Maternal Aunt     No Known Problems Maternal Uncle     No Known Problems Paternal Aunt     No Known Problems Paternal Uncle     No Known Problems Maternal Grandmother     No Known Problems Maternal Grandfather     No Known Problems Paternal Grandmother     No Known Problems Paternal Grandfather     Breast cancer Sister     Prostate cancer Brother        Social History:  reports that she has never smoked. She has never used smokeless tobacco. She reports previous alcohol use. She reports that she does not use drugs.    Review of Systemas  Constitutional: No change in weight, appetie, fatigue, fever, or night sweats  Eyes: No changes in vision  Ears, Nose, Mouth, Throat, and Face: No hearing problems, ear pain, rummy nose, or sore throat  Respiratory: No shortness of breath or cough  Cardiovascular: No chest pain, palpitations or dizziness  Gastrointestinal: No abdominal pain, hematochezia, melena  Genitourinary: No dysuria, hematuria, nocturia, menstrual problems, post menopausal  Integumentary/Breast: No rashes or itching  Hematologic/Lymphatic: No anemia or bleeding abnormalities  Musculoskeletal: No joint or muscle abnormalities  Neurological: No sensory or motor problems, no headaches  Behavioral/Psych: No depression, anxiety, cognitive problems, or stress  Endocrine: No diabetes or thyroid problems  Allergic/Immunologic: See allergy above    Physical Exam      Vitals:   Vitals:    12/28/20 0959   BP: (!) 187/106   BP Location: Right arm   Patient Position: Sitting  "  BP Method: Large (Automatic)   Pulse: 88   Resp: 14   Temp: 98.6 °F (37 °C)   TempSrc: Temporal   SpO2: (!) 94%   Weight: 71 kg (156 lb 9.6 oz)   Height: 5' 3" (1.6 m)     BMI: Body mass index is 27.74 kg/m².  BSA Body surface area is 1.78 meters squared.  ECOG Performance Status:   ECOG SCORE        ECOG 1    GENERAL APPEARANCE: Well developed, well nourished, in no acute distress.  SKIN: Inspection of the skin reveals no rashes, ulcerations or petechiae.  HEENT: The sclerae were anicteric and conjunctivae were pink and moist. Extraocular movements were intact and pupils were equal, round, and reactive to light with normal accommodation. External inspection of the ears and nose showed no scars, lesions, or masses. Lips, teeth, and gums showed normal mucosa. The oral mucosa, hard and soft palate, tongue and posterior pharynx were normal.  NECK: Supple and symmetric. There was no thyroid enlargement, and no tenderness, or masses were felt.  CRESPIRATORY: Normal AP diameter and normal contour without any kyphoscoliosis. LUNGS: Auscultation of the lungs revealed normal breath sounds without any other adventitious sounds or rubs.  CARDIOVASCULAR: There was a regular rate and rhythm without any murmurs, gallops, rubs. The carotid pulses were normal and 2+ bilaterally without bruits. Peripheral pulses were 2+ and symmetric.  GASTROINTESTINAL: Soft and nontender with normal bowel sounds. The liver span was approximately 5-6 cm in the right midclavicular line by percussion. The liver edge was nontender. The spleen was not palpable. There were no inguinal or umbilical hernias noted. No ascites was noted.  LYMPH NODES: No lymphadenopathy was appreciated in the neck, axillae or groin.  MUSCULOSKELETAL: Gait was normal. There was no tenderness or effusions noted. Muscle strength and tone were normal. EXTREMITIES: No cyanosis, clubbing or edema.  NEUROLOGIC: Alert and oriented x 3. Normal affect. Gait was normal. Normal deep " tendon reflexes with no pathological reflexes. Sensation to touch was normal.  PSYCHIATRIC: good mood, orientated to place, time and person     Labs and Imagings     Office Visit on 12/28/2020   Component Date Value Ref Range Status    WBC 12/28/2020 3.4* 4.6 - 10.2 10*3/uL Final    RBC 12/28/2020 4.18* 4.2 - 5.4 10*6/uL Final    Hemoglobin 12/28/2020 12.1  12.0 - 16.0 g/dL Final    Hematocrit 12/28/2020 37.2  37.0 - 47.0 % Final    MCV 12/28/2020 89.0  80 - 97 fL Final    MCH 12/28/2020 28.9  27.0 - 31.2 pg Final    MCHC 12/28/2020 32.5  31.8 - 35.4 g/dL Final    RDW RBC Auto-Rto 12/28/2020 13.6  12.5 - 18.0 % Final    Platelets 12/28/2020 208  142 - 424 10*3/uL Final    nRBC# 12/28/2020 0.0  % Final    Sodium 12/28/2020 136  135 - 145 mmol/L Final    Potassium 12/28/2020 4.5  3.6 - 5.2 mmol/L Final    Chloride 12/28/2020 98* 100 - 108 mmol/L Final    CO2 12/28/2020 34* 21 - 32 mmol/L Final    Anion Gap 12/28/2020 4.0  3.0 - 11.0 mmol/L Final    BUN 12/28/2020 13  7 - 18 mg/dL Final    Creatinine 12/28/2020 0.95  0.55 - 1.02 mg/dL Final    GFR ESTIMATION 12/28/2020 > 60  >60 Final               DESCRIPTION       GLOMERULAR FILTRATION RATE             NORMAL                     >60             KIDNEY  DISEASE           15-60             KIDNEY FAILURE             <15    BUN/Creatinine Ratio 12/28/2020 13.68  7 - 18 Ratio Final    Glucose 12/28/2020 177* 70 - 110 mg/dL Final    Calcium 12/28/2020 9.6  8.8 - 10.5 mg/dL Final    Total Bilirubin 12/28/2020 0.3  0.0 - 1.0 mg/dL Final    AST 12/28/2020 17  15 - 37 U/L Final    ALT 12/28/2020 24  12 - 78 U/L Final    Total Protein 12/28/2020 7.2  6.4 - 8.2 g/dL Final    Albumin 12/28/2020 3.7  3.4 - 5.0 g/dL Final    Globulin 12/28/2020 3.5  2.3 - 3.5 g/dL Final    Albumin/Globulin Ratio 12/28/2020 1.057* 1.1 - 1.8 Ratio Final    Alkaline Phosphatase 12/28/2020 48  46 - 116 U/L Final    Neutrophils 12/28/2020 59.0  37 - 80 % Final     Neutrophils Absolute 12/28/2020 2.1  1.8 - 7.7 10*3/uL Final    Bands 12/28/2020 2.0  0 - 5 % Final    Lymphocytes 12/28/2020 20.0* 25 - 40 % Final    Monocytes 12/28/2020 19.0* 1 - 15 % Final    Cells Counted 12/28/2020 100   Final    Platelet Estimate 12/28/2020 Adequate   Final    RBC Morphology 12/28/2020 N/N   Final   Ancillary Orders on 12/25/2020   Component Date Value Ref Range Status    TSH 12/28/2020 0.77  0.36 - 3.74 uIU/mL Final   Orders Only on 12/03/2020   Component Date Value Ref Range Status    Sodium 12/03/2020 133* 135 - 145 mmol/L Final    Potassium 12/03/2020 4.2  3.6 - 5.2 mmol/L Final    Chloride 12/03/2020 96* 100 - 108 mmol/L Final    CO2 12/03/2020 32  21 - 32 mmol/L Final    Anion Gap 12/03/2020 5.0  3.0 - 11.0 mmol/L Final    BUN 12/03/2020 19* 7 - 18 mg/dL Final    Creatinine 12/03/2020 1.14* 0.55 - 1.02 mg/dL Final    GFR ESTIMATION 12/03/2020 55* >60 Final               DESCRIPTION       GLOMERULAR FILTRATION RATE             NORMAL                     >60             KIDNEY  DISEASE           15-60             KIDNEY FAILURE             <15    BUN/Creatinine Ratio 12/03/2020 16.66  7 - 18 Ratio Final    Glucose 12/03/2020 193* 70 - 110 mg/dL Final    Calcium 12/03/2020 9.6  8.8 - 10.5 mg/dL Final    Total Bilirubin 12/03/2020 0.4  0.0 - 1.0 mg/dL Final    AST 12/03/2020 11* 15 - 37 U/L Final    ALT 12/03/2020 19  12 - 78 U/L Final    Total Protein 12/03/2020 6.9  6.4 - 8.2 g/dL Final    Albumin 12/03/2020 3.6  3.4 - 5.0 g/dL Final    Globulin 12/03/2020 3.3  2.3 - 3.5 g/dL Final    Albumin/Globulin Ratio 12/03/2020 1.090* 1.1 - 1.8 Ratio Final    Alkaline Phosphatase 12/03/2020 51  46 - 116 U/L Final    TSH 12/03/2020 0.61  0.36 - 3.74 uIU/mL Final   Orders Only on 12/03/2020   Component Date Value Ref Range Status    WBC 12/03/2020 3.9* 4.6 - 10.2 10*3/uL Final    RBC 12/03/2020 4.15* 4.2 - 5.4 10*6/uL Final    Hemoglobin 12/03/2020 11.7* 12.0 - 16.0 g/dL  Final    Hematocrit 12/03/2020 37.0  37.0 - 47.0 % Final    MCV 12/03/2020 89.2  80 - 97 fL Final    MCH 12/03/2020 28.2  27.0 - 31.2 pg Final    MCHC 12/03/2020 31.6* 31.8 - 35.4 g/dL Final    RDW RBC Auto-Rto 12/03/2020 13.3  12.5 - 18.0 % Final    Platelets 12/03/2020 206  142 - 424 10*3/uL Final    nRBC# 12/03/2020 0.0  % Final    Neutrophils 12/03/2020 65.0  37 - 80 % Final    Neutrophils Absolute 12/03/2020 2.5  1.8 - 7.7 10*3/uL Final    Lymphocytes 12/03/2020 14.0* 25 - 40 % Final    Monocytes 12/03/2020 10.0  1 - 15 % Final    Eosinophils 12/03/2020 11.0* 1 - 3 % Final    Cells Counted 12/03/2020 100   Final    Platelet Estimate 12/03/2020 Normal   Final    RBC Morphology 12/03/2020 N/N   Final   Office Visit on 11/11/2020   Component Date Value Ref Range Status    TSH 11/11/2020 0.59  0.36 - 3.74 uIU/mL Final   Office Visit on 11/05/2020   Component Date Value Ref Range Status    WBC 11/11/2020 3.7* 4.6 - 10.2 10*3/uL Final    RBC 11/11/2020 4.08* 4.2 - 5.4 10*6/uL Final    Hemoglobin 11/11/2020 11.6* 12.0 - 16.0 g/dL Final    Hematocrit 11/11/2020 36.1* 37.0 - 47.0 % Final    MCV 11/11/2020 88.5  80 - 97 fL Final    MCH 11/11/2020 28.4  27.0 - 31.2 pg Final    MCHC 11/11/2020 32.1  31.8 - 35.4 g/dL Final    RDW RBC Auto-Rto 11/11/2020 13.9  12.5 - 18.0 % Final    Platelets 11/11/2020 205  142 - 424 10*3/uL Final    Neutrophils 11/11/2020 65.2  37 - 80 % Final    Lymphocytes 11/11/2020 17.0* 25 - 40 % Final    Monocytes 11/11/2020 12.7  1 - 15 % Final    Eosinophils 11/11/2020 4.3* 1 - 3 % Final    Basophils 11/11/2020 0.5  0 - 3 % Final    nRBC# 11/11/2020 0.0  % Final    Neutrophils Absolute 11/11/2020 2.41  1.8 - 7.7 10*3/uL Final    Sodium 11/11/2020 134* 135 - 145 mmol/L Final    Potassium 11/11/2020 4.3  3.6 - 5.2 mmol/L Final    Chloride 11/11/2020 98* 100 - 108 mmol/L Final    CO2 11/11/2020 32  21 - 32 mmol/L Final    Anion Gap 11/11/2020 4.0  3.0 - 11.0 mmol/L  Final    BUN 2020 14  7 - 18 mg/dL Final    Creatinine 2020 1.07* 0.55 - 1.02 mg/dL Final    GFR ESTIMATION 2020 59* >60 Final               DESCRIPTION       GLOMERULAR FILTRATION RATE             NORMAL                     >60             KIDNEY  DISEASE           15-60             KIDNEY FAILURE             <15    BUN/Creatinine Ratio 2020 13.08  7 - 18 Ratio Final    Glucose 2020 150* 70 - 110 mg/dL Final    Calcium 2020 9.5  8.8 - 10.5 mg/dL Final    Total Bilirubin 2020 0.3  0.0 - 1.0 mg/dL Final    AST 2020 14* 15 - 37 U/L Final    ALT 2020 25  12 - 78 U/L Final    Total Protein 2020 6.8  6.4 - 8.2 g/dL Final    Albumin 2020 3.6  3.4 - 5.0 g/dL Final    Globulin 2020 3.2  2.3 - 3.5 g/dL Final    Albumin/Globulin Ratio 2020 1.125  1.1 - 1.8 Ratio Final    Alkaline Phosphatase 2020 46  46 - 116 U/L Final   Orders Only on 2020   Component Date Value Ref Range Status    POC Glucose 2020 130* 70 - 105 Final    POC Test Performed   -  Diabetic Fastin-180 mg/dl   Orders Only on 2020   Component Date Value Ref Range Status    POC Glucose 2020 139* 70 - 105 Final    POC Test Performed   -  Diabetic Fastin-180 mg/dl   Orders Only on 07/15/2020   Component Date Value Ref Range Status    COVID-19 Ab, IgM 07/15/2020 Negative  Negative Final    IgM usually appears within 3-5 days of symptoms.  Negativeresults do not rule out SARS-CoV-2 infection, particularlyin those who have been in contact with the virus.    Antibody SARS CoV-2 07/15/2020 Negative  Negative Final    Comment: IgG usually appears approximately 10 days after symptomsThis test has been approved by the FDA under and EmergencyUse Authorization (EUA).  Results from antibody testingshould not be used as the sole basis to diagnose or [id number] infection or to   inform infection status. Testresults should be correlated  with clinical presentation ofpatient.     Orders Only on 07/15/2020   Component Date Value Ref Range Status    Sodium 07/15/2020 136  135 - 145 mmol/L Final    Potassium 07/15/2020 4.2  3.6 - 5.2 mmol/L Final    Chloride 07/15/2020 97* 100 - 108 mmol/L Final    CO2 07/15/2020 33* 21 - 32 mmol/L Final    Anion Gap 07/15/2020 6.0  3.0 - 11.0 mmol/L Final    BUN 07/15/2020 16  7 - 18 mg/dL Final    Creatinine 07/15/2020 1.07* 0.55 - 1.02 mg/dL Final    GFR ESTIMATION 07/15/2020 59* >60 Final               DESCRIPTION       GLOMERULAR FILTRATION RATE             NORMAL                     >60             KIDNEY  DISEASE           15-60             KIDNEY FAILURE             <15    BUN/Creatinine Ratio 07/15/2020 14.95  7 - 18 Ratio Final    Glucose 07/15/2020 204* 70 - 110 mg/dL Final    Calcium 07/15/2020 9.6  8.8 - 10.5 mg/dL Final   There may be more visits with results that are not included.     All cell laboratory reviewed which shows white cell 1.7 and sodium 126  Imaging:     Assessment:     Principle Problem: Malignant 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 [C67.3]   Co-morbidity/Active Problems:   Patient Active Problem List   Diagnosis    Malignant neoplasm of urinary bladder    Essential hypertension    Type 2 diabetes mellitus without complication, without long-term current use of insulin    Encounter for antineoplastic chemotherapy    Chemotherapy-induced nausea    Chemotherapy-induced neutropenia    Absolute anemia        Sherice Haji is a 88 y.o. 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 Diagnoses of Essential hypertension and Type 2 diabetes mellitus without complication, without long-term current use of  insulin were also pertinent to this visit., with Malignant 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 [C67.3]   87-year-old female otherwise healthy except hypertension diabetes present with bladder mass biopsy-proven muscle invasive bladder cancer( stage II) and     biopsy-proven muscle invasive bladder cancer( stage II) recurrence locally  Normal renal function  Neutropenia  Status post chemo radiation therapy December 2019  recurrent muscle invasive bladder cancer    Plan:  Status post   Overall Plan:  Concurrent chemoradiation therapy, followed by optional cystectomy and full dose chemo C/G; patient does not want chemotherapy so the other option is immunotherapy    ==Labs CBC CMP q. 3 weeks  ==Cont mmunotherapy Keytruda  ==rtc 3 weeks   ==Repeat Urology and consider repeat cystoscope every 3 months to evaluate response /Adalid Valera MD [facility] [phone]

## 2021-01-14 ENCOUNTER — OFFICE VISIT (OUTPATIENT)
Dept: HEMATOLOGY/ONCOLOGY | Facility: CLINIC | Age: 86
End: 2021-01-14
Payer: COMMERCIAL

## 2021-01-14 VITALS
SYSTOLIC BLOOD PRESSURE: 209 MMHG | OXYGEN SATURATION: 95 % | HEART RATE: 89 BPM | DIASTOLIC BLOOD PRESSURE: 99 MMHG | WEIGHT: 156 LBS | HEIGHT: 63 IN | BODY MASS INDEX: 27.64 KG/M2 | TEMPERATURE: 97 F | RESPIRATION RATE: 18 BRPM

## 2021-01-14 DIAGNOSIS — C67.3 MALIGNANT NEOPLASM OF ANTERIOR WALL OF URINARY BLADDER: Primary | ICD-10-CM

## 2021-01-14 LAB
ALBUMIN SERPL BCP-MCNC: 3.7 G/DL (ref 3.4–5)
ALBUMIN/GLOBULIN RATIO: 1.12 RATIO (ref 1.1–1.8)
ALP SERPL-CCNC: 48 U/L (ref 46–116)
ALT SERPL W P-5'-P-CCNC: 23 U/L (ref 12–78)
ANION GAP SERPL CALC-SCNC: 5 MMOL/L (ref 3–11)
AST SERPL-CCNC: 15 U/L (ref 15–37)
BILIRUB SERPL-MCNC: 0.2 MG/DL (ref 0–1)
BUN SERPL-MCNC: 19 MG/DL (ref 7–18)
BUN/CREAT SERPL: 19.19 RATIO (ref 7–18)
CALCIUM SERPL-MCNC: 9.6 MG/DL (ref 8.8–10.5)
CELLS COUNTED: 100
CHLORIDE SERPL-SCNC: 102 MMOL/L (ref 100–108)
CO2 SERPL-SCNC: 34 MMOL/L (ref 21–32)
CREAT SERPL-MCNC: 0.99 MG/DL (ref 0.55–1.02)
EOSINOPHIL NFR BLD: 3 % (ref 1–3)
ERYTHROCYTE [DISTWIDTH] IN BLOOD BY AUTOMATED COUNT: 13.7 % (ref 12.5–18)
GFR ESTIMATION: > 60
GLOBULIN: 3.3 G/DL (ref 2.3–3.5)
GLUCOSE SERPL-MCNC: 153 MG/DL (ref 70–110)
HCT VFR BLD AUTO: 38.5 % (ref 37–47)
HGB BLD-MCNC: 12 G/DL (ref 12–16)
LYMPHOCYTES NFR BLD: 15 % (ref 25–40)
MCH RBC QN AUTO: 28 PG (ref 27–31.2)
MCHC RBC AUTO-ENTMCNC: 31.2 G/DL (ref 31.8–35.4)
MCV RBC AUTO: 90 FL (ref 80–97)
MONOCYTES NFR BLD: 19 % (ref 1–15)
NEUTROPHILS # BLD AUTO: 2.4 10*3/UL (ref 1.8–7.7)
NEUTROPHILS NFR BLD: 63 % (ref 37–80)
NUCLEATED RED BLOOD CELLS: 0 %
PLATELETS: 193 10*3/UL (ref 142–424)
POTASSIUM SERPL-SCNC: 4.4 MMOL/L (ref 3.6–5.2)
PROT SERPL-MCNC: 7 G/DL (ref 6.4–8.2)
RBC # BLD AUTO: 4.28 10*6/UL (ref 4.2–5.4)
RBC MORPH BLD: ABNORMAL
SMALL PLATELETS BLD QL SMEAR: ADEQUATE
SODIUM BLD-SCNC: 141 MMOL/L (ref 135–145)
WBC # BLD: 3.8 10*3/UL (ref 4.6–10.2)

## 2021-01-14 PROCEDURE — 3288F FALL RISK ASSESSMENT DOCD: CPT | Mod: CPTII,S$GLB,, | Performed by: INTERNAL MEDICINE

## 2021-01-14 PROCEDURE — 1159F MED LIST DOCD IN RCRD: CPT | Mod: S$GLB,,, | Performed by: INTERNAL MEDICINE

## 2021-01-14 PROCEDURE — 99214 PR OFFICE/OUTPT VISIT, EST, LEVL IV, 30-39 MIN: ICD-10-PCS | Mod: S$GLB,,, | Performed by: INTERNAL MEDICINE

## 2021-01-14 PROCEDURE — 1126F PR PAIN SEVERITY QUANTIFIED, NO PAIN PRESENT: ICD-10-PCS | Mod: S$GLB,,, | Performed by: INTERNAL MEDICINE

## 2021-01-14 PROCEDURE — 1126F AMNT PAIN NOTED NONE PRSNT: CPT | Mod: S$GLB,,, | Performed by: INTERNAL MEDICINE

## 2021-01-14 PROCEDURE — 99214 OFFICE O/P EST MOD 30 MIN: CPT | Mod: S$GLB,,, | Performed by: INTERNAL MEDICINE

## 2021-01-14 PROCEDURE — 1159F PR MEDICATION LIST DOCUMENTED IN MEDICAL RECORD: ICD-10-PCS | Mod: S$GLB,,, | Performed by: INTERNAL MEDICINE

## 2021-01-14 PROCEDURE — 3288F PR FALLS RISK ASSESSMENT DOCUMENTED: ICD-10-PCS | Mod: CPTII,S$GLB,, | Performed by: INTERNAL MEDICINE

## 2021-01-14 PROCEDURE — 1101F PT FALLS ASSESS-DOCD LE1/YR: CPT | Mod: CPTII,S$GLB,, | Performed by: INTERNAL MEDICINE

## 2021-01-14 PROCEDURE — 1101F PR PT FALLS ASSESS DOC 0-1 FALLS W/OUT INJ PAST YR: ICD-10-PCS | Mod: CPTII,S$GLB,, | Performed by: INTERNAL MEDICINE

## 2021-01-14 RX ORDER — HEPARIN 100 UNIT/ML
500 SYRINGE INTRAVENOUS
Status: CANCELLED | OUTPATIENT
Start: 2021-01-18

## 2021-01-14 RX ORDER — SODIUM CHLORIDE 0.9 % (FLUSH) 0.9 %
10 SYRINGE (ML) INJECTION
Status: CANCELLED | OUTPATIENT
Start: 2021-01-18

## 2021-01-22 ENCOUNTER — OFFICE VISIT (OUTPATIENT)
Dept: UROLOGY | Facility: CLINIC | Age: 86
End: 2021-01-22
Payer: COMMERCIAL

## 2021-01-22 VITALS — HEIGHT: 63 IN | BODY MASS INDEX: 27.46 KG/M2 | WEIGHT: 155 LBS

## 2021-01-22 DIAGNOSIS — C67.9 MALIGNANT NEOPLASM OF URINARY BLADDER, UNSPECIFIED SITE: Primary | ICD-10-CM

## 2021-01-22 PROCEDURE — 99213 PR OFFICE/OUTPT VISIT, EST, LEVL III, 20-29 MIN: ICD-10-PCS | Mod: S$GLB,,, | Performed by: SPECIALIST

## 2021-01-22 PROCEDURE — 1159F MED LIST DOCD IN RCRD: CPT | Mod: S$GLB,,, | Performed by: SPECIALIST

## 2021-01-22 PROCEDURE — 1125F AMNT PAIN NOTED PAIN PRSNT: CPT | Mod: S$GLB,,, | Performed by: SPECIALIST

## 2021-01-22 PROCEDURE — 1159F PR MEDICATION LIST DOCUMENTED IN MEDICAL RECORD: ICD-10-PCS | Mod: S$GLB,,, | Performed by: SPECIALIST

## 2021-01-22 PROCEDURE — 1125F PR PAIN SEVERITY QUANTIFIED, PAIN PRESENT: ICD-10-PCS | Mod: S$GLB,,, | Performed by: SPECIALIST

## 2021-01-22 PROCEDURE — 99213 OFFICE O/P EST LOW 20 MIN: CPT | Mod: S$GLB,,, | Performed by: SPECIALIST

## 2021-01-26 ENCOUNTER — TELEPHONE (OUTPATIENT)
Dept: UROLOGY | Facility: CLINIC | Age: 86
End: 2021-01-26

## 2021-02-01 ENCOUNTER — TELEPHONE (OUTPATIENT)
Dept: HEMATOLOGY/ONCOLOGY | Facility: CLINIC | Age: 86
End: 2021-02-01

## 2021-02-10 ENCOUNTER — OFFICE VISIT (OUTPATIENT)
Dept: HEMATOLOGY/ONCOLOGY | Facility: CLINIC | Age: 86
End: 2021-02-10
Payer: COMMERCIAL

## 2021-02-10 VITALS
RESPIRATION RATE: 16 BRPM | TEMPERATURE: 99 F | WEIGHT: 156 LBS | DIASTOLIC BLOOD PRESSURE: 88 MMHG | HEART RATE: 82 BPM | BODY MASS INDEX: 27.64 KG/M2 | OXYGEN SATURATION: 98 % | SYSTOLIC BLOOD PRESSURE: 178 MMHG | HEIGHT: 63 IN

## 2021-02-10 DIAGNOSIS — C67.9 MALIGNANT NEOPLASM OF URINARY BLADDER, UNSPECIFIED SITE: ICD-10-CM

## 2021-02-10 DIAGNOSIS — I10 HYPERTENSION, UNSPECIFIED TYPE: Primary | ICD-10-CM

## 2021-02-10 LAB
ALBUMIN SERPL BCP-MCNC: 3.4 G/DL (ref 3.4–5)
ALBUMIN/GLOBULIN RATIO: 1.06 RATIO (ref 1.1–1.8)
ALP SERPL-CCNC: 47 U/L (ref 46–116)
ALT SERPL W P-5'-P-CCNC: 25 U/L (ref 12–78)
ANION GAP SERPL CALC-SCNC: 6 MMOL/L (ref 3–11)
AST SERPL-CCNC: 17 U/L (ref 15–37)
BANDS: 2 % (ref 0–5)
BILIRUB SERPL-MCNC: 0.3 MG/DL (ref 0–1)
BUN SERPL-MCNC: 14 MG/DL (ref 7–18)
BUN/CREAT SERPL: 13.59 RATIO (ref 7–18)
CALCIUM SERPL-MCNC: 9.3 MG/DL (ref 8.8–10.5)
CELLS COUNTED: 100
CHLORIDE SERPL-SCNC: 102 MMOL/L (ref 100–108)
CO2 SERPL-SCNC: 32 MMOL/L (ref 21–32)
CREAT SERPL-MCNC: 1.03 MG/DL (ref 0.55–1.02)
EOSINOPHIL NFR BLD: 4 % (ref 1–3)
ERYTHROCYTE [DISTWIDTH] IN BLOOD BY AUTOMATED COUNT: 13.6 % (ref 12.5–18)
GFR ESTIMATION: > 60
GLOBULIN: 3.2 G/DL (ref 2.3–3.5)
GLUCOSE SERPL-MCNC: 135 MG/DL (ref 70–110)
HCT VFR BLD AUTO: 36.7 % (ref 37–47)
HGB BLD-MCNC: 11.7 G/DL (ref 12–16)
LYMPHOCYTES NFR BLD: 19 % (ref 25–40)
MCH RBC QN AUTO: 28.3 PG (ref 27–31.2)
MCHC RBC AUTO-ENTMCNC: 31.9 G/DL (ref 31.8–35.4)
MCV RBC AUTO: 88.9 FL (ref 80–97)
MONOCYTES NFR BLD: 14 % (ref 1–15)
NEUTROPHILS # BLD AUTO: 2.1 10*3/UL (ref 1.8–7.7)
NEUTROPHILS NFR BLD: 61 % (ref 37–80)
NUCLEATED RED BLOOD CELLS: 0 %
PLATELETS: 187 10*3/UL (ref 142–424)
POTASSIUM SERPL-SCNC: 4.6 MMOL/L (ref 3.6–5.2)
PROT SERPL-MCNC: 6.6 G/DL (ref 6.4–8.2)
RBC # BLD AUTO: 4.13 10*6/UL (ref 4.2–5.4)
RBC MORPH BLD: ABNORMAL
SMALL PLATELETS BLD QL SMEAR: ADEQUATE
SODIUM BLD-SCNC: 140 MMOL/L (ref 135–145)
WBC # BLD: 3.4 10*3/UL (ref 4.6–10.2)

## 2021-02-10 PROCEDURE — 1159F MED LIST DOCD IN RCRD: CPT | Mod: S$GLB,,, | Performed by: NURSE PRACTITIONER

## 2021-02-10 PROCEDURE — 1126F PR PAIN SEVERITY QUANTIFIED, NO PAIN PRESENT: ICD-10-PCS | Mod: S$GLB,,, | Performed by: NURSE PRACTITIONER

## 2021-02-10 PROCEDURE — 1159F PR MEDICATION LIST DOCUMENTED IN MEDICAL RECORD: ICD-10-PCS | Mod: S$GLB,,, | Performed by: NURSE PRACTITIONER

## 2021-02-10 PROCEDURE — 1126F AMNT PAIN NOTED NONE PRSNT: CPT | Mod: S$GLB,,, | Performed by: NURSE PRACTITIONER

## 2021-02-10 PROCEDURE — 3288F PR FALLS RISK ASSESSMENT DOCUMENTED: ICD-10-PCS | Mod: CPTII,S$GLB,, | Performed by: NURSE PRACTITIONER

## 2021-02-10 PROCEDURE — 1101F PT FALLS ASSESS-DOCD LE1/YR: CPT | Mod: CPTII,S$GLB,, | Performed by: NURSE PRACTITIONER

## 2021-02-10 PROCEDURE — 1101F PR PT FALLS ASSESS DOC 0-1 FALLS W/OUT INJ PAST YR: ICD-10-PCS | Mod: CPTII,S$GLB,, | Performed by: NURSE PRACTITIONER

## 2021-02-10 PROCEDURE — 99214 PR OFFICE/OUTPT VISIT, EST, LEVL IV, 30-39 MIN: ICD-10-PCS | Mod: S$GLB,,, | Performed by: NURSE PRACTITIONER

## 2021-02-10 PROCEDURE — 3288F FALL RISK ASSESSMENT DOCD: CPT | Mod: CPTII,S$GLB,, | Performed by: NURSE PRACTITIONER

## 2021-02-10 PROCEDURE — 99214 OFFICE O/P EST MOD 30 MIN: CPT | Mod: S$GLB,,, | Performed by: NURSE PRACTITIONER

## 2021-02-10 RX ORDER — SODIUM CHLORIDE 0.9 % (FLUSH) 0.9 %
10 SYRINGE (ML) INJECTION
Status: CANCELLED | OUTPATIENT
Start: 2021-03-10

## 2021-02-10 RX ORDER — HEPARIN 100 UNIT/ML
500 SYRINGE INTRAVENOUS
Status: CANCELLED | OUTPATIENT
Start: 2021-03-10

## 2021-02-10 RX ORDER — HYDROXYZINE HYDROCHLORIDE 25 MG/1
25 TABLET, FILM COATED ORAL EVERY 8 HOURS PRN
COMMUNITY
Start: 2021-01-08 | End: 2022-02-01

## 2021-02-18 ENCOUNTER — TELEPHONE (OUTPATIENT)
Dept: HEMATOLOGY/ONCOLOGY | Facility: CLINIC | Age: 86
End: 2021-02-18

## 2021-02-22 ENCOUNTER — PROCEDURE VISIT (OUTPATIENT)
Dept: UROLOGY | Facility: CLINIC | Age: 86
End: 2021-02-22
Payer: COMMERCIAL

## 2021-02-22 VITALS
SYSTOLIC BLOOD PRESSURE: 231 MMHG | DIASTOLIC BLOOD PRESSURE: 106 MMHG | BODY MASS INDEX: 27.46 KG/M2 | HEIGHT: 63 IN | RESPIRATION RATE: 18 BRPM | WEIGHT: 155 LBS

## 2021-02-22 DIAGNOSIS — C67.9 MALIGNANT NEOPLASM OF URINARY BLADDER, UNSPECIFIED SITE: ICD-10-CM

## 2021-02-22 PROCEDURE — 52000 CYSTOURETHROSCOPY: CPT | Mod: S$GLB,,, | Performed by: SPECIALIST

## 2021-02-22 PROCEDURE — 52000 PR CYSTOURETHROSCOPY: ICD-10-PCS | Mod: S$GLB,,, | Performed by: SPECIALIST

## 2021-02-22 RX ORDER — CIPROFLOXACIN 500 MG/1
500 TABLET ORAL
Status: COMPLETED | OUTPATIENT
Start: 2021-02-22 | End: 2021-02-22

## 2021-02-22 RX ADMIN — CIPROFLOXACIN 500 MG: 500 TABLET ORAL at 02:02

## 2021-03-04 ENCOUNTER — TELEPHONE (OUTPATIENT)
Dept: HEMATOLOGY/ONCOLOGY | Facility: CLINIC | Age: 86
End: 2021-03-04

## 2021-03-10 ENCOUNTER — OFFICE VISIT (OUTPATIENT)
Dept: HEMATOLOGY/ONCOLOGY | Facility: CLINIC | Age: 86
End: 2021-03-10
Payer: COMMERCIAL

## 2021-03-10 VITALS
HEIGHT: 63 IN | DIASTOLIC BLOOD PRESSURE: 90 MMHG | HEART RATE: 104 BPM | RESPIRATION RATE: 18 BRPM | SYSTOLIC BLOOD PRESSURE: 172 MMHG | TEMPERATURE: 97 F | WEIGHT: 154.69 LBS | OXYGEN SATURATION: 96 % | BODY MASS INDEX: 27.41 KG/M2

## 2021-03-10 DIAGNOSIS — C67.9 MALIGNANT NEOPLASM OF URINARY BLADDER, UNSPECIFIED SITE: Primary | ICD-10-CM

## 2021-03-10 PROCEDURE — 1159F MED LIST DOCD IN RCRD: CPT | Mod: S$GLB,,, | Performed by: NURSE PRACTITIONER

## 2021-03-10 PROCEDURE — 99214 OFFICE O/P EST MOD 30 MIN: CPT | Mod: S$GLB,,, | Performed by: NURSE PRACTITIONER

## 2021-03-10 PROCEDURE — 1126F AMNT PAIN NOTED NONE PRSNT: CPT | Mod: S$GLB,,, | Performed by: NURSE PRACTITIONER

## 2021-03-10 PROCEDURE — 1101F PT FALLS ASSESS-DOCD LE1/YR: CPT | Mod: CPTII,S$GLB,, | Performed by: NURSE PRACTITIONER

## 2021-03-10 PROCEDURE — 3288F FALL RISK ASSESSMENT DOCD: CPT | Mod: CPTII,S$GLB,, | Performed by: NURSE PRACTITIONER

## 2021-03-10 PROCEDURE — 1101F PR PT FALLS ASSESS DOC 0-1 FALLS W/OUT INJ PAST YR: ICD-10-PCS | Mod: CPTII,S$GLB,, | Performed by: NURSE PRACTITIONER

## 2021-03-10 PROCEDURE — 99214 PR OFFICE/OUTPT VISIT, EST, LEVL IV, 30-39 MIN: ICD-10-PCS | Mod: S$GLB,,, | Performed by: NURSE PRACTITIONER

## 2021-03-10 PROCEDURE — 3288F PR FALLS RISK ASSESSMENT DOCUMENTED: ICD-10-PCS | Mod: CPTII,S$GLB,, | Performed by: NURSE PRACTITIONER

## 2021-03-10 PROCEDURE — 1159F PR MEDICATION LIST DOCUMENTED IN MEDICAL RECORD: ICD-10-PCS | Mod: S$GLB,,, | Performed by: NURSE PRACTITIONER

## 2021-03-10 PROCEDURE — 1126F PR PAIN SEVERITY QUANTIFIED, NO PAIN PRESENT: ICD-10-PCS | Mod: S$GLB,,, | Performed by: NURSE PRACTITIONER

## 2021-03-16 ENCOUNTER — TELEPHONE (OUTPATIENT)
Dept: HEMATOLOGY/ONCOLOGY | Facility: CLINIC | Age: 86
End: 2021-03-16

## 2021-03-30 ENCOUNTER — OFFICE VISIT (OUTPATIENT)
Dept: HEMATOLOGY/ONCOLOGY | Facility: CLINIC | Age: 86
End: 2021-03-30
Payer: COMMERCIAL

## 2021-03-30 VITALS
BODY MASS INDEX: 27.46 KG/M2 | WEIGHT: 155 LBS | TEMPERATURE: 97 F | OXYGEN SATURATION: 95 % | SYSTOLIC BLOOD PRESSURE: 183 MMHG | HEART RATE: 86 BPM | RESPIRATION RATE: 16 BRPM | HEIGHT: 63 IN | DIASTOLIC BLOOD PRESSURE: 92 MMHG

## 2021-03-30 DIAGNOSIS — C78.02 MALIGNANT NEOPLASM METASTATIC TO BOTH LUNGS: ICD-10-CM

## 2021-03-30 DIAGNOSIS — C78.01 MALIGNANT NEOPLASM METASTATIC TO BOTH LUNGS: ICD-10-CM

## 2021-03-30 DIAGNOSIS — C67.9 MALIGNANT NEOPLASM OF URINARY BLADDER, UNSPECIFIED SITE: Primary | ICD-10-CM

## 2021-03-30 DIAGNOSIS — Z29.89 ENCOUNTER FOR IMMUNOTHERAPY: ICD-10-CM

## 2021-03-30 PROCEDURE — 1101F PT FALLS ASSESS-DOCD LE1/YR: CPT | Mod: CPTII,S$GLB,, | Performed by: NURSE PRACTITIONER

## 2021-03-30 PROCEDURE — 99214 OFFICE O/P EST MOD 30 MIN: CPT | Mod: S$GLB,,, | Performed by: NURSE PRACTITIONER

## 2021-03-30 PROCEDURE — 3288F PR FALLS RISK ASSESSMENT DOCUMENTED: ICD-10-PCS | Mod: CPTII,S$GLB,, | Performed by: NURSE PRACTITIONER

## 2021-03-30 PROCEDURE — 1101F PR PT FALLS ASSESS DOC 0-1 FALLS W/OUT INJ PAST YR: ICD-10-PCS | Mod: CPTII,S$GLB,, | Performed by: NURSE PRACTITIONER

## 2021-03-30 PROCEDURE — 99214 PR OFFICE/OUTPT VISIT, EST, LEVL IV, 30-39 MIN: ICD-10-PCS | Mod: S$GLB,,, | Performed by: NURSE PRACTITIONER

## 2021-03-30 PROCEDURE — 1159F PR MEDICATION LIST DOCUMENTED IN MEDICAL RECORD: ICD-10-PCS | Mod: S$GLB,,, | Performed by: NURSE PRACTITIONER

## 2021-03-30 PROCEDURE — 3288F FALL RISK ASSESSMENT DOCD: CPT | Mod: CPTII,S$GLB,, | Performed by: NURSE PRACTITIONER

## 2021-03-30 PROCEDURE — 1159F MED LIST DOCD IN RCRD: CPT | Mod: S$GLB,,, | Performed by: NURSE PRACTITIONER

## 2021-03-30 RX ORDER — HEPARIN 100 UNIT/ML
500 SYRINGE INTRAVENOUS
Status: CANCELLED | OUTPATIENT
Start: 2021-03-31

## 2021-03-30 RX ORDER — SODIUM CHLORIDE 0.9 % (FLUSH) 0.9 %
10 SYRINGE (ML) INJECTION
Status: CANCELLED | OUTPATIENT
Start: 2021-03-31

## 2021-04-20 ENCOUNTER — OFFICE VISIT (OUTPATIENT)
Dept: HEMATOLOGY/ONCOLOGY | Facility: CLINIC | Age: 86
End: 2021-04-20
Payer: COMMERCIAL

## 2021-04-20 VITALS
DIASTOLIC BLOOD PRESSURE: 88 MMHG | OXYGEN SATURATION: 96 % | HEART RATE: 79 BPM | WEIGHT: 154.13 LBS | TEMPERATURE: 97 F | BODY MASS INDEX: 27.31 KG/M2 | HEIGHT: 63 IN | SYSTOLIC BLOOD PRESSURE: 164 MMHG | RESPIRATION RATE: 18 BRPM

## 2021-04-20 DIAGNOSIS — C78.01 MALIGNANT NEOPLASM METASTATIC TO BOTH LUNGS: ICD-10-CM

## 2021-04-20 DIAGNOSIS — Z29.89 ENCOUNTER FOR IMMUNOTHERAPY: Primary | ICD-10-CM

## 2021-04-20 DIAGNOSIS — K59.00 CONSTIPATION, UNSPECIFIED CONSTIPATION TYPE: ICD-10-CM

## 2021-04-20 DIAGNOSIS — C78.02 MALIGNANT NEOPLASM METASTATIC TO BOTH LUNGS: ICD-10-CM

## 2021-04-20 LAB — TSH SERPL DL<=0.005 MIU/L-ACNC: 2.62 UIU/ML (ref 0.36–3.74)

## 2021-04-20 PROCEDURE — 1126F PR PAIN SEVERITY QUANTIFIED, NO PAIN PRESENT: ICD-10-PCS | Mod: S$GLB,,, | Performed by: NURSE PRACTITIONER

## 2021-04-20 PROCEDURE — 1159F PR MEDICATION LIST DOCUMENTED IN MEDICAL RECORD: ICD-10-PCS | Mod: S$GLB,,, | Performed by: NURSE PRACTITIONER

## 2021-04-20 PROCEDURE — 1159F MED LIST DOCD IN RCRD: CPT | Mod: S$GLB,,, | Performed by: NURSE PRACTITIONER

## 2021-04-20 PROCEDURE — 1126F AMNT PAIN NOTED NONE PRSNT: CPT | Mod: S$GLB,,, | Performed by: NURSE PRACTITIONER

## 2021-04-20 PROCEDURE — 3288F FALL RISK ASSESSMENT DOCD: CPT | Mod: CPTII,S$GLB,, | Performed by: NURSE PRACTITIONER

## 2021-04-20 PROCEDURE — 99214 OFFICE O/P EST MOD 30 MIN: CPT | Mod: S$GLB,,, | Performed by: NURSE PRACTITIONER

## 2021-04-20 PROCEDURE — 1101F PR PT FALLS ASSESS DOC 0-1 FALLS W/OUT INJ PAST YR: ICD-10-PCS | Mod: CPTII,S$GLB,, | Performed by: NURSE PRACTITIONER

## 2021-04-20 PROCEDURE — 99214 PR OFFICE/OUTPT VISIT, EST, LEVL IV, 30-39 MIN: ICD-10-PCS | Mod: S$GLB,,, | Performed by: NURSE PRACTITIONER

## 2021-04-20 PROCEDURE — 3288F PR FALLS RISK ASSESSMENT DOCUMENTED: ICD-10-PCS | Mod: CPTII,S$GLB,, | Performed by: NURSE PRACTITIONER

## 2021-04-20 PROCEDURE — 1101F PT FALLS ASSESS-DOCD LE1/YR: CPT | Mod: CPTII,S$GLB,, | Performed by: NURSE PRACTITIONER

## 2021-04-20 RX ORDER — SODIUM CHLORIDE 0.9 % (FLUSH) 0.9 %
10 SYRINGE (ML) INJECTION
Status: CANCELLED | OUTPATIENT
Start: 2021-04-21

## 2021-04-20 RX ORDER — HEPARIN 100 UNIT/ML
500 SYRINGE INTRAVENOUS
Status: CANCELLED | OUTPATIENT
Start: 2021-04-21

## 2021-04-20 RX ORDER — AMLODIPINE BESYLATE 2.5 MG/1
TABLET ORAL
COMMUNITY
Start: 2021-04-01 | End: 2022-02-01

## 2021-05-11 ENCOUNTER — OFFICE VISIT (OUTPATIENT)
Dept: HEMATOLOGY/ONCOLOGY | Facility: CLINIC | Age: 86
End: 2021-05-11
Payer: COMMERCIAL

## 2021-05-11 VITALS
WEIGHT: 153.31 LBS | HEIGHT: 63 IN | BODY MASS INDEX: 27.16 KG/M2 | RESPIRATION RATE: 18 BRPM | DIASTOLIC BLOOD PRESSURE: 85 MMHG | OXYGEN SATURATION: 95 % | HEART RATE: 90 BPM | SYSTOLIC BLOOD PRESSURE: 172 MMHG | TEMPERATURE: 97 F

## 2021-05-11 DIAGNOSIS — C78.02 MALIGNANT NEOPLASM METASTATIC TO BOTH LUNGS: Primary | ICD-10-CM

## 2021-05-11 DIAGNOSIS — Z29.89 ENCOUNTER FOR IMMUNOTHERAPY: ICD-10-CM

## 2021-05-11 DIAGNOSIS — R73.9 ELEVATED BLOOD SUGAR: ICD-10-CM

## 2021-05-11 DIAGNOSIS — E11.9 TYPE 2 DIABETES MELLITUS WITHOUT COMPLICATION, WITHOUT LONG-TERM CURRENT USE OF INSULIN: ICD-10-CM

## 2021-05-11 DIAGNOSIS — C78.01 MALIGNANT NEOPLASM METASTATIC TO BOTH LUNGS: Primary | ICD-10-CM

## 2021-05-11 DIAGNOSIS — C67.9 MALIGNANT NEOPLASM OF URINARY BLADDER, UNSPECIFIED SITE: ICD-10-CM

## 2021-05-11 PROCEDURE — 99214 PR OFFICE/OUTPT VISIT, EST, LEVL IV, 30-39 MIN: ICD-10-PCS | Mod: S$GLB,,, | Performed by: NURSE PRACTITIONER

## 2021-05-11 PROCEDURE — 1159F MED LIST DOCD IN RCRD: CPT | Mod: S$GLB,,, | Performed by: NURSE PRACTITIONER

## 2021-05-11 PROCEDURE — 1126F PR PAIN SEVERITY QUANTIFIED, NO PAIN PRESENT: ICD-10-PCS | Mod: S$GLB,,, | Performed by: NURSE PRACTITIONER

## 2021-05-11 PROCEDURE — 1101F PR PT FALLS ASSESS DOC 0-1 FALLS W/OUT INJ PAST YR: ICD-10-PCS | Mod: CPTII,S$GLB,, | Performed by: NURSE PRACTITIONER

## 2021-05-11 PROCEDURE — 3288F PR FALLS RISK ASSESSMENT DOCUMENTED: ICD-10-PCS | Mod: CPTII,S$GLB,, | Performed by: NURSE PRACTITIONER

## 2021-05-11 PROCEDURE — 1101F PT FALLS ASSESS-DOCD LE1/YR: CPT | Mod: CPTII,S$GLB,, | Performed by: NURSE PRACTITIONER

## 2021-05-11 PROCEDURE — 3288F FALL RISK ASSESSMENT DOCD: CPT | Mod: CPTII,S$GLB,, | Performed by: NURSE PRACTITIONER

## 2021-05-11 PROCEDURE — 1159F PR MEDICATION LIST DOCUMENTED IN MEDICAL RECORD: ICD-10-PCS | Mod: S$GLB,,, | Performed by: NURSE PRACTITIONER

## 2021-05-11 PROCEDURE — 99214 OFFICE O/P EST MOD 30 MIN: CPT | Mod: S$GLB,,, | Performed by: NURSE PRACTITIONER

## 2021-05-11 PROCEDURE — 1126F AMNT PAIN NOTED NONE PRSNT: CPT | Mod: S$GLB,,, | Performed by: NURSE PRACTITIONER

## 2021-05-11 RX ORDER — SODIUM CHLORIDE 0.9 % (FLUSH) 0.9 %
10 SYRINGE (ML) INJECTION
Status: CANCELLED | OUTPATIENT
Start: 2021-05-12

## 2021-05-11 RX ORDER — HEPARIN 100 UNIT/ML
500 SYRINGE INTRAVENOUS
Status: CANCELLED | OUTPATIENT
Start: 2021-05-12

## 2021-05-19 ENCOUNTER — TELEPHONE (OUTPATIENT)
Dept: UROLOGY | Facility: CLINIC | Age: 86
End: 2021-05-19

## 2021-05-20 ENCOUNTER — PROCEDURE VISIT (OUTPATIENT)
Dept: UROLOGY | Facility: CLINIC | Age: 86
End: 2021-05-20
Payer: COMMERCIAL

## 2021-05-20 VITALS
DIASTOLIC BLOOD PRESSURE: 95 MMHG | BODY MASS INDEX: 27.5 KG/M2 | OXYGEN SATURATION: 98 % | WEIGHT: 155.19 LBS | SYSTOLIC BLOOD PRESSURE: 197 MMHG | HEART RATE: 104 BPM | HEIGHT: 63 IN

## 2021-05-20 DIAGNOSIS — C67.9 MALIGNANT NEOPLASM OF URINARY BLADDER, UNSPECIFIED SITE: Primary | ICD-10-CM

## 2021-05-20 PROCEDURE — 52000 CYSTOURETHROSCOPY: CPT | Mod: S$GLB,,, | Performed by: SPECIALIST

## 2021-05-20 PROCEDURE — 52000 CYSTOSCOPY: ICD-10-PCS | Mod: S$GLB,,, | Performed by: SPECIALIST

## 2021-06-01 ENCOUNTER — TELEPHONE (OUTPATIENT)
Dept: HEMATOLOGY/ONCOLOGY | Facility: CLINIC | Age: 86
End: 2021-06-01

## 2021-06-01 ENCOUNTER — OFFICE VISIT (OUTPATIENT)
Dept: HEMATOLOGY/ONCOLOGY | Facility: CLINIC | Age: 86
End: 2021-06-01
Payer: COMMERCIAL

## 2021-06-01 VITALS
RESPIRATION RATE: 18 BRPM | OXYGEN SATURATION: 94 % | TEMPERATURE: 97 F | BODY MASS INDEX: 27.41 KG/M2 | HEIGHT: 63 IN | SYSTOLIC BLOOD PRESSURE: 168 MMHG | HEART RATE: 82 BPM | DIASTOLIC BLOOD PRESSURE: 93 MMHG | WEIGHT: 154.69 LBS

## 2021-06-01 DIAGNOSIS — C78.01 MALIGNANT NEOPLASM METASTATIC TO BOTH LUNGS: Primary | ICD-10-CM

## 2021-06-01 DIAGNOSIS — Z29.89 ENCOUNTER FOR IMMUNOTHERAPY: ICD-10-CM

## 2021-06-01 DIAGNOSIS — C78.02 MALIGNANT NEOPLASM METASTATIC TO BOTH LUNGS: Primary | ICD-10-CM

## 2021-06-01 DIAGNOSIS — C67.9 MALIGNANT NEOPLASM OF URINARY BLADDER, UNSPECIFIED SITE: ICD-10-CM

## 2021-06-01 PROCEDURE — 1125F PR PAIN SEVERITY QUANTIFIED, PAIN PRESENT: ICD-10-PCS | Mod: S$GLB,,, | Performed by: NURSE PRACTITIONER

## 2021-06-01 PROCEDURE — 1125F AMNT PAIN NOTED PAIN PRSNT: CPT | Mod: S$GLB,,, | Performed by: NURSE PRACTITIONER

## 2021-06-01 PROCEDURE — 1101F PT FALLS ASSESS-DOCD LE1/YR: CPT | Mod: CPTII,S$GLB,, | Performed by: NURSE PRACTITIONER

## 2021-06-01 PROCEDURE — 99214 OFFICE O/P EST MOD 30 MIN: CPT | Mod: S$GLB,,, | Performed by: NURSE PRACTITIONER

## 2021-06-01 PROCEDURE — 99214 PR OFFICE/OUTPT VISIT, EST, LEVL IV, 30-39 MIN: ICD-10-PCS | Mod: S$GLB,,, | Performed by: NURSE PRACTITIONER

## 2021-06-01 PROCEDURE — 3288F PR FALLS RISK ASSESSMENT DOCUMENTED: ICD-10-PCS | Mod: CPTII,S$GLB,, | Performed by: NURSE PRACTITIONER

## 2021-06-01 PROCEDURE — 3288F FALL RISK ASSESSMENT DOCD: CPT | Mod: CPTII,S$GLB,, | Performed by: NURSE PRACTITIONER

## 2021-06-01 PROCEDURE — 1159F PR MEDICATION LIST DOCUMENTED IN MEDICAL RECORD: ICD-10-PCS | Mod: S$GLB,,, | Performed by: NURSE PRACTITIONER

## 2021-06-01 PROCEDURE — 1101F PR PT FALLS ASSESS DOC 0-1 FALLS W/OUT INJ PAST YR: ICD-10-PCS | Mod: CPTII,S$GLB,, | Performed by: NURSE PRACTITIONER

## 2021-06-01 PROCEDURE — 1159F MED LIST DOCD IN RCRD: CPT | Mod: S$GLB,,, | Performed by: NURSE PRACTITIONER

## 2021-06-01 RX ORDER — SODIUM CHLORIDE 0.9 % (FLUSH) 0.9 %
10 SYRINGE (ML) INJECTION
Status: CANCELLED | OUTPATIENT
Start: 2021-06-02

## 2021-06-01 RX ORDER — HEPARIN 100 UNIT/ML
500 SYRINGE INTRAVENOUS
Status: CANCELLED | OUTPATIENT
Start: 2021-06-02

## 2021-06-22 ENCOUNTER — OFFICE VISIT (OUTPATIENT)
Dept: HEMATOLOGY/ONCOLOGY | Facility: CLINIC | Age: 86
End: 2021-06-22
Payer: COMMERCIAL

## 2021-06-22 VITALS
SYSTOLIC BLOOD PRESSURE: 175 MMHG | DIASTOLIC BLOOD PRESSURE: 90 MMHG | BODY MASS INDEX: 27.97 KG/M2 | TEMPERATURE: 98 F | OXYGEN SATURATION: 96 % | HEIGHT: 63 IN | WEIGHT: 157.88 LBS | HEART RATE: 90 BPM | RESPIRATION RATE: 18 BRPM

## 2021-06-22 DIAGNOSIS — C78.02 MALIGNANT NEOPLASM METASTATIC TO BOTH LUNGS: ICD-10-CM

## 2021-06-22 DIAGNOSIS — C67.9 MALIGNANT NEOPLASM OF URINARY BLADDER, UNSPECIFIED SITE: Primary | ICD-10-CM

## 2021-06-22 DIAGNOSIS — C78.01 MALIGNANT NEOPLASM METASTATIC TO BOTH LUNGS: ICD-10-CM

## 2021-06-22 PROCEDURE — 1159F PR MEDICATION LIST DOCUMENTED IN MEDICAL RECORD: ICD-10-PCS | Mod: S$GLB,,, | Performed by: INTERNAL MEDICINE

## 2021-06-22 PROCEDURE — 99215 PR OFFICE/OUTPT VISIT, EST, LEVL V, 40-54 MIN: ICD-10-PCS | Mod: S$GLB,,, | Performed by: INTERNAL MEDICINE

## 2021-06-22 PROCEDURE — 1101F PT FALLS ASSESS-DOCD LE1/YR: CPT | Mod: CPTII,S$GLB,, | Performed by: INTERNAL MEDICINE

## 2021-06-22 PROCEDURE — 3288F FALL RISK ASSESSMENT DOCD: CPT | Mod: CPTII,S$GLB,, | Performed by: INTERNAL MEDICINE

## 2021-06-22 PROCEDURE — 1101F PR PT FALLS ASSESS DOC 0-1 FALLS W/OUT INJ PAST YR: ICD-10-PCS | Mod: CPTII,S$GLB,, | Performed by: INTERNAL MEDICINE

## 2021-06-22 PROCEDURE — 1126F AMNT PAIN NOTED NONE PRSNT: CPT | Mod: S$GLB,,, | Performed by: INTERNAL MEDICINE

## 2021-06-22 PROCEDURE — 3288F PR FALLS RISK ASSESSMENT DOCUMENTED: ICD-10-PCS | Mod: CPTII,S$GLB,, | Performed by: INTERNAL MEDICINE

## 2021-06-22 PROCEDURE — 1126F PR PAIN SEVERITY QUANTIFIED, NO PAIN PRESENT: ICD-10-PCS | Mod: S$GLB,,, | Performed by: INTERNAL MEDICINE

## 2021-06-22 PROCEDURE — 1159F MED LIST DOCD IN RCRD: CPT | Mod: S$GLB,,, | Performed by: INTERNAL MEDICINE

## 2021-06-22 PROCEDURE — 99215 OFFICE O/P EST HI 40 MIN: CPT | Mod: S$GLB,,, | Performed by: INTERNAL MEDICINE

## 2021-06-22 RX ORDER — HEPARIN 100 UNIT/ML
500 SYRINGE INTRAVENOUS
Status: CANCELLED | OUTPATIENT
Start: 2021-06-23

## 2021-06-22 RX ORDER — SODIUM CHLORIDE 0.9 % (FLUSH) 0.9 %
10 SYRINGE (ML) INJECTION
Status: CANCELLED | OUTPATIENT
Start: 2021-06-23

## 2021-07-13 ENCOUNTER — OFFICE VISIT (OUTPATIENT)
Dept: HEMATOLOGY/ONCOLOGY | Facility: CLINIC | Age: 86
End: 2021-07-13
Payer: COMMERCIAL

## 2021-07-13 VITALS
WEIGHT: 157.13 LBS | OXYGEN SATURATION: 97 % | HEART RATE: 60 BPM | TEMPERATURE: 97 F | SYSTOLIC BLOOD PRESSURE: 195 MMHG | BODY MASS INDEX: 27.84 KG/M2 | HEIGHT: 63 IN | RESPIRATION RATE: 18 BRPM | DIASTOLIC BLOOD PRESSURE: 92 MMHG

## 2021-07-13 DIAGNOSIS — C67.9 MALIGNANT NEOPLASM OF URINARY BLADDER, UNSPECIFIED SITE: Primary | ICD-10-CM

## 2021-07-13 DIAGNOSIS — E11.9 TYPE 2 DIABETES MELLITUS WITHOUT COMPLICATION, WITHOUT LONG-TERM CURRENT USE OF INSULIN: ICD-10-CM

## 2021-07-13 PROCEDURE — 1159F MED LIST DOCD IN RCRD: CPT | Mod: S$GLB,,, | Performed by: INTERNAL MEDICINE

## 2021-07-13 PROCEDURE — 1159F PR MEDICATION LIST DOCUMENTED IN MEDICAL RECORD: ICD-10-PCS | Mod: S$GLB,,, | Performed by: INTERNAL MEDICINE

## 2021-07-13 PROCEDURE — 1101F PR PT FALLS ASSESS DOC 0-1 FALLS W/OUT INJ PAST YR: ICD-10-PCS | Mod: CPTII,S$GLB,, | Performed by: INTERNAL MEDICINE

## 2021-07-13 PROCEDURE — 3288F PR FALLS RISK ASSESSMENT DOCUMENTED: ICD-10-PCS | Mod: CPTII,S$GLB,, | Performed by: INTERNAL MEDICINE

## 2021-07-13 PROCEDURE — 99215 OFFICE O/P EST HI 40 MIN: CPT | Mod: S$GLB,,, | Performed by: INTERNAL MEDICINE

## 2021-07-13 PROCEDURE — 99215 PR OFFICE/OUTPT VISIT, EST, LEVL V, 40-54 MIN: ICD-10-PCS | Mod: S$GLB,,, | Performed by: INTERNAL MEDICINE

## 2021-07-13 PROCEDURE — 1126F AMNT PAIN NOTED NONE PRSNT: CPT | Mod: S$GLB,,, | Performed by: INTERNAL MEDICINE

## 2021-07-13 PROCEDURE — 1126F PR PAIN SEVERITY QUANTIFIED, NO PAIN PRESENT: ICD-10-PCS | Mod: S$GLB,,, | Performed by: INTERNAL MEDICINE

## 2021-07-13 PROCEDURE — 3288F FALL RISK ASSESSMENT DOCD: CPT | Mod: CPTII,S$GLB,, | Performed by: INTERNAL MEDICINE

## 2021-07-13 PROCEDURE — 1101F PT FALLS ASSESS-DOCD LE1/YR: CPT | Mod: CPTII,S$GLB,, | Performed by: INTERNAL MEDICINE

## 2021-07-13 RX ORDER — HEPARIN 100 UNIT/ML
500 SYRINGE INTRAVENOUS
Status: CANCELLED | OUTPATIENT
Start: 2021-07-14

## 2021-07-13 RX ORDER — SODIUM CHLORIDE 0.9 % (FLUSH) 0.9 %
10 SYRINGE (ML) INJECTION
Status: CANCELLED | OUTPATIENT
Start: 2021-07-14

## 2021-07-23 ENCOUNTER — TELEPHONE (OUTPATIENT)
Dept: HEMATOLOGY/ONCOLOGY | Facility: CLINIC | Age: 86
End: 2021-07-23

## 2021-08-03 ENCOUNTER — OFFICE VISIT (OUTPATIENT)
Dept: HEMATOLOGY/ONCOLOGY | Facility: CLINIC | Age: 86
End: 2021-08-03

## 2021-08-03 VITALS
DIASTOLIC BLOOD PRESSURE: 84 MMHG | WEIGHT: 158 LBS | OXYGEN SATURATION: 93 % | TEMPERATURE: 97 F | SYSTOLIC BLOOD PRESSURE: 180 MMHG | BODY MASS INDEX: 28 KG/M2 | RESPIRATION RATE: 18 BRPM | HEART RATE: 84 BPM | HEIGHT: 63 IN

## 2021-08-03 DIAGNOSIS — Z51.11 ENCOUNTER FOR ANTINEOPLASTIC CHEMOTHERAPY: ICD-10-CM

## 2021-08-03 DIAGNOSIS — C67.9 MALIGNANT NEOPLASM OF URINARY BLADDER, UNSPECIFIED SITE: Primary | ICD-10-CM

## 2021-08-03 DIAGNOSIS — E11.9 TYPE 2 DIABETES MELLITUS WITHOUT COMPLICATION, WITHOUT LONG-TERM CURRENT USE OF INSULIN: ICD-10-CM

## 2021-08-03 LAB — TSH SERPL DL<=0.005 MIU/L-ACNC: 1.19 UIU/ML (ref 0.36–3.74)

## 2021-08-03 PROCEDURE — 1101F PT FALLS ASSESS-DOCD LE1/YR: CPT | Mod: CPTII,S$GLB,, | Performed by: INTERNAL MEDICINE

## 2021-08-03 PROCEDURE — 1126F PR PAIN SEVERITY QUANTIFIED, NO PAIN PRESENT: ICD-10-PCS | Mod: CPTII,S$GLB,, | Performed by: INTERNAL MEDICINE

## 2021-08-03 PROCEDURE — 3288F FALL RISK ASSESSMENT DOCD: CPT | Mod: CPTII,S$GLB,, | Performed by: INTERNAL MEDICINE

## 2021-08-03 PROCEDURE — 99215 OFFICE O/P EST HI 40 MIN: CPT | Mod: S$GLB,,, | Performed by: INTERNAL MEDICINE

## 2021-08-03 PROCEDURE — 99215 PR OFFICE/OUTPT VISIT, EST, LEVL V, 40-54 MIN: ICD-10-PCS | Mod: S$GLB,,, | Performed by: INTERNAL MEDICINE

## 2021-08-03 PROCEDURE — 1101F PR PT FALLS ASSESS DOC 0-1 FALLS W/OUT INJ PAST YR: ICD-10-PCS | Mod: CPTII,S$GLB,, | Performed by: INTERNAL MEDICINE

## 2021-08-03 PROCEDURE — 3288F PR FALLS RISK ASSESSMENT DOCUMENTED: ICD-10-PCS | Mod: CPTII,S$GLB,, | Performed by: INTERNAL MEDICINE

## 2021-08-03 PROCEDURE — 1126F AMNT PAIN NOTED NONE PRSNT: CPT | Mod: CPTII,S$GLB,, | Performed by: INTERNAL MEDICINE

## 2021-08-03 PROCEDURE — 1159F MED LIST DOCD IN RCRD: CPT | Mod: CPTII,S$GLB,, | Performed by: INTERNAL MEDICINE

## 2021-08-03 PROCEDURE — 1159F PR MEDICATION LIST DOCUMENTED IN MEDICAL RECORD: ICD-10-PCS | Mod: CPTII,S$GLB,, | Performed by: INTERNAL MEDICINE

## 2021-08-03 RX ORDER — HEPARIN 100 UNIT/ML
500 SYRINGE INTRAVENOUS
Status: CANCELLED | OUTPATIENT
Start: 2021-08-05

## 2021-08-03 RX ORDER — SODIUM CHLORIDE 0.9 % (FLUSH) 0.9 %
10 SYRINGE (ML) INJECTION
Status: CANCELLED | OUTPATIENT
Start: 2021-08-05

## 2021-08-24 ENCOUNTER — TELEPHONE (OUTPATIENT)
Dept: HEMATOLOGY/ONCOLOGY | Facility: CLINIC | Age: 86
End: 2021-08-24

## 2021-09-13 ENCOUNTER — TELEPHONE (OUTPATIENT)
Dept: HEMATOLOGY/ONCOLOGY | Facility: CLINIC | Age: 86
End: 2021-09-13

## 2021-09-13 DIAGNOSIS — R91.1 SOLITARY PULMONARY NODULE: ICD-10-CM

## 2021-09-14 ENCOUNTER — OFFICE VISIT (OUTPATIENT)
Dept: HEMATOLOGY/ONCOLOGY | Facility: CLINIC | Age: 86
End: 2021-09-14

## 2021-09-14 VITALS
TEMPERATURE: 97 F | BODY MASS INDEX: 27.49 KG/M2 | WEIGHT: 155.13 LBS | OXYGEN SATURATION: 96 % | HEART RATE: 99 BPM | HEIGHT: 63 IN | RESPIRATION RATE: 18 BRPM | DIASTOLIC BLOOD PRESSURE: 83 MMHG | SYSTOLIC BLOOD PRESSURE: 180 MMHG

## 2021-09-14 DIAGNOSIS — C67.9 MALIGNANT NEOPLASM OF URINARY BLADDER, UNSPECIFIED SITE: Primary | ICD-10-CM

## 2021-09-14 PROCEDURE — 1101F PR PT FALLS ASSESS DOC 0-1 FALLS W/OUT INJ PAST YR: ICD-10-PCS | Mod: CPTII,S$GLB,, | Performed by: INTERNAL MEDICINE

## 2021-09-14 PROCEDURE — 1159F PR MEDICATION LIST DOCUMENTED IN MEDICAL RECORD: ICD-10-PCS | Mod: CPTII,S$GLB,, | Performed by: INTERNAL MEDICINE

## 2021-09-14 PROCEDURE — 3288F PR FALLS RISK ASSESSMENT DOCUMENTED: ICD-10-PCS | Mod: CPTII,S$GLB,, | Performed by: INTERNAL MEDICINE

## 2021-09-14 PROCEDURE — 99215 OFFICE O/P EST HI 40 MIN: CPT | Mod: S$GLB,,, | Performed by: INTERNAL MEDICINE

## 2021-09-14 PROCEDURE — 3288F FALL RISK ASSESSMENT DOCD: CPT | Mod: CPTII,S$GLB,, | Performed by: INTERNAL MEDICINE

## 2021-09-14 PROCEDURE — 1126F AMNT PAIN NOTED NONE PRSNT: CPT | Mod: CPTII,S$GLB,, | Performed by: INTERNAL MEDICINE

## 2021-09-14 PROCEDURE — 1159F MED LIST DOCD IN RCRD: CPT | Mod: CPTII,S$GLB,, | Performed by: INTERNAL MEDICINE

## 2021-09-14 PROCEDURE — 1101F PT FALLS ASSESS-DOCD LE1/YR: CPT | Mod: CPTII,S$GLB,, | Performed by: INTERNAL MEDICINE

## 2021-09-14 PROCEDURE — 1126F PR PAIN SEVERITY QUANTIFIED, NO PAIN PRESENT: ICD-10-PCS | Mod: CPTII,S$GLB,, | Performed by: INTERNAL MEDICINE

## 2021-09-14 PROCEDURE — 99215 PR OFFICE/OUTPT VISIT, EST, LEVL V, 40-54 MIN: ICD-10-PCS | Mod: S$GLB,,, | Performed by: INTERNAL MEDICINE

## 2021-09-14 RX ORDER — SODIUM CHLORIDE 0.9 % (FLUSH) 0.9 %
10 SYRINGE (ML) INJECTION
Status: CANCELLED | OUTPATIENT
Start: 2021-09-15

## 2021-09-14 RX ORDER — HEPARIN 100 UNIT/ML
500 SYRINGE INTRAVENOUS
Status: CANCELLED | OUTPATIENT
Start: 2021-09-14

## 2021-09-14 RX ORDER — SODIUM CHLORIDE 0.9 % (FLUSH) 0.9 %
10 SYRINGE (ML) INJECTION
Status: CANCELLED | OUTPATIENT
Start: 2021-09-14

## 2021-09-14 RX ORDER — HEPARIN 100 UNIT/ML
500 SYRINGE INTRAVENOUS
Status: CANCELLED | OUTPATIENT
Start: 2021-09-15

## 2021-09-15 ENCOUNTER — TELEPHONE (OUTPATIENT)
Dept: HEMATOLOGY/ONCOLOGY | Facility: CLINIC | Age: 86
End: 2021-09-15

## 2021-09-16 ENCOUNTER — TELEPHONE (OUTPATIENT)
Dept: HEMATOLOGY/ONCOLOGY | Facility: CLINIC | Age: 86
End: 2021-09-16

## 2021-09-17 ENCOUNTER — TELEPHONE (OUTPATIENT)
Dept: HEMATOLOGY/ONCOLOGY | Facility: CLINIC | Age: 86
End: 2021-09-17

## 2021-10-12 ENCOUNTER — TELEPHONE (OUTPATIENT)
Dept: HEMATOLOGY/ONCOLOGY | Facility: CLINIC | Age: 86
End: 2021-10-12

## 2021-10-12 ENCOUNTER — OFFICE VISIT (OUTPATIENT)
Dept: HEMATOLOGY/ONCOLOGY | Facility: CLINIC | Age: 86
End: 2021-10-12

## 2021-10-12 VITALS
HEIGHT: 63 IN | HEART RATE: 79 BPM | TEMPERATURE: 97 F | BODY MASS INDEX: 27.11 KG/M2 | OXYGEN SATURATION: 94 % | RESPIRATION RATE: 16 BRPM | DIASTOLIC BLOOD PRESSURE: 85 MMHG | WEIGHT: 153 LBS | SYSTOLIC BLOOD PRESSURE: 181 MMHG

## 2021-10-12 DIAGNOSIS — E03.9 HYPOTHYROIDISM, UNSPECIFIED TYPE: ICD-10-CM

## 2021-10-12 DIAGNOSIS — E11.9 TYPE 2 DIABETES MELLITUS WITHOUT COMPLICATION, WITHOUT LONG-TERM CURRENT USE OF INSULIN: ICD-10-CM

## 2021-10-12 DIAGNOSIS — R91.1 SOLITARY PULMONARY NODULE: ICD-10-CM

## 2021-10-12 DIAGNOSIS — I10 ESSENTIAL HYPERTENSION: ICD-10-CM

## 2021-10-12 DIAGNOSIS — C67.9 MALIGNANT NEOPLASM OF URINARY BLADDER, UNSPECIFIED SITE: Primary | ICD-10-CM

## 2021-10-12 LAB
ALBUMIN SERPL BCP-MCNC: 3.5 G/DL (ref 3.4–5)
ALBUMIN/GLOBULIN RATIO: 1 RATIO (ref 1.1–1.8)
ALP SERPL-CCNC: 42 U/L (ref 46–116)
ALT SERPL W P-5'-P-CCNC: 21 U/L (ref 12–78)
ANION GAP SERPL CALC-SCNC: 6 MMOL/L (ref 3–11)
AST SERPL-CCNC: 20 U/L (ref 15–37)
BASOPHILS NFR BLD: 0.6 % (ref 0–3)
BILIRUB SERPL-MCNC: 0.3 MG/DL (ref 0–1)
BUN SERPL-MCNC: 20 MG/DL (ref 7–18)
BUN/CREAT SERPL: 17.54 RATIO (ref 7–18)
CALCIUM SERPL-MCNC: 9 MG/DL (ref 8.8–10.5)
CHLORIDE SERPL-SCNC: 100 MMOL/L (ref 100–108)
CO2 SERPL-SCNC: 30 MMOL/L (ref 21–32)
CREAT SERPL-MCNC: 1.14 MG/DL (ref 0.55–1.02)
EOSINOPHIL NFR BLD: 5.1 % (ref 1–3)
ERYTHROCYTE [DISTWIDTH] IN BLOOD BY AUTOMATED COUNT: 13.5 % (ref 12.5–18)
GFR ESTIMATION: 54
GLOBULIN: 3.5 G/DL (ref 2.3–3.5)
GLUCOSE SERPL-MCNC: 164 MG/DL (ref 70–110)
HCT VFR BLD AUTO: 37.2 % (ref 37–47)
HGB BLD-MCNC: 11.9 G/DL (ref 12–16)
LYMPHOCYTES NFR BLD: 22.9 % (ref 25–40)
MCH RBC QN AUTO: 28.7 PG (ref 27–31.2)
MCHC RBC AUTO-ENTMCNC: 32 G/DL (ref 31.8–35.4)
MCV RBC AUTO: 89.9 FL (ref 80–97)
MONOCYTES NFR BLD: 12.9 % (ref 1–15)
NEUTROPHILS # BLD AUTO: 2.04 10*3/UL (ref 1.8–7.7)
NEUTROPHILS NFR BLD: 58.2 % (ref 37–80)
NUCLEATED RED BLOOD CELLS: 0 %
PLATELETS: 183 10*3/UL (ref 142–424)
POTASSIUM SERPL-SCNC: 4.4 MMOL/L (ref 3.6–5.2)
PROT SERPL-MCNC: 7 G/DL (ref 6.4–8.2)
RBC # BLD AUTO: 4.14 10*6/UL (ref 4.2–5.4)
SODIUM BLD-SCNC: 136 MMOL/L (ref 135–145)
TSH SERPL DL<=0.005 MIU/L-ACNC: 0.6 UIU/ML (ref 0.36–3.74)
TSH SERPL DL<=0.005 MIU/L-ACNC: 0.6 UIU/ML (ref 0.36–3.74)
WBC # BLD: 3.5 10*3/UL (ref 4.6–10.2)

## 2021-10-12 PROCEDURE — 1101F PR PT FALLS ASSESS DOC 0-1 FALLS W/OUT INJ PAST YR: ICD-10-PCS | Mod: CPTII,S$GLB,, | Performed by: INTERNAL MEDICINE

## 2021-10-12 PROCEDURE — 1101F PT FALLS ASSESS-DOCD LE1/YR: CPT | Mod: CPTII,S$GLB,, | Performed by: INTERNAL MEDICINE

## 2021-10-12 PROCEDURE — 1159F MED LIST DOCD IN RCRD: CPT | Mod: CPTII,S$GLB,, | Performed by: INTERNAL MEDICINE

## 2021-10-12 PROCEDURE — 1126F PR PAIN SEVERITY QUANTIFIED, NO PAIN PRESENT: ICD-10-PCS | Mod: CPTII,S$GLB,, | Performed by: INTERNAL MEDICINE

## 2021-10-12 PROCEDURE — 1126F AMNT PAIN NOTED NONE PRSNT: CPT | Mod: CPTII,S$GLB,, | Performed by: INTERNAL MEDICINE

## 2021-10-12 PROCEDURE — 99215 PR OFFICE/OUTPT VISIT, EST, LEVL V, 40-54 MIN: ICD-10-PCS | Mod: S$GLB,,, | Performed by: INTERNAL MEDICINE

## 2021-10-12 PROCEDURE — 99215 OFFICE O/P EST HI 40 MIN: CPT | Mod: S$GLB,,, | Performed by: INTERNAL MEDICINE

## 2021-10-12 PROCEDURE — 3288F PR FALLS RISK ASSESSMENT DOCUMENTED: ICD-10-PCS | Mod: CPTII,S$GLB,, | Performed by: INTERNAL MEDICINE

## 2021-10-12 PROCEDURE — 1159F PR MEDICATION LIST DOCUMENTED IN MEDICAL RECORD: ICD-10-PCS | Mod: CPTII,S$GLB,, | Performed by: INTERNAL MEDICINE

## 2021-10-12 PROCEDURE — 3288F FALL RISK ASSESSMENT DOCD: CPT | Mod: CPTII,S$GLB,, | Performed by: INTERNAL MEDICINE

## 2021-10-12 RX ORDER — CLOPIDOGREL BISULFATE 75 MG/1
TABLET ORAL
COMMUNITY
Start: 2021-09-29

## 2021-10-14 RX ORDER — SODIUM CHLORIDE 0.9 % (FLUSH) 0.9 %
10 SYRINGE (ML) INJECTION
Status: CANCELLED | OUTPATIENT
Start: 2021-10-14

## 2021-10-14 RX ORDER — HEPARIN 100 UNIT/ML
500 SYRINGE INTRAVENOUS
Status: CANCELLED | OUTPATIENT
Start: 2021-10-14

## 2021-11-02 ENCOUNTER — OFFICE VISIT (OUTPATIENT)
Dept: HEMATOLOGY/ONCOLOGY | Facility: CLINIC | Age: 86
End: 2021-11-02
Payer: COMMERCIAL

## 2021-11-02 VITALS
BODY MASS INDEX: 27.75 KG/M2 | RESPIRATION RATE: 18 BRPM | HEART RATE: 84 BPM | WEIGHT: 156.63 LBS | SYSTOLIC BLOOD PRESSURE: 188 MMHG | DIASTOLIC BLOOD PRESSURE: 101 MMHG | TEMPERATURE: 97 F | OXYGEN SATURATION: 91 % | HEIGHT: 63 IN

## 2021-11-02 DIAGNOSIS — C78.01 MALIGNANT NEOPLASM METASTATIC TO BOTH LUNGS: ICD-10-CM

## 2021-11-02 DIAGNOSIS — C78.02 MALIGNANT NEOPLASM METASTATIC TO BOTH LUNGS: ICD-10-CM

## 2021-11-02 DIAGNOSIS — C67.9 MALIGNANT NEOPLASM OF URINARY BLADDER, UNSPECIFIED SITE: Primary | ICD-10-CM

## 2021-11-02 DIAGNOSIS — Z29.89 ENCOUNTER FOR IMMUNOTHERAPY: ICD-10-CM

## 2021-11-02 DIAGNOSIS — R73.9 ELEVATED BLOOD SUGAR: ICD-10-CM

## 2021-11-02 LAB
ALBUMIN SERPL BCP-MCNC: 3.5 G/DL (ref 3.4–5)
ALBUMIN/GLOBULIN RATIO: 1.03 RATIO (ref 1.1–1.8)
ALP SERPL-CCNC: 50 U/L (ref 46–116)
ALT SERPL W P-5'-P-CCNC: 15 U/L (ref 12–78)
ANION GAP SERPL CALC-SCNC: 8 MMOL/L (ref 3–11)
AST SERPL-CCNC: 16 U/L (ref 15–37)
BILIRUB SERPL-MCNC: 0.3 MG/DL (ref 0–1)
BUN SERPL-MCNC: 17 MG/DL (ref 7–18)
BUN/CREAT SERPL: 17.89 RATIO (ref 7–18)
CALCIUM SERPL-MCNC: 9 MG/DL (ref 8.8–10.5)
CELLS COUNTED: 100
CHLORIDE SERPL-SCNC: 103 MMOL/L (ref 100–108)
CO2 SERPL-SCNC: 29 MMOL/L (ref 21–32)
CREAT SERPL-MCNC: 0.95 MG/DL (ref 0.55–1.02)
EOSINOPHIL NFR BLD: 7 % (ref 1–3)
ERYTHROCYTE [DISTWIDTH] IN BLOOD BY AUTOMATED COUNT: 14.1 % (ref 12.5–18)
GFR ESTIMATION: > 60
GLOBULIN: 3.4 G/DL (ref 2.3–3.5)
GLUCOSE SERPL-MCNC: 153 MG/DL (ref 70–110)
HCT VFR BLD AUTO: 37.2 % (ref 37–47)
HGB BLD-MCNC: 11.8 G/DL (ref 12–16)
LYMPHOCYTES NFR BLD: 24 % (ref 25–40)
MCH RBC QN AUTO: 28.8 PG (ref 27–31.2)
MCHC RBC AUTO-ENTMCNC: 31.7 G/DL (ref 31.8–35.4)
MCV RBC AUTO: 90.7 FL (ref 80–97)
MONOCYTES NFR BLD: 15 % (ref 1–15)
NEUTROPHILS # BLD AUTO: 2.2 10*3/UL (ref 1.8–7.7)
NEUTROPHILS NFR BLD: 54 % (ref 37–80)
NUCLEATED RED BLOOD CELLS: 0 %
PLATELETS: 210 10*3/UL (ref 142–424)
POTASSIUM SERPL-SCNC: 4.1 MMOL/L (ref 3.6–5.2)
PROT SERPL-MCNC: 6.9 G/DL (ref 6.4–8.2)
RBC # BLD AUTO: 4.1 10*6/UL (ref 4.2–5.4)
RBC MORPH BLD: ABNORMAL
SMALL PLATELETS BLD QL SMEAR: ADEQUATE
SODIUM BLD-SCNC: 140 MMOL/L (ref 135–145)
WBC # BLD: 4 10*3/UL (ref 4.6–10.2)

## 2021-11-02 PROCEDURE — 1126F PR PAIN SEVERITY QUANTIFIED, NO PAIN PRESENT: ICD-10-PCS | Mod: CPTII,S$GLB,, | Performed by: NURSE PRACTITIONER

## 2021-11-02 PROCEDURE — 3288F FALL RISK ASSESSMENT DOCD: CPT | Mod: CPTII,S$GLB,, | Performed by: NURSE PRACTITIONER

## 2021-11-02 PROCEDURE — 99214 PR OFFICE/OUTPT VISIT, EST, LEVL IV, 30-39 MIN: ICD-10-PCS | Mod: S$GLB,,, | Performed by: NURSE PRACTITIONER

## 2021-11-02 PROCEDURE — 1159F PR MEDICATION LIST DOCUMENTED IN MEDICAL RECORD: ICD-10-PCS | Mod: CPTII,S$GLB,, | Performed by: NURSE PRACTITIONER

## 2021-11-02 PROCEDURE — 1101F PT FALLS ASSESS-DOCD LE1/YR: CPT | Mod: CPTII,S$GLB,, | Performed by: NURSE PRACTITIONER

## 2021-11-02 PROCEDURE — 99214 OFFICE O/P EST MOD 30 MIN: CPT | Mod: S$GLB,,, | Performed by: NURSE PRACTITIONER

## 2021-11-02 PROCEDURE — 1101F PR PT FALLS ASSESS DOC 0-1 FALLS W/OUT INJ PAST YR: ICD-10-PCS | Mod: CPTII,S$GLB,, | Performed by: NURSE PRACTITIONER

## 2021-11-02 PROCEDURE — 1126F AMNT PAIN NOTED NONE PRSNT: CPT | Mod: CPTII,S$GLB,, | Performed by: NURSE PRACTITIONER

## 2021-11-02 PROCEDURE — 3288F PR FALLS RISK ASSESSMENT DOCUMENTED: ICD-10-PCS | Mod: CPTII,S$GLB,, | Performed by: NURSE PRACTITIONER

## 2021-11-02 PROCEDURE — 1159F MED LIST DOCD IN RCRD: CPT | Mod: CPTII,S$GLB,, | Performed by: NURSE PRACTITIONER

## 2021-11-02 RX ORDER — HEPARIN 100 UNIT/ML
500 SYRINGE INTRAVENOUS
Status: CANCELLED | OUTPATIENT
Start: 2021-11-04

## 2021-11-02 RX ORDER — SODIUM CHLORIDE 0.9 % (FLUSH) 0.9 %
10 SYRINGE (ML) INJECTION
Status: CANCELLED | OUTPATIENT
Start: 2021-11-04

## 2021-11-12 ENCOUNTER — TELEPHONE (OUTPATIENT)
Dept: HEMATOLOGY/ONCOLOGY | Facility: CLINIC | Age: 86
End: 2021-11-12
Payer: COMMERCIAL

## 2021-12-02 ENCOUNTER — OFFICE VISIT (OUTPATIENT)
Dept: HEMATOLOGY/ONCOLOGY | Facility: CLINIC | Age: 86
End: 2021-12-02
Payer: COMMERCIAL

## 2021-12-02 VITALS
TEMPERATURE: 96 F | HEIGHT: 63 IN | DIASTOLIC BLOOD PRESSURE: 83 MMHG | HEART RATE: 84 BPM | WEIGHT: 159 LBS | OXYGEN SATURATION: 91 % | BODY MASS INDEX: 28.17 KG/M2 | SYSTOLIC BLOOD PRESSURE: 180 MMHG | RESPIRATION RATE: 16 BRPM

## 2021-12-02 DIAGNOSIS — Z29.89 ENCOUNTER FOR IMMUNOTHERAPY: ICD-10-CM

## 2021-12-02 DIAGNOSIS — C78.01 MALIGNANT NEOPLASM METASTATIC TO BOTH LUNGS: ICD-10-CM

## 2021-12-02 DIAGNOSIS — C67.9 MALIGNANT NEOPLASM OF URINARY BLADDER, UNSPECIFIED SITE: Primary | ICD-10-CM

## 2021-12-02 DIAGNOSIS — C78.02 MALIGNANT NEOPLASM METASTATIC TO BOTH LUNGS: ICD-10-CM

## 2021-12-02 LAB
ALBUMIN SERPL BCP-MCNC: 3.5 G/DL (ref 3.4–5)
ALBUMIN/GLOBULIN RATIO: 1.09 RATIO (ref 1.1–1.8)
ALP SERPL-CCNC: 59 U/L (ref 46–116)
ALT SERPL W P-5'-P-CCNC: 20 U/L (ref 12–78)
ANION GAP SERPL CALC-SCNC: 9 MMOL/L (ref 3–11)
AST SERPL-CCNC: 14 U/L (ref 15–37)
BANDS: 1 % (ref 0–5)
BILIRUB SERPL-MCNC: 0.3 MG/DL (ref 0–1)
BUN SERPL-MCNC: 18 MG/DL (ref 7–18)
BUN/CREAT SERPL: 19.56 RATIO (ref 7–18)
CALCIUM SERPL-MCNC: 9.5 MG/DL (ref 8.8–10.5)
CELLS COUNTED: 100
CHLORIDE SERPL-SCNC: 101 MMOL/L (ref 100–108)
CO2 SERPL-SCNC: 31 MMOL/L (ref 21–32)
CREAT SERPL-MCNC: 0.92 MG/DL (ref 0.55–1.02)
EOSINOPHIL NFR BLD: 5 % (ref 1–3)
ERYTHROCYTE [DISTWIDTH] IN BLOOD BY AUTOMATED COUNT: 14.2 % (ref 12.5–18)
GFR ESTIMATION: > 60
GLOBULIN: 3.2 G/DL (ref 2.3–3.5)
GLUCOSE SERPL-MCNC: 214 MG/DL (ref 70–110)
HCT VFR BLD AUTO: 36.9 % (ref 37–47)
HGB BLD-MCNC: 11.7 G/DL (ref 12–16)
LYMPHOCYTES NFR BLD: 28 % (ref 25–40)
MCH RBC QN AUTO: 28.7 PG (ref 27–31.2)
MCHC RBC AUTO-ENTMCNC: 31.7 G/DL (ref 31.8–35.4)
MCV RBC AUTO: 90.7 FL (ref 80–97)
MONOCYTES NFR BLD: 10 % (ref 1–15)
NEUTROPHILS # BLD AUTO: 1.9 10*3/UL (ref 1.8–7.7)
NEUTROPHILS NFR BLD: 56 % (ref 37–80)
NUCLEATED RED BLOOD CELLS: 0 %
PLATELETS: 217 10*3/UL (ref 142–424)
POTASSIUM SERPL-SCNC: 4.2 MMOL/L (ref 3.6–5.2)
PROT SERPL-MCNC: 6.7 G/DL (ref 6.4–8.2)
RBC # BLD AUTO: 4.07 10*6/UL (ref 4.2–5.4)
RBC MORPH BLD: ABNORMAL
SMALL PLATELETS BLD QL SMEAR: ADEQUATE
SODIUM BLD-SCNC: 141 MMOL/L (ref 135–145)
TSH SERPL DL<=0.005 MIU/L-ACNC: 0.94 UIU/ML (ref 0.36–3.74)
WBC # BLD: 3.4 10*3/UL (ref 4.6–10.2)

## 2021-12-02 PROCEDURE — 99214 PR OFFICE/OUTPT VISIT, EST, LEVL IV, 30-39 MIN: ICD-10-PCS | Mod: S$GLB,,, | Performed by: NURSE PRACTITIONER

## 2021-12-02 PROCEDURE — 99214 OFFICE O/P EST MOD 30 MIN: CPT | Mod: S$GLB,,, | Performed by: NURSE PRACTITIONER

## 2021-12-02 RX ORDER — HEPARIN 100 UNIT/ML
500 SYRINGE INTRAVENOUS
Status: CANCELLED | OUTPATIENT
Start: 2021-12-02

## 2021-12-02 RX ORDER — SODIUM CHLORIDE 0.9 % (FLUSH) 0.9 %
10 SYRINGE (ML) INJECTION
Status: CANCELLED | OUTPATIENT
Start: 2021-12-02

## 2021-12-17 DIAGNOSIS — C67.9 MALIGNANT NEOPLASM OF URINARY BLADDER, UNSPECIFIED SITE: Primary | ICD-10-CM

## 2021-12-21 ENCOUNTER — TELEPHONE (OUTPATIENT)
Dept: HEMATOLOGY/ONCOLOGY | Facility: CLINIC | Age: 86
End: 2021-12-21
Payer: COMMERCIAL

## 2021-12-22 ENCOUNTER — OFFICE VISIT (OUTPATIENT)
Dept: HEMATOLOGY/ONCOLOGY | Facility: CLINIC | Age: 86
End: 2021-12-22
Payer: COMMERCIAL

## 2021-12-22 VITALS
TEMPERATURE: 97 F | DIASTOLIC BLOOD PRESSURE: 81 MMHG | WEIGHT: 157.5 LBS | SYSTOLIC BLOOD PRESSURE: 175 MMHG | HEART RATE: 87 BPM | BODY MASS INDEX: 27.91 KG/M2 | HEIGHT: 63 IN | OXYGEN SATURATION: 95 % | RESPIRATION RATE: 18 BRPM

## 2021-12-22 DIAGNOSIS — C78.01 MALIGNANT NEOPLASM METASTATIC TO BOTH LUNGS: ICD-10-CM

## 2021-12-22 DIAGNOSIS — C78.02 MALIGNANT NEOPLASM METASTATIC TO BOTH LUNGS: ICD-10-CM

## 2021-12-22 DIAGNOSIS — Z29.89 ENCOUNTER FOR IMMUNOTHERAPY: ICD-10-CM

## 2021-12-22 DIAGNOSIS — C67.9 MALIGNANT NEOPLASM OF URINARY BLADDER, UNSPECIFIED SITE: Primary | ICD-10-CM

## 2021-12-22 LAB
ALBUMIN SERPL BCP-MCNC: 3.7 G/DL (ref 3.4–5)
ALBUMIN/GLOBULIN RATIO: 1.16 RATIO (ref 1.1–1.8)
ALP SERPL-CCNC: 52 U/L (ref 46–116)
ALT SERPL W P-5'-P-CCNC: 19 U/L (ref 12–78)
ANION GAP SERPL CALC-SCNC: 5 MMOL/L (ref 3–11)
AST SERPL-CCNC: 17 U/L (ref 15–37)
BILIRUB SERPL-MCNC: 0.3 MG/DL (ref 0–1)
BUN SERPL-MCNC: 25 MG/DL (ref 7–18)
BUN/CREAT SERPL: 22.12 RATIO (ref 7–18)
CALCIUM SERPL-MCNC: 9.5 MG/DL (ref 8.8–10.5)
CELLS COUNTED: 100
CHLORIDE SERPL-SCNC: 103 MMOL/L (ref 100–108)
CO2 SERPL-SCNC: 33 MMOL/L (ref 21–32)
CREAT SERPL-MCNC: 1.13 MG/DL (ref 0.55–1.02)
EOSINOPHIL NFR BLD: 3 % (ref 1–3)
ERYTHROCYTE [DISTWIDTH] IN BLOOD BY AUTOMATED COUNT: 14 % (ref 12.5–18)
GFR ESTIMATION: 55
GLOBULIN: 3.2 G/DL (ref 2.3–3.5)
GLUCOSE SERPL-MCNC: 192 MG/DL (ref 70–110)
HCT VFR BLD AUTO: 36.8 % (ref 37–47)
HGB BLD-MCNC: 11.7 G/DL (ref 12–16)
LYMPHOCYTES NFR BLD: 21 % (ref 25–40)
MCH RBC QN AUTO: 28.6 PG (ref 27–31.2)
MCHC RBC AUTO-ENTMCNC: 31.8 G/DL (ref 31.8–35.4)
MCV RBC AUTO: 90 FL (ref 80–97)
MONOCYTES NFR BLD: 15 % (ref 1–15)
NEUTROPHILS # BLD AUTO: 2.4 10*3/UL (ref 1.8–7.7)
NEUTROPHILS NFR BLD: 61 % (ref 37–80)
NUCLEATED RED BLOOD CELLS: 0 %
PLATELETS: 218 10*3/UL (ref 142–424)
POTASSIUM SERPL-SCNC: 4.4 MMOL/L (ref 3.6–5.2)
PROT SERPL-MCNC: 6.9 G/DL (ref 6.4–8.2)
RBC # BLD AUTO: 4.09 10*6/UL (ref 4.2–5.4)
RBC MORPH BLD: ABNORMAL
SMALL PLATELETS BLD QL SMEAR: ADEQUATE
SODIUM BLD-SCNC: 141 MMOL/L (ref 135–145)
TSH SERPL DL<=0.005 MIU/L-ACNC: 0.66 UIU/ML (ref 0.36–3.74)
WBC # BLD: 3.9 10*3/UL (ref 4.6–10.2)

## 2021-12-22 PROCEDURE — 1159F MED LIST DOCD IN RCRD: CPT | Mod: CPTII,S$GLB,, | Performed by: NURSE PRACTITIONER

## 2021-12-22 PROCEDURE — 1126F AMNT PAIN NOTED NONE PRSNT: CPT | Mod: CPTII,S$GLB,, | Performed by: NURSE PRACTITIONER

## 2021-12-22 PROCEDURE — 3288F PR FALLS RISK ASSESSMENT DOCUMENTED: ICD-10-PCS | Mod: CPTII,S$GLB,, | Performed by: NURSE PRACTITIONER

## 2021-12-22 PROCEDURE — 1159F PR MEDICATION LIST DOCUMENTED IN MEDICAL RECORD: ICD-10-PCS | Mod: CPTII,S$GLB,, | Performed by: NURSE PRACTITIONER

## 2021-12-22 PROCEDURE — 99214 OFFICE O/P EST MOD 30 MIN: CPT | Mod: S$GLB,,, | Performed by: NURSE PRACTITIONER

## 2021-12-22 PROCEDURE — 1101F PT FALLS ASSESS-DOCD LE1/YR: CPT | Mod: CPTII,S$GLB,, | Performed by: NURSE PRACTITIONER

## 2021-12-22 PROCEDURE — 1101F PR PT FALLS ASSESS DOC 0-1 FALLS W/OUT INJ PAST YR: ICD-10-PCS | Mod: CPTII,S$GLB,, | Performed by: NURSE PRACTITIONER

## 2021-12-22 PROCEDURE — 3288F FALL RISK ASSESSMENT DOCD: CPT | Mod: CPTII,S$GLB,, | Performed by: NURSE PRACTITIONER

## 2021-12-22 PROCEDURE — 99214 PR OFFICE/OUTPT VISIT, EST, LEVL IV, 30-39 MIN: ICD-10-PCS | Mod: S$GLB,,, | Performed by: NURSE PRACTITIONER

## 2021-12-22 PROCEDURE — 1126F PR PAIN SEVERITY QUANTIFIED, NO PAIN PRESENT: ICD-10-PCS | Mod: CPTII,S$GLB,, | Performed by: NURSE PRACTITIONER

## 2021-12-22 RX ORDER — SODIUM CHLORIDE 0.9 % (FLUSH) 0.9 %
10 SYRINGE (ML) INJECTION
Status: CANCELLED | OUTPATIENT
Start: 2021-12-22

## 2021-12-22 RX ORDER — HEPARIN 100 UNIT/ML
500 SYRINGE INTRAVENOUS
Status: CANCELLED | OUTPATIENT
Start: 2021-12-22

## 2022-01-11 ENCOUNTER — OFFICE VISIT (OUTPATIENT)
Dept: HEMATOLOGY/ONCOLOGY | Facility: CLINIC | Age: 87
End: 2022-01-11
Payer: COMMERCIAL

## 2022-01-11 VITALS
SYSTOLIC BLOOD PRESSURE: 196 MMHG | TEMPERATURE: 98 F | HEART RATE: 74 BPM | HEIGHT: 63 IN | BODY MASS INDEX: 28.17 KG/M2 | OXYGEN SATURATION: 92 % | WEIGHT: 159 LBS | DIASTOLIC BLOOD PRESSURE: 94 MMHG | RESPIRATION RATE: 18 BRPM

## 2022-01-11 DIAGNOSIS — Z29.89 ENCOUNTER FOR IMMUNOTHERAPY: ICD-10-CM

## 2022-01-11 DIAGNOSIS — C78.02 MALIGNANT NEOPLASM METASTATIC TO BOTH LUNGS: ICD-10-CM

## 2022-01-11 DIAGNOSIS — C78.01 MALIGNANT NEOPLASM METASTATIC TO BOTH LUNGS: ICD-10-CM

## 2022-01-11 DIAGNOSIS — C67.9 MALIGNANT NEOPLASM OF URINARY BLADDER, UNSPECIFIED SITE: Primary | ICD-10-CM

## 2022-01-11 LAB
ALBUMIN SERPL BCP-MCNC: 3.4 G/DL (ref 3.4–5)
ALBUMIN/GLOBULIN RATIO: 1.06 RATIO (ref 1.1–1.8)
ALP SERPL-CCNC: 51 U/L (ref 46–116)
ALT SERPL W P-5'-P-CCNC: 17 U/L (ref 12–78)
ANION GAP SERPL CALC-SCNC: 7 MMOL/L (ref 3–11)
AST SERPL-CCNC: 14 U/L (ref 15–37)
BANDS: 2 % (ref 0–5)
BILIRUB SERPL-MCNC: 0.3 MG/DL (ref 0–1)
BUN SERPL-MCNC: 19 MG/DL (ref 7–18)
BUN/CREAT SERPL: 17.75 RATIO (ref 7–18)
CALCIUM SERPL-MCNC: 8.8 MG/DL (ref 8.8–10.5)
CELLS COUNTED: 100
CHLORIDE SERPL-SCNC: 103 MMOL/L (ref 100–108)
CO2 SERPL-SCNC: 33 MMOL/L (ref 21–32)
CREAT SERPL-MCNC: 1.07 MG/DL (ref 0.55–1.02)
EOSINOPHIL NFR BLD: 2 % (ref 1–3)
ERYTHROCYTE [DISTWIDTH] IN BLOOD BY AUTOMATED COUNT: 13.7 % (ref 12.5–18)
GFR ESTIMATION: 59
GLOBULIN: 3.2 G/DL (ref 2.3–3.5)
GLUCOSE SERPL-MCNC: 196 MG/DL (ref 70–110)
HCT VFR BLD AUTO: 35.3 % (ref 37–47)
HGB BLD-MCNC: 11.2 G/DL (ref 12–16)
LYMPHOCYTES NFR BLD: 25 % (ref 25–40)
MCH RBC QN AUTO: 28.7 PG (ref 27–31.2)
MCHC RBC AUTO-ENTMCNC: 31.7 G/DL (ref 31.8–35.4)
MCV RBC AUTO: 90.5 FL (ref 80–97)
MONOCYTES NFR BLD: 15 % (ref 1–15)
NEUTROPHILS # BLD AUTO: 1.9 10*3/UL (ref 1.8–7.7)
NEUTROPHILS NFR BLD: 56 % (ref 37–80)
NUCLEATED RED BLOOD CELLS: 0 %
PLATELETS: 188 10*3/UL (ref 142–424)
POTASSIUM SERPL-SCNC: 4 MMOL/L (ref 3.6–5.2)
PROT SERPL-MCNC: 6.6 G/DL (ref 6.4–8.2)
RBC # BLD AUTO: 3.9 10*6/UL (ref 4.2–5.4)
RBC MORPH BLD: NORMAL
SMALL PLATELETS BLD QL SMEAR: ADEQUATE
SODIUM BLD-SCNC: 143 MMOL/L (ref 135–145)
TSH SERPL DL<=0.005 MIU/L-ACNC: 0.64 UIU/ML (ref 0.36–3.74)
WBC # BLD: 3.2 10*3/UL (ref 4.6–10.2)

## 2022-01-11 PROCEDURE — 1101F PR PT FALLS ASSESS DOC 0-1 FALLS W/OUT INJ PAST YR: ICD-10-PCS | Mod: CPTII,S$GLB,, | Performed by: NURSE PRACTITIONER

## 2022-01-11 PROCEDURE — 1101F PT FALLS ASSESS-DOCD LE1/YR: CPT | Mod: CPTII,S$GLB,, | Performed by: NURSE PRACTITIONER

## 2022-01-11 PROCEDURE — 1160F PR REVIEW ALL MEDS BY PRESCRIBER/CLIN PHARMACIST DOCUMENTED: ICD-10-PCS | Mod: CPTII,S$GLB,, | Performed by: NURSE PRACTITIONER

## 2022-01-11 PROCEDURE — 3288F PR FALLS RISK ASSESSMENT DOCUMENTED: ICD-10-PCS | Mod: CPTII,S$GLB,, | Performed by: NURSE PRACTITIONER

## 2022-01-11 PROCEDURE — 99214 OFFICE O/P EST MOD 30 MIN: CPT | Mod: S$GLB,,, | Performed by: NURSE PRACTITIONER

## 2022-01-11 PROCEDURE — 99214 PR OFFICE/OUTPT VISIT, EST, LEVL IV, 30-39 MIN: ICD-10-PCS | Mod: S$GLB,,, | Performed by: NURSE PRACTITIONER

## 2022-01-11 PROCEDURE — 1159F PR MEDICATION LIST DOCUMENTED IN MEDICAL RECORD: ICD-10-PCS | Mod: CPTII,S$GLB,, | Performed by: NURSE PRACTITIONER

## 2022-01-11 PROCEDURE — 1160F RVW MEDS BY RX/DR IN RCRD: CPT | Mod: CPTII,S$GLB,, | Performed by: NURSE PRACTITIONER

## 2022-01-11 PROCEDURE — 3288F FALL RISK ASSESSMENT DOCD: CPT | Mod: CPTII,S$GLB,, | Performed by: NURSE PRACTITIONER

## 2022-01-11 PROCEDURE — 1159F MED LIST DOCD IN RCRD: CPT | Mod: CPTII,S$GLB,, | Performed by: NURSE PRACTITIONER

## 2022-01-11 RX ORDER — SODIUM CHLORIDE 0.9 % (FLUSH) 0.9 %
10 SYRINGE (ML) INJECTION
Status: CANCELLED | OUTPATIENT
Start: 2022-01-12

## 2022-01-11 RX ORDER — HEPARIN 100 UNIT/ML
500 SYRINGE INTRAVENOUS
Status: CANCELLED | OUTPATIENT
Start: 2022-01-12

## 2022-01-11 NOTE — PROGRESS NOTES
Hematology Oncology Progress Note    Hematology Oncology  [facility]     PATIENT: Sherice Haji  : 1932 89 y.o. female  MRN [id number]     PCP: Sejal Castillo MD  Consult Requested By:  No att. providers found    Date of Service: 2022    Subjective:   Interim History:  Malignant neoplasm of urinary bladder, unspecified site    The patient doing well      Oncology History:    Sherice Haji is here for to followup for bladder cancer.  Current therapy patient doing well without new complaints she specifically denies hematuria or weight loss she has a good appetite.  This is a 87 y.o. female patient with a history of The primary encounter diagnosis was Malignant 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. Diagnoses of Essential hypertension and Type 2 diabetes mellitus without complication, without long-term current use of insulin were also pertinent to this visit., who is referred here for evaluation treatment of bladder cancer.  The patient has been feeling weak and for T for about 2 months.  Urinalysis shows hematuria or and she was referred to Urology.  CT scan with IV contrast demonstrates a bladder mass. Biopsy shows muscle invasive bladder cancer.     The patient lost about 10 lb over the past 2 months.  She denies any abdominal pain chest pain short of breath.     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 Constipation. Prior surgeries. Densely calcified coronary arteries.     ==11/2020 Keytruda restarted  ==5/2021 C scope  A scar as well as necrotic tissue identified in the left superior wall of the bladder consistent with the area of previous resection.  No evidence of recurrent disease.  Ureteral orifices of normal shape,  location and sizes effluxing clear urine.       Oncology History   Malignant neoplasm of urinary bladder   9/25/2019 Initial Diagnosis    Malignant 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     10/21/2019 Cancer Staged    Staging form: Urinary Bladder, AJCC 8th Edition  - Clinical: Stage II (cT2, cN0, cM0)     10/31/2019 - 12/11/2019 Chemotherapy    Treatment Summary   Plan Name: cisplatin taxol weekly  Treatment Goal: Curative  Status: Inactive  Start Date: 10/31/2019  End Date: 12/5/2019  Provider: Hoda Josue NP  Chemotherapy: CISplatin (PLATINOL) 20 mg/m2 = 34 mg in sodium chloride 0.9% 500 mL chemo infusion, 20 mg/m2 = 34 mg (100 % of original dose 20 mg/m2), Intravenous, Clinic/HOD 1 time, 6 of 6 cycles  Dose modification: 20 mg/m2 (original dose 20 mg/m2, Cycle 1)  PACLitaxel (TAXOL) 50 mg/m2 = 84 mg in sodium chloride 0.9% 500 mL chemo infusion, 50 mg/m2 = 84 mg (100 % of original dose 50 mg/m2), Intravenous, Clinic/HOD 1 time, 6 of 6 cycles  Dose modification: 50 mg/m2 (original dose 50 mg/m2, Cycle 1)     6/24/2020 - 6/24/2020 Chemotherapy    Treatment Summary   Plan Name: OP BLADDER GEMCITABINE CISPLATIN (SPLIT DOSE CISPLATIN) Q3W  Treatment Goal: Curative  Status: Inactive  Start Date:   End Date:   Provider: Misael Gordon MD  Chemotherapy: CISplatin (PLATINOL) 35 mg/m2 = 62 mg in sodium chloride 0.9% 500 mL chemo infusion, 35 mg/m2, Intravenous, Clinic/HOD 1 time, 0 of 4 cycles  gemcitabine (GEMZAR) 1,780 mg in sodium chloride 0.9% 250 mL chemo infusion, 1,000 mg/m2, Intravenous, Clinic/HOD 1 time, 0 of 4 cycles     11/11/2020 -   "Chemotherapy    Treatment Summary   Plan Name: OP PEMBROLIZUMAB   Treatment Goal: Control  Status: Active  Start Date: 11/11/2020  End Date: 4/6/2022 (Planned)  Provider: Misael Gordon MD  Chemotherapy: pembrolizumab (KEYTRUDA) 200 mg in sodium chloride 0.9% 100 mL chemo infusion, 200 mg, Intravenous, Clinic/HOD 1 time, 18 of 23 cycles  Dose modification: 400 mg (original dose 200 mg, Cycle 15), 200 mg (original dose 200 mg, Cycle 15), 200 mg (original dose 200 mg, Cycle 16), 200 mg (original dose 200 mg, Cycle 17)         Oncologic History:      Oncologic History     Oncologic Treatment     Pathology      Past Medical History:   Past Medical History:   Diagnosis Date    Absolute anemia 12/5/2019    Allergy     Anxiety     Arthritis     Bladder cancer     Cancer     Cataract     Removed in May 2019    Depression     Diabetes mellitus     Hypertension     Lung cancer     Malignant 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 9/25/2019    Type 2 diabetes mellitus without complication, without long-term current use of insulin 9/25/2019       Past Surgical HIstory:   Past Surgical History:   Procedure Laterality Date    BLADDER SURGERY      surgery on July 22 2020 to see if patients cancer was still present    BLADDER TUMOR EXCISION  09/2019    CATARACT EXTRACTION, BILATERAL  05/2019       Allergies:  Review of patient's allergies indicates:   Allergen Reactions    Aspirin        Medications:  Current Outpatient Medications   Medication Sig Dispense Refill    ACCU-CHEK FASTCLIX LANCET DRUM Misc       ACCU-CHEK SMARTVIEW CONTRL SOL Soln       ACCU-CHEK SMARTVIEW TEST STRIP Strp       amLODIPine (NORVASC) 2.5 MG tablet       BD ALCOHOL SWABS PadM       benzonatate (TESSALON) 200 MG capsule       clopidogreL (PLAVIX) 75 mg tablet       diphenoxylate-atropine 2.5-0.025 mg (LOMOTIL) 2.5-0.025 mg per tablet       DROPLET PEN NEEDLE 32 gauge x 5/32" Ndle       glimepiride (AMARYL) 2 MG tablet    "    HYDROcodone-acetaminophen (NORCO) 5-325 mg per tablet       hydrOXYzine HCL (ATARAX) 25 MG tablet Take 25 mg by mouth every 8 (eight) hours as needed.      hyoscyamine (LEVSIN/SL) 0.125 mg Subl       indapamide (LOZOL) 2.5 MG Tab       LANTUS SOLOSTAR U-100 INSULIN glargine 100 units/mL (3mL) SubQ pen       levothyroxine (SYNTHROID) 75 MCG tablet       loratadine (CLARITIN) 10 mg tablet       losartan (COZAAR) 25 MG tablet       meclizine (ANTIVERT) 12.5 mg tablet       meloxicam (MOBIC) 7.5 MG tablet       metFORMIN (GLUCOPHAGE-XR) 500 MG 24 hr tablet       metoprolol tartrate (LOPRESSOR) 50 MG tablet       simvastatin (ZOCOR) 20 MG tablet       traMADoL (ULTRAM) 50 mg tablet       traZODone (DESYREL) 50 MG tablet        No current facility-administered medications for this visit.       Family History:   Family History   Problem Relation Age of Onset    No Known Problems Mother     No Known Problems Father     Breast cancer Sister     Prostate cancer Brother     No Known Problems Maternal Aunt     No Known Problems Maternal Uncle     No Known Problems Paternal Aunt     No Known Problems Paternal Uncle     No Known Problems Maternal Grandmother     No Known Problems Maternal Grandfather     No Known Problems Paternal Grandmother     No Known Problems Paternal Grandfather     Breast cancer Sister     Prostate cancer Brother        Social History:  reports that she has never smoked. She has never used smokeless tobacco. She reports previous alcohol use. She reports that she does not use drugs.    Review of Systemas  Constitutional: No change in weight, appetie, fatigue, fever, or night sweats  Eyes: No changes in vision  Ears, Nose, Mouth, Throat, and Face: No hearing problems, ear pain, rummy nose, or sore throat  Respiratory: No shortness of breath or cough  Cardiovascular: No chest pain, palpitations or dizziness  Gastrointestinal: No abdominal pain, hematochezia,  "melena  Genitourinary: No dysuria, hematuria, nocturia, menstrual problems, post menopausal  Integumentary/Breast: No rashes or itching  Hematologic/Lymphatic: No anemia or bleeding abnormalities  Musculoskeletal: No joint or muscle abnormalities  Neurological: No sensory or motor problems, no headaches  Behavioral/Psych: No depression, anxiety, cognitive problems, or stress  Endocrine: No diabetes or thyroid problems  Allergic/Immunologic: See allergy above    Physical Exam      Vitals:   Vitals:    01/11/22 1034   BP: (!) 196/94   Pulse: 74   Resp: 18   Temp: 97.7 °F (36.5 °C)   TempSrc: Oral   SpO2: (!) 92%   Weight: 72.1 kg (159 lb)   Height: 5' 3" (1.6 m)     BMI: Body mass index is 28.17 kg/m².  BSA Body surface area is 1.79 meters squared.  ECOG Performance Status:   ECOG SCORE        ECOG 1    GENERAL APPEARANCE: Well developed, well nourished, in no acute distress.  SKIN: Inspection of the skin reveals no rashes, ulcerations or petechiae.  HEENT: The sclerae were anicteric and conjunctivae were pink and moist. Extraocular movements were intact and pupils were equal, round, and reactive to light with normal accommodation. External inspection of the ears and nose showed no scars, lesions, or masses. Lips, teeth, and gums showed normal mucosa. The oral mucosa, hard and soft palate, tongue and posterior pharynx were normal.  NECK: Supple and symmetric. There was no thyroid enlargement, and no tenderness, or masses were felt.  CRESPIRATORY: Normal AP diameter and normal contour without any kyphoscoliosis. LUNGS: Auscultation of the lungs revealed normal breath sounds without any other adventitious sounds or rubs.  CARDIOVASCULAR: There was a regular rate and rhythm without any murmurs, gallops, rubs. The carotid pulses were normal and 2+ bilaterally without bruits. Peripheral pulses were 2+ and symmetric.  GASTROINTESTINAL: Soft and nontender with normal bowel sounds. The liver span was approximately 5-6 cm in " the right midclavicular line by percussion. The liver edge was nontender. The spleen was not palpable. There were no inguinal or umbilical hernias noted. No ascites was noted.  LYMPH NODES: No lymphadenopathy was appreciated in the neck, axillae or groin.  MUSCULOSKELETAL: Gait was normal. There was no tenderness or effusions noted. Muscle strength and tone were normal. EXTREMITIES: No cyanosis, clubbing or edema.  NEUROLOGIC: Alert and oriented x 3. Normal affect. Gait was normal. Normal deep tendon reflexes with no pathological reflexes. Sensation to touch was normal.  PSYCHIATRIC: good mood, orientated to place, time and person     Labs and Imagings     Lab Results   Component Value Date    WBC 3.2 (L) 01/11/2022    HGB 11.2 (L) 01/11/2022    HCT 35.3 (L) 01/11/2022    MCV 90.5 01/11/2022    LABPLAT 188 01/11/2022       CMP  Sodium   Date Value Ref Range Status   01/11/2022 143 135 - 145 mmol/L Final   10/14/2019 133 (L) 136 - 145 mmol/L Final     Potassium   Date Value Ref Range Status   01/11/2022 4.0 3.6 - 5.2 mmol/L Final   10/14/2019 4.4 3.5 - 5.1 mmol/L Final     Chloride   Date Value Ref Range Status   01/11/2022 103 100 - 108 mmol/L Final   10/14/2019 97 (L) 98 - 107 mmol/L Final     CO2   Date Value Ref Range Status   01/11/2022 33 (H) 21 - 32 mmol/L Final   10/14/2019 28 22 - 29 mmol/L Final     Glucose   Date Value Ref Range Status   01/11/2022 196 (H) 70 - 110 mg/dL Final     BUN   Date Value Ref Range Status   01/11/2022 19 (H) 7 - 18 mg/dL Final   10/14/2019 15.8 8 - 23 mg/dL Final     Creatinine   Date Value Ref Range Status   01/11/2022 1.07 (H) 0.55 - 1.02 mg/dL Final   10/14/2019 0.74 0.50 - 0.90 mg/dL Final     Calcium   Date Value Ref Range Status   01/11/2022 8.8 8.8 - 10.5 mg/dL Final   10/14/2019 9.9 8.6 - 10.2 mg/dL Final     Total Protein   Date Value Ref Range Status   01/11/2022 6.6 6.4 - 8.2 g/dL Final     Albumin   Date Value Ref Range Status   01/11/2022 3.4 3.4 - 5.0 g/dL Final    10/14/2019 4.3 3.5 - 5.2 g/dL Final     Total Bilirubin   Date Value Ref Range Status   2022 0.3 0.0 - 1.0 mg/dL Final     Alkaline Phosphatase   Date Value Ref Range Status   2022 51 46 - 116 U/L Final     AST   Date Value Ref Range Status   2022 14 (L) 15 - 37 U/L Final   10/14/2019 11 0 - 32 U/L Final     ALT   Date Value Ref Range Status   2022 17 12 - 78 U/L Final     Anion Gap   Date Value Ref Range Status   2022 7.0 3.0 - 11.0 mmol/L Final   10/14/2019 8.0 8.0 - 17.0 mmol/L Final     Comment:     NOTE  Testing performed at:  [facility], [address] [place] [address]  [address] CLIA #:[id number]         All cell laboratory reviewed which shows white cell 1.7 and sodium 126  Imaging: CT C/A/P 21:  CT Abdomen Pelvis  Without Contrast                                RADIOLOGY REPORT        PT NAME: SANDOVAL GUZMÁN      [facility] IMAG[facility]     : 1932 F 89             [address] [facility] [facility]    ACCT: [id number]                                              [facility] Rec #: [id number]                                        [id number]    [facility]             Procedure: CT ABD   PELVIS WO CONTRAST    REQUISITION #: [id number]      REPORT #: [id number]           DATE OF EXAM: 21    TIME OF EXAM: 0845       CT ABD   PELVIS WO CONTRAST    CMS MANDATED QUALITY DATA CT RADIATION 436    *All CT scans at this facility uses dose modulation and/or weight-based   dosing when appropriate to reduce radiation dose to as low as reasonably   achievable.            CLINICAL HISTORY:    History of bladder cancer        TECHNIQUE:    Acquisition: Contiguous scan slices were obtained from the top of the   hemidiaphragm through the pelvis.    Reconstructions: Axial, coronal and sagittal reconstructions.         Contrast:    IV : None given.    Oral :  None administered.    Phases: Noncontrasted imaging.    Limitations: Evaluation of  the abdominal viscera and gastrointestinal tract    is limited by the lack of contrast.    Estimated radiation dose: 14.86 mSv.        COMPARISON:    No prior relevant studies are available at this time.        FINDINGS:    Lower chest: Normal.        Abdomen:    Liver:  Normal.        Spleen: Normal.        Gallbladder and biliary tree: Normal.        Pancreas: Normal.        Adrenal glands: Normal.        Urinary tract:    Kidneys: Normal.    Pelvocalyceal systems: Normal. No hydronephrosis.    Ureters: Normal. No hydroureter        Retroperitoneum: Normal.        Mesentery and gastrointestinal tract: Normal.        Pelvis:    Urinary bladder: Normal.        Inguinal regions: Normal.        Vasculature:    1. Arterial calcification without aneurysm is present.        Osseous structures:    1. Degenerative changes noted throughout the spine with levoscoliosis.        Abdominal wall: Normal.        Additional findings: None seen.        IMPRESSION:        1.  No acute intra-abdominal pathology.            This document was created using a voice recognition transcribing system.   Incorrect words or phrases may have been missed during proof reading. Please   interpret accordingly or contact the radiologist for clarification if   necessary.                DICTATING PHYSICIAN:  ELLIE RAMAN MD                   Date Dictated: 12/28/21 0943        Signed By:  ELLIE RAMAN MD <Electronically signed by ELLIE RAMAN MD in OV>    Date Signed:  12/28/21 0959     CC: DELMI ANTONIO MD ; DELMI ANTONIO MD      ADMITTING PHYSICIAN:                                                                                                    ORDERING PHY: DELMI ANTONIO MD                                                                                                                                                      ATTENDING PHY: DELMI ANTONIO MD    Patient Status:  REG CLI    Admit Service Date: 12/28/21       CT Chest  Without Contrast                                RADIOLOGY REPORT        PT NAME: SANDOVAL GUZMÁN      [facility] IMAG[facility]     : 1932 F 89             [address]    ACCT: [id number]                                              [place], [place]    [facility] Rec #: [id number]                                        [id number]    [facility]             Procedure: CHEST THORAX WO CONT    REQUISITION #: [id number]      REPORT #: [id number]           DATE OF EXAM: 21    TIME OF EXAM: 0900       CHEST THORAX WO CONT    CMS MANDATED QUALITY DATA CT RADIATION 436    *All CT scans at this facility uses dose modulation and/or weight-based   dosing when appropriate to reduce radiation dose to as low as reasonably   achievable.        Clinical history:    Bladder cancer        Technique:    Acquisition: Contiguous scan slices were obtained from the apex of the lungs   through the diaphragms.    Reconstructions: Axial, coronal and sagittal. reconstructions of the data   set were obtained.    Contrast:    IV: No contrast was administered.    Other: None administered.    Phases: Noncontrasted images.    Limitations: No technical limitations noted.    Estimated radiation dose: 14.86 mSv.        Comparison:    2021        Findings:    Lungs, pleura and large airways:    1. Several small nodules are present in the lungs most notably in the right    middle lobe region. These appear to be stable for size and configuration.   Please continue to be approximately 5-6 mm in size. These appear to be   stable when compared to the previous exam. No new lung nodules are   demonstrated.        Heart:    1 coronary artery calcifications are noted.        Systemic vasculature: Normal.        Pulmonary vasculature: Normal.        Mediastinal and hilar structures: Normal.        Chest wall, axilla and lower neck: Normal.        Upper abdomen: Please see the CT of the abdomen report         Osseous structures:    1. Degenerative changes are noted throughout the spine.        Additional findings: None seen.        Impression        1.  Multiple small pulmonary nodules remain present bilaterally. These   appear to be stable for size and number.            This document was created using a voice recognition transcribing system.   Incorrect words or phrases may have been missed during proof reading. Please   interpret accordingly or contact the radiologist for clarification if   necessary.        DICTATING PHYSICIAN:  ELLIE RAMAN MD                   Date Dictated: 12/28/21 0934        Signed By:  ELLIE RAMAN MD <Electronically signed by ELLIE RAMAN MD in OV>    Date Signed:  12/28/21 0939     CC: DELMI ANTONIO MD ; DELMI ANTONIO MD      ADMITTING PHYSICIAN:                                                                                                    ORDERING PHY: DELMI ANTONIO MD                                                                                                                                                      ATTENDING PHY: DELMI ANTONIO MD    Patient Status:  REG CLI    Admit Service Date: 12/28/21                   Assessment:     Principle Problem: Malignant neoplasm of urinary bladder, unspecified site [C67.9]   Co-morbidity/Active Problems:   Patient Active Problem List   Diagnosis    Malignant neoplasm of urinary bladder    Essential hypertension    Type 2 diabetes mellitus without complication, without long-term current use of insulin    Encounter for antineoplastic chemotherapy    Chemotherapy-induced nausea    Chemotherapy-induced neutropenia    Absolute anemia    Encounter for immunotherapy    Malignant neoplasm metastatic to both lungs    Elevated blood sugar        Sherice Haji is a 89 y.o. female with PMH of The primary encounter diagnosis was Malignant neoplasm of urinary bladder, unspecified site. Diagnoses of Malignant neoplasm  metastatic to both lungs and Encounter for immunotherapy were also pertinent to this visit., with Malignant neoplasm of urinary bladder, unspecified site [C67.9]   Sherice Haji is a 87 y.o. 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 [C67.3]   87-year-old female otherwise healthy except hypertension diabetes present with bladder mass biopsy-proven muscle invasive bladder cancer( stage II) and     biopsy-proven muscle invasive bladder cancer( stage II) recurrence  Normal renal function  Neutropenia  Status post chemo radiation therapy December 2019  recurrent muscle invasive bladder cance  3/17/21Pulm nodules noted on CT Chest     Plan:  Status post   Overall Plan:  Concurrent chemoradiation therapy, followed by optional cystectomy and full dose chemo C/G; patient does not want chemotherapy so the other option is immunotherapy  12/2/21:  Patient continues to tolerate Keytruda without any complaint.  her elevated blood sugars are being followed by her PCP   ==CT chest  scan reviewed will request comparison to CT Chest done 3/21 at [facility]  ==RTC in 3 weeks with labs   12/22/21: patient continues to tolerate tx well but some fatigue noted, did not get ct scans done, will help schedule scans prior to next appt. Labs reviewed and pt cleared for tx.  Pt recently moved into [facility] last month.   1/11/22:  Recent CT scans show stable pulmonary nodules, currently no evidence of disease in abdomen and pelvis.  Patient continues to tolerate immunotherapy very well, labs reviewed and patient will continue with current therapy as planned.  She denies any diarrhea, cough, rash or itching

## 2022-01-31 DIAGNOSIS — C67.9 MALIGNANT NEOPLASM OF URINARY BLADDER, UNSPECIFIED SITE: Primary | ICD-10-CM

## 2022-02-01 ENCOUNTER — OFFICE VISIT (OUTPATIENT)
Dept: HEMATOLOGY/ONCOLOGY | Facility: CLINIC | Age: 87
End: 2022-02-01
Payer: COMMERCIAL

## 2022-02-01 VITALS
OXYGEN SATURATION: 96 % | WEIGHT: 159.69 LBS | TEMPERATURE: 97 F | BODY MASS INDEX: 28.29 KG/M2 | SYSTOLIC BLOOD PRESSURE: 163 MMHG | RESPIRATION RATE: 18 BRPM | HEART RATE: 85 BPM | DIASTOLIC BLOOD PRESSURE: 83 MMHG | HEIGHT: 63 IN

## 2022-02-01 DIAGNOSIS — C67.9 MALIGNANT NEOPLASM OF URINARY BLADDER, UNSPECIFIED SITE: Primary | ICD-10-CM

## 2022-02-01 DIAGNOSIS — C78.02 MALIGNANT NEOPLASM METASTATIC TO BOTH LUNGS: ICD-10-CM

## 2022-02-01 DIAGNOSIS — C78.01 MALIGNANT NEOPLASM METASTATIC TO BOTH LUNGS: ICD-10-CM

## 2022-02-01 DIAGNOSIS — Z29.89 ENCOUNTER FOR IMMUNOTHERAPY: ICD-10-CM

## 2022-02-01 LAB
ALBUMIN SERPL BCP-MCNC: 3.6 G/DL (ref 3.4–5)
ALBUMIN/GLOBULIN RATIO: 1.09 RATIO (ref 1.1–1.8)
ALP SERPL-CCNC: 56 U/L (ref 46–116)
ALT SERPL W P-5'-P-CCNC: 15 U/L (ref 12–78)
ANION GAP SERPL CALC-SCNC: 7 MMOL/L (ref 3–11)
AST SERPL-CCNC: 13 U/L (ref 15–37)
BILIRUB SERPL-MCNC: 0.4 MG/DL (ref 0–1)
BUN SERPL-MCNC: 19 MG/DL (ref 7–18)
BUN/CREAT SERPL: 17.43 RATIO (ref 7–18)
CALCIUM SERPL-MCNC: 9.5 MG/DL (ref 8.8–10.5)
CELLS COUNTED: 100
CHLORIDE SERPL-SCNC: 101 MMOL/L (ref 100–108)
CO2 SERPL-SCNC: 32 MMOL/L (ref 21–32)
CREAT SERPL-MCNC: 1.09 MG/DL (ref 0.55–1.02)
EOSINOPHIL NFR BLD: 4 % (ref 1–3)
ERYTHROCYTE [DISTWIDTH] IN BLOOD BY AUTOMATED COUNT: 13.9 % (ref 12.5–18)
GFR ESTIMATION: 57
GLOBULIN: 3.3 G/DL (ref 2.3–3.5)
GLUCOSE SERPL-MCNC: 208 MG/DL (ref 70–110)
HCT VFR BLD AUTO: 37.2 % (ref 37–47)
HGB BLD-MCNC: 11.6 G/DL (ref 12–16)
LYMPHOCYTES NFR BLD: 21 % (ref 25–40)
MCH RBC QN AUTO: 28.2 PG (ref 27–31.2)
MCHC RBC AUTO-ENTMCNC: 31.2 G/DL (ref 31.8–35.4)
MCV RBC AUTO: 90.5 FL (ref 80–97)
MONOCYTES NFR BLD: 10 % (ref 1–15)
NEUTROPHILS # BLD AUTO: 2.5 10*3/UL (ref 1.8–7.7)
NEUTROPHILS NFR BLD: 65 % (ref 37–80)
NUCLEATED RED BLOOD CELLS: 0 %
PLATELETS: 188 10*3/UL (ref 142–424)
POTASSIUM SERPL-SCNC: 3.9 MMOL/L (ref 3.6–5.2)
PROT SERPL-MCNC: 6.9 G/DL (ref 6.4–8.2)
RBC # BLD AUTO: 4.11 10*6/UL (ref 4.2–5.4)
RBC MORPH BLD: ABNORMAL
SMALL PLATELETS BLD QL SMEAR: ADEQUATE
SODIUM BLD-SCNC: 140 MMOL/L (ref 135–145)
TSH SERPL DL<=0.005 MIU/L-ACNC: 0.53 UIU/ML (ref 0.36–3.74)
WBC # BLD: 3.8 10*3/UL (ref 4.6–10.2)

## 2022-02-01 PROCEDURE — 99214 PR OFFICE/OUTPT VISIT, EST, LEVL IV, 30-39 MIN: ICD-10-PCS | Mod: S$GLB,,, | Performed by: NURSE PRACTITIONER

## 2022-02-01 PROCEDURE — 1159F MED LIST DOCD IN RCRD: CPT | Mod: CPTII,S$GLB,, | Performed by: NURSE PRACTITIONER

## 2022-02-01 PROCEDURE — 1126F PR PAIN SEVERITY QUANTIFIED, NO PAIN PRESENT: ICD-10-PCS | Mod: CPTII,S$GLB,, | Performed by: NURSE PRACTITIONER

## 2022-02-01 PROCEDURE — 1101F PT FALLS ASSESS-DOCD LE1/YR: CPT | Mod: CPTII,S$GLB,, | Performed by: NURSE PRACTITIONER

## 2022-02-01 PROCEDURE — 1160F PR REVIEW ALL MEDS BY PRESCRIBER/CLIN PHARMACIST DOCUMENTED: ICD-10-PCS | Mod: CPTII,S$GLB,, | Performed by: NURSE PRACTITIONER

## 2022-02-01 PROCEDURE — 3288F FALL RISK ASSESSMENT DOCD: CPT | Mod: CPTII,S$GLB,, | Performed by: NURSE PRACTITIONER

## 2022-02-01 PROCEDURE — 1126F AMNT PAIN NOTED NONE PRSNT: CPT | Mod: CPTII,S$GLB,, | Performed by: NURSE PRACTITIONER

## 2022-02-01 PROCEDURE — 1159F PR MEDICATION LIST DOCUMENTED IN MEDICAL RECORD: ICD-10-PCS | Mod: CPTII,S$GLB,, | Performed by: NURSE PRACTITIONER

## 2022-02-01 PROCEDURE — 1101F PR PT FALLS ASSESS DOC 0-1 FALLS W/OUT INJ PAST YR: ICD-10-PCS | Mod: CPTII,S$GLB,, | Performed by: NURSE PRACTITIONER

## 2022-02-01 PROCEDURE — 1160F RVW MEDS BY RX/DR IN RCRD: CPT | Mod: CPTII,S$GLB,, | Performed by: NURSE PRACTITIONER

## 2022-02-01 PROCEDURE — 3288F PR FALLS RISK ASSESSMENT DOCUMENTED: ICD-10-PCS | Mod: CPTII,S$GLB,, | Performed by: NURSE PRACTITIONER

## 2022-02-01 PROCEDURE — 99214 OFFICE O/P EST MOD 30 MIN: CPT | Mod: S$GLB,,, | Performed by: NURSE PRACTITIONER

## 2022-02-01 RX ORDER — LOSARTAN POTASSIUM 100 MG/1
1 TABLET ORAL DAILY
COMMUNITY
Start: 2021-12-25 | End: 2022-04-05

## 2022-02-01 RX ORDER — SODIUM CHLORIDE 0.9 % (FLUSH) 0.9 %
10 SYRINGE (ML) INJECTION
Status: CANCELLED | OUTPATIENT
Start: 2022-02-03

## 2022-02-01 RX ORDER — HEPARIN 100 UNIT/ML
500 SYRINGE INTRAVENOUS
Status: CANCELLED | OUTPATIENT
Start: 2022-02-03

## 2022-02-01 NOTE — PROGRESS NOTES
Hematology Oncology Progress Note    Hematology Oncology  [facility]     PATIENT: Sherice Haji  : 1932 89 y.o. female  MRN [id number]     PCP: Sejal Castillo MD  Consult Requested By:  No att. providers found    Date of Service: 2022    Subjective:   Interim History:  Malignant neoplasm of urinary bladder, unspecified site    The patient doing well      Oncology History:    Sherice Haji is here for to followup for bladder cancer.  Current therapy patient doing well without new complaints she specifically denies hematuria or weight loss she has a good appetite.  This is a 87 y.o. female patient with a history of The primary encounter diagnosis was Malignant 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. Diagnoses of Essential hypertension and Type 2 diabetes mellitus without complication, without long-term current use of insulin were also pertinent to this visit., who is referred here for evaluation treatment of bladder cancer.  The patient has been feeling weak and for T for about 2 months.  Urinalysis shows hematuria or and she was referred to Urology.  CT scan with IV contrast demonstrates a bladder mass. Biopsy shows muscle invasive bladder cancer.     The patient lost about 10 lb over the past 2 months.  She denies any abdominal pain chest pain short of breath.     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 Constipation. Prior surgeries. Densely calcified coronary arteries.     ==11/2020 Keytruda restarted  ==5/2021 C scope  A scar as well as necrotic tissue identified in the left superior wall of the bladder consistent with the area of previous resection.  No evidence of recurrent disease.  Ureteral orifices of normal shape,  location and sizes effluxing clear urine.       Oncology History   Malignant neoplasm of urinary bladder   9/25/2019 Initial Diagnosis    Malignant 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     10/21/2019 Cancer Staged    Staging form: Urinary Bladder, AJCC 8th Edition  - Clinical: Stage II (cT2, cN0, cM0)     10/31/2019 - 12/11/2019 Chemotherapy    Treatment Summary   Plan Name: cisplatin taxol weekly  Treatment Goal: Curative  Status: Inactive  Start Date: 10/31/2019  End Date: 12/5/2019  Provider: Hoda Josue NP  Chemotherapy: CISplatin (PLATINOL) 20 mg/m2 = 34 mg in sodium chloride 0.9% 500 mL chemo infusion, 20 mg/m2 = 34 mg (100 % of original dose 20 mg/m2), Intravenous, Clinic/HOD 1 time, 6 of 6 cycles  Dose modification: 20 mg/m2 (original dose 20 mg/m2, Cycle 1)  PACLitaxel (TAXOL) 50 mg/m2 = 84 mg in sodium chloride 0.9% 500 mL chemo infusion, 50 mg/m2 = 84 mg (100 % of original dose 50 mg/m2), Intravenous, Clinic/HOD 1 time, 6 of 6 cycles  Dose modification: 50 mg/m2 (original dose 50 mg/m2, Cycle 1)     6/24/2020 - 6/24/2020 Chemotherapy    Treatment Summary   Plan Name: OP BLADDER GEMCITABINE CISPLATIN (SPLIT DOSE CISPLATIN) Q3W  Treatment Goal: Curative  Status: Inactive  Start Date:   End Date:   Provider: Misael Gordon MD  Chemotherapy: CISplatin (PLATINOL) 35 mg/m2 = 62 mg in sodium chloride 0.9% 500 mL chemo infusion, 35 mg/m2, Intravenous, Clinic/HOD 1 time, 0 of 4 cycles  gemcitabine (GEMZAR) 1,780 mg in sodium chloride 0.9% 250 mL chemo infusion, 1,000 mg/m2, Intravenous, Clinic/HOD 1 time, 0 of 4 cycles     11/11/2020 -   "Chemotherapy    Treatment Summary   Plan Name: OP PEMBROLIZUMAB   Treatment Goal: Control  Status: Active  Start Date: 11/11/2020  End Date: 4/6/2022 (Planned)  Provider: Misael Gordon MD  Chemotherapy: pembrolizumab (KEYTRUDA) 200 mg in sodium chloride 0.9% 100 mL chemo infusion, 200 mg, Intravenous, Clinic/HOD 1 time, 19 of 23 cycles  Dose modification: 400 mg (original dose 200 mg, Cycle 15), 200 mg (original dose 200 mg, Cycle 15), 200 mg (original dose 200 mg, Cycle 16), 200 mg (original dose 200 mg, Cycle 17)         Oncologic History:      Oncologic History     Oncologic Treatment     Pathology      Past Medical History:   Past Medical History:   Diagnosis Date    Absolute anemia 12/5/2019    Allergy     Anxiety     Arthritis     Bladder cancer     Cancer     Cataract     Removed in May 2019    Depression     Diabetes mellitus     Hypertension     Lung cancer     Malignant 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 9/25/2019    Type 2 diabetes mellitus without complication, without long-term current use of insulin 9/25/2019       Past Surgical HIstory:   Past Surgical History:   Procedure Laterality Date    BLADDER SURGERY      surgery on July 22 2020 to see if patients cancer was still present    BLADDER TUMOR EXCISION  09/2019    CATARACT EXTRACTION, BILATERAL  05/2019       Allergies:  Review of patient's allergies indicates:   Allergen Reactions    Aspirin        Medications:  Current Outpatient Medications   Medication Sig Dispense Refill    BD ALCOHOL SWABS PadM       clopidogreL (PLAVIX) 75 mg tablet       diphenoxylate-atropine 2.5-0.025 mg (LOMOTIL) 2.5-0.025 mg per tablet       DROPLET PEN NEEDLE 32 gauge x 5/32" Ndle       glimepiride (AMARYL) 2 MG tablet       HYDROcodone-acetaminophen (NORCO) 5-325 mg per tablet       hyoscyamine (LEVSIN/SL) 0.125 mg Subl       indapamide (LOZOL) 2.5 MG Tab       LANTUS SOLOSTAR U-100 INSULIN glargine 100 units/mL (3mL) SubQ pen    "    levothyroxine (SYNTHROID) 75 MCG tablet       loratadine (CLARITIN) 10 mg tablet       losartan (COZAAR) 100 MG tablet Take 1 tablet by mouth once daily.      meclizine (ANTIVERT) 12.5 mg tablet       meloxicam (MOBIC) 7.5 MG tablet       metFORMIN (GLUCOPHAGE-XR) 500 MG 24 hr tablet       metoprolol tartrate (LOPRESSOR) 50 MG tablet       simvastatin (ZOCOR) 20 MG tablet       traZODone (DESYREL) 50 MG tablet        No current facility-administered medications for this visit.       Family History:   Family History   Problem Relation Age of Onset    No Known Problems Mother     No Known Problems Father     Breast cancer Sister     Prostate cancer Brother     No Known Problems Maternal Aunt     No Known Problems Maternal Uncle     No Known Problems Paternal Aunt     No Known Problems Paternal Uncle     No Known Problems Maternal Grandmother     No Known Problems Maternal Grandfather     No Known Problems Paternal Grandmother     No Known Problems Paternal Grandfather     Breast cancer Sister     Prostate cancer Brother        Social History:  reports that she has never smoked. She has never used smokeless tobacco. She reports previous alcohol use. She reports that she does not use drugs.    Review of Systemas  Constitutional: No change in weight, appetie, fatigue, fever, or night sweats  Eyes: No changes in vision  Ears, Nose, Mouth, Throat, and Face: No hearing problems, ear pain, rummy nose, or sore throat  Respiratory: No shortness of breath or cough  Cardiovascular: No chest pain, palpitations or dizziness  Gastrointestinal: No abdominal pain, hematochezia, melena  Genitourinary: No dysuria, hematuria, nocturia, menstrual problems, post menopausal  Integumentary/Breast: No rashes or itching  Hematologic/Lymphatic: No anemia or bleeding abnormalities  Musculoskeletal: No joint or muscle abnormalities  Neurological: No sensory or motor problems, no headaches  Behavioral/Psych: No  "depression, anxiety, cognitive problems, or stress  Endocrine: No diabetes or thyroid problems  Allergic/Immunologic: See allergy above    Physical Exam      Vitals:   Vitals:    02/01/22 0924   BP: (!) 163/83   Pulse: 85   Resp: 18   Temp: 97.1 °F (36.2 °C)   TempSrc: Temporal   SpO2: 96%   Weight: 72.4 kg (159 lb 11.2 oz)   Height: 5' 3" (1.6 m)     BMI: Body mass index is 28.29 kg/m².  BSA Body surface area is 1.79 meters squared.  ECOG Performance Status:   ECOG SCORE        ECOG 1    GENERAL APPEARANCE: Well developed, well nourished, in no acute distress.  SKIN: Inspection of the skin reveals no rashes, ulcerations or petechiae.  HEENT: The sclerae were anicteric and conjunctivae were pink and moist. Extraocular movements were intact and pupils were equal, round, and reactive to light with normal accommodation. External inspection of the ears and nose showed no scars, lesions, or masses. Lips, teeth, and gums showed normal mucosa. The oral mucosa, hard and soft palate, tongue and posterior pharynx were normal.  NECK: Supple and symmetric. There was no thyroid enlargement, and no tenderness, or masses were felt.  CRESPIRATORY: Normal AP diameter and normal contour without any kyphoscoliosis. LUNGS: Auscultation of the lungs revealed normal breath sounds without any other adventitious sounds or rubs.  CARDIOVASCULAR: There was a regular rate and rhythm without any murmurs, gallops, rubs. The carotid pulses were normal and 2+ bilaterally without bruits. Peripheral pulses were 2+ and symmetric.  GASTROINTESTINAL: Soft and nontender with normal bowel sounds. The liver span was approximately 5-6 cm in the right midclavicular line by percussion. The liver edge was nontender. The spleen was not palpable. There were no inguinal or umbilical hernias noted. No ascites was noted.  LYMPH NODES: No lymphadenopathy was appreciated in the neck, axillae or groin.  MUSCULOSKELETAL: Gait was normal. There was no tenderness or " effusions noted. Muscle strength and tone were normal. EXTREMITIES: No cyanosis, clubbing or edema.  NEUROLOGIC: Alert and oriented x 3. Normal affect. Gait was normal. Normal deep tendon reflexes with no pathological reflexes. Sensation to touch was normal.  PSYCHIATRIC: good mood, orientated to place, time and person     Labs and Imagings     Lab Results   Component Value Date    WBC 3.8 (L) 02/01/2022    HGB 11.6 (L) 02/01/2022    HCT 37.2 02/01/2022    MCV 90.5 02/01/2022    LABPLAT 188 02/01/2022       CMP  Sodium   Date Value Ref Range Status   02/01/2022 140 135 - 145 mmol/L Final   10/14/2019 133 (L) 136 - 145 mmol/L Final     Potassium   Date Value Ref Range Status   02/01/2022 3.9 3.6 - 5.2 mmol/L Final   10/14/2019 4.4 3.5 - 5.1 mmol/L Final     Chloride   Date Value Ref Range Status   02/01/2022 101 100 - 108 mmol/L Final   10/14/2019 97 (L) 98 - 107 mmol/L Final     CO2   Date Value Ref Range Status   02/01/2022 32 21 - 32 mmol/L Final   10/14/2019 28 22 - 29 mmol/L Final     Glucose   Date Value Ref Range Status   02/01/2022 208 (H) 70 - 110 mg/dL Final     BUN   Date Value Ref Range Status   02/01/2022 19 (H) 7 - 18 mg/dL Final   10/14/2019 15.8 8 - 23 mg/dL Final     Creatinine   Date Value Ref Range Status   02/01/2022 1.09 (H) 0.55 - 1.02 mg/dL Final   10/14/2019 0.74 0.50 - 0.90 mg/dL Final     Calcium   Date Value Ref Range Status   02/01/2022 9.5 8.8 - 10.5 mg/dL Final   10/14/2019 9.9 8.6 - 10.2 mg/dL Final     Total Protein   Date Value Ref Range Status   02/01/2022 6.9 6.4 - 8.2 g/dL Final     Albumin   Date Value Ref Range Status   02/01/2022 3.6 3.4 - 5.0 g/dL Final   10/14/2019 4.3 3.5 - 5.2 g/dL Final     Total Bilirubin   Date Value Ref Range Status   02/01/2022 0.4 0.0 - 1.0 mg/dL Final     Alkaline Phosphatase   Date Value Ref Range Status   02/01/2022 56 46 - 116 U/L Final     AST   Date Value Ref Range Status   02/01/2022 13 (L) 15 - 37 U/L Final   10/14/2019 11 0 - 32 U/L Final      ALT   Date Value Ref Range Status   2022 15 12 - 78 U/L Final     Anion Gap   Date Value Ref Range Status   2022 7.0 3.0 - 11.0 mmol/L Final   10/14/2019 8.0 8.0 - 17.0 mmol/L Final     Comment:     NOTE  Testing performed at:  [facility], [address] [place]  [address] CLIA #:[id number]         All cell laboratory reviewed which shows white cell 1.7 and sodium 126  Imaging: CT C/A/P 21:  CT Abdomen Pelvis  Without Contrast                                RADIOLOGY REPORT        PT NAME: SANDOVAL GUZMÁN      [facility]     : 1932 F 89             [address]    ACCT: [id number]                                              [place], [place]    [facility] Rec #: [id number]                                        [id number]    [facility]             Procedure: CT ABD   PELVIS WO CONTRAST    REQUISITION #: [id number]      REPORT #: [id number]           DATE OF EXAM: 21    TIME OF EXAM: 0845       CT ABD   PELVIS WO CONTRAST    CMS MANDATED QUALITY DATA CT RADIATION 436    *All CT scans at this facility uses dose modulation and/or weight-based   dosing when appropriate to reduce radiation dose to as low as reasonably   achievable.            CLINICAL HISTORY:    History of bladder cancer        TECHNIQUE:    Acquisition: Contiguous scan slices were obtained from the top of the   hemidiaphragm through the pelvis.    Reconstructions: Axial, coronal and sagittal reconstructions.         Contrast:    IV : None given.    Oral :  None administered.    Phases: Noncontrasted imaging.    Limitations: Evaluation of the abdominal viscera and gastrointestinal tract    is limited by the lack of contrast.    Estimated radiation dose: 14.86 mSv.        COMPARISON:    No prior relevant studies are available at this time.        FINDINGS:    Lower chest: Normal.        Abdomen:    Liver:  Normal.        Spleen: Normal.        Gallbladder and  biliary tree: Normal.        Pancreas: Normal.        Adrenal glands: Normal.        Urinary tract:    Kidneys: Normal.    Pelvocalyceal systems: Normal. No hydronephrosis.    Ureters: Normal. No hydroureter        Retroperitoneum: Normal.        Mesentery and gastrointestinal tract: Normal.        Pelvis:    Urinary bladder: Normal.        Inguinal regions: Normal.        Vasculature:    1. Arterial calcification without aneurysm is present.        Osseous structures:    1. Degenerative changes noted throughout the spine with levoscoliosis.        Abdominal wall: Normal.        Additional findings: None seen.        IMPRESSION:        1.  No acute intra-abdominal pathology.            This document was created using a voice recognition transcribing system.   Incorrect words or phrases may have been missed during proof reading. Please   interpret accordingly or contact the radiologist for clarification if   necessary.                DICTATING PHYSICIAN:  ELLIE RAMAN MD                   Date Dictated: 21        Signed By:  ELLIE RAMAN MD <Electronically signed by ELLIE RAMAN MD in OV>    Date Signed:  21     CC: DELMI ANTONIO MD ; DELMI ANTONIO MD      ADMITTING PHYSICIAN:                                                                                                    ORDERING PHY: DELMI ANTONIO MD                                                                                                                                                      ATTENDING PHY: DELMI ANTONIO MD    Patient Status:  REG CLI    Admit Service Date: 21       CT Chest Without Contrast                                RADIOLOGY REPORT        PT NAME: SANDOVAL GUZMÁN      [facility]     : 1932 F 89             [address]    ACCT: [id number]                                              [facility] Rec #: [id number]                                         [id number]    [facility]             Procedure: CHEST THORAX WO CONT    REQUISITION #: [id number]      REPORT #: [id number]           DATE OF EXAM: 12/28/21    TIME OF EXAM: 0900       CHEST THORAX WO CONT    CMS MANDATED QUALITY DATA CT RADIATION 436    *All CT scans at this facility uses dose modulation and/or weight-based   dosing when appropriate to reduce radiation dose to as low as reasonably   achievable.        Clinical history:    Bladder cancer        Technique:    Acquisition: Contiguous scan slices were obtained from the apex of the lungs   through the diaphragms.    Reconstructions: Axial, coronal and sagittal. reconstructions of the data   set were obtained.    Contrast:    IV: No contrast was administered.    Other: None administered.    Phases: Noncontrasted images.    Limitations: No technical limitations noted.    Estimated radiation dose: 14.86 mSv.        Comparison:    September 16, 2021        Findings:    Lungs, pleura and large airways:    1. Several small nodules are present in the lungs most notably in the right    middle lobe region. These appear to be stable for size and configuration.   Please continue to be approximately 5-6 mm in size. These appear to be   stable when compared to the previous exam. No new lung nodules are   demonstrated.        Heart:    1 coronary artery calcifications are noted.        Systemic vasculature: Normal.        Pulmonary vasculature: Normal.        Mediastinal and hilar structures: Normal.        Chest wall, axilla and lower neck: Normal.        Upper abdomen: Please see the CT of the abdomen report        Osseous structures:    1. Degenerative changes are noted throughout the spine.        Additional findings: None seen.        Impression        1.  Multiple small pulmonary nodules remain present bilaterally. These   appear to be stable for size and number.            This document was created using a voice recognition  transcribing system.   Incorrect words or phrases may have been missed during proof reading. Please   interpret accordingly or contact the radiologist for clarification if   necessary.        DICTATING PHYSICIAN:  ELLIE RAMAN MD                   Date Dictated: 12/28/21 0934        Signed By:  ELLIE RAMAN MD <Electronically signed by ELLIE RAMAN MD in OV>    Date Signed:  12/28/21 0939     CC: DELMI ANTONIO MD ; DELMI ANTONIO MD      ADMITTING PHYSICIAN:                                                                                                    ORDERING PHY: DELMI ANTONIO MD                                                                                                                                                      ATTENDING PHY: DELMI ANTONIO MD    Patient Status:  REG CLI    Admit Service Date: 12/28/21                   Assessment:     Principle Problem: No primary diagnosis found.   Co-morbidity/Active Problems:   Patient Active Problem List   Diagnosis    Malignant neoplasm of urinary bladder    Essential hypertension    Type 2 diabetes mellitus without complication, without long-term current use of insulin    Encounter for antineoplastic chemotherapy    Chemotherapy-induced nausea    Chemotherapy-induced neutropenia    Absolute anemia    Encounter for immunotherapy    Malignant neoplasm metastatic to both lungs    Elevated blood sugar        Sherice Haji is a 89 y.o. female with PMH of There were no encounter diagnoses., with No primary diagnosis found.   Sherice Haji is a 87 y.o. 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 [C67.3]   87-year-old female otherwise healthy  except hypertension diabetes present with bladder mass biopsy-proven muscle invasive bladder cancer( stage II) and     biopsy-proven muscle invasive bladder cancer( stage II) recurrence  Normal renal function  Neutropenia  Status post chemo radiation therapy 2019  recurrent muscle invasive bladder cance  3/17/21Pulm nodules noted on CT Chest     Plan:  Status post   Overall Plan:  Concurrent chemoradiation therapy, followed by optional cystectomy and full dose chemo C/G; patient does not want chemotherapy so the other option is immunotherapy  21:  Patient continues to tolerate Keytruda without any complaint.  her elevated blood sugars are being followed by her PCP   ==CT chest  scan reviewed will request comparison to CT Chest done 3/21 at [facility]  ==RTC in 3 weeks with labs   21: patient continues to tolerate tx well but some fatigue noted, did not get ct scans done, will help schedule scans prior to next appt. Labs reviewed and pt cleared for tx.  Pt recently [facility] into [facility] last month.   22:  Recent CT scans show stable pulmonary nodules, currently no evidence of disease in abdomen and pelvis.  Patient continues to tolerate immunotherapy very well, labs reviewed and patient will continue with current therapy as planned.  She denies any diarrhea, cough, rash or itching                                                        Hematology Oncology Progress Note    Hematology Oncology  [facility]     PATIENT: Sherice Haji  : 1932 89 y.o. female  MRN [id number]     PCP: Sejal Castillo MD  Consult Requested By:  No att. providers found    Date of Service: 2022    Subjective:   Interim History:  Malignant neoplasm of urinary bladder, unspecified site    The patient doing well      Oncology History:    Sherice Haji is here for to followup for bladder cancer.  Current therapy patient doing well without new complaints she specifically denies  hematuria or weight loss she has a good appetite.  This is a 87 y.o. female patient with a history of The primary encounter diagnosis was Malignant 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. Diagnoses of Essential hypertension and Type 2 diabetes mellitus without complication, without long-term current use of insulin were also pertinent to this visit., who is referred here for evaluation treatment of bladder cancer.  The patient has been feeling weak and for T for about 2 months.  Urinalysis shows hematuria or and she was referred to Urology.  CT scan with IV contrast demonstrates a bladder mass. Biopsy shows muscle invasive bladder cancer.     The patient lost about 10 lb over the past 2 months.  She denies any abdominal pain chest pain short of breath.     ==12/2019 completed concurrent chemoradiation therapy as a bladder preservation  ==4/2020 Cyst NEO in bladder  ==4/2020 discussed a chemotherapy options such as cisplatin gemcitabine as a 20 % dose reduction; she declined chemotherapy  ==7/2020 recurrence muscle invasive bladder cancer on cystoscope  ==8/2020 we discussed options for her; for those chemotherapy with cisplatin gemcitabine versus immunotherapy with ago to induce local response and hopefully bladder preservation as a salvage; she prefer immunotherapy  == she was displaced by JANIE Aparicio  ==11/30/20  CT There is some wall thickening along the left anterior aspect of the bladder unchanged. Constipation. Prior surgeries. Densely calcified coronary arteries.     ==11/2020 Keytruda restarted  ==5/2021 C scope  A scar as well as necrotic tissue identified in the left superior wall of the bladder consistent with the area of previous resection.  No evidence of recurrent disease.  Ureteral orifices of normal shape,  location and sizes effluxing clear urine.       Oncology History   Malignant neoplasm of urinary bladder   9/25/2019 Initial Diagnosis    Malignant 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      10/21/2019 Cancer Staged    Staging form: Urinary Bladder, AJCC 8th Edition  - Clinical: Stage II (cT2, cN0, cM0)     10/31/2019 - 12/11/2019 Chemotherapy    Treatment Summary   Plan Name: cisplatin taxol weekly  Treatment Goal: Curative  Status: Inactive  Start Date: 10/31/2019  End Date: 12/5/2019  Provider: Hoda Josue NP  Chemotherapy: CISplatin (PLATINOL) 20 mg/m2 = 34 mg in sodium chloride 0.9% 500 mL chemo infusion, 20 mg/m2 = 34 mg (100 % of original dose 20 mg/m2), Intravenous, Clinic/HOD 1 time, 6 of 6 cycles  Dose modification: 20 mg/m2 (original dose 20 mg/m2, Cycle 1)  PACLitaxel (TAXOL) 50 mg/m2 = 84 mg in sodium chloride 0.9% 500 mL chemo infusion, 50 mg/m2 = 84 mg (100 % of original dose 50 mg/m2), Intravenous, Clinic/HOD 1 time, 6 of 6 cycles  Dose modification: 50 mg/m2 (original dose 50 mg/m2, Cycle 1)     6/24/2020 - 6/24/2020 Chemotherapy    Treatment Summary   Plan Name: OP BLADDER GEMCITABINE CISPLATIN (SPLIT DOSE CISPLATIN) Q3W  Treatment Goal: Curative  Status: Inactive  Start Date:   End Date:   Provider: Misael Gordon MD  Chemotherapy: CISplatin (PLATINOL) 35 mg/m2 = 62 mg in sodium chloride 0.9% 500 mL chemo infusion, 35 mg/m2, Intravenous, Clinic/HOD 1 time, 0 of 4 cycles  gemcitabine (GEMZAR) 1,780 mg in sodium chloride 0.9% 250 mL chemo infusion, 1,000 mg/m2, Intravenous, Clinic/HOD 1 time, 0 of 4 cycles     11/11/2020 -  Chemotherapy    Treatment Summary   Plan Name: OP PEMBROLIZUMAB   Treatment Goal: Control  Status: Active  Start Date: 11/11/2020  End Date: 4/6/2022 (Planned)  Provider: Misael Gordon MD  Chemotherapy: pembrolizumab (KEYTRUDA) 200 mg in sodium chloride 0.9% 100 mL chemo infusion, 200 mg, Intravenous, Clinic/HOD 1 time, 19 of 23 cycles  Dose modification: 400 mg (original dose 200 mg, Cycle 15), 200 mg (original dose 200 mg, Cycle 15), 200 mg (original dose 200 mg, Cycle 16), 200 mg (original dose 200 mg, Cycle 17)         Oncologic History:      Oncologic  "History     Oncologic Treatment     Pathology      Past Medical History:   Past Medical History:   Diagnosis Date    Absolute anemia 12/5/2019    Allergy     Anxiety     Arthritis     Bladder cancer     Cancer     Cataract     Removed in May 2019    Depression     Diabetes mellitus     Hypertension     Lung cancer     Malignant 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 9/25/2019    Type 2 diabetes mellitus without complication, without long-term current use of insulin 9/25/2019       Past Surgical HIstory:   Past Surgical History:   Procedure Laterality Date    BLADDER SURGERY      surgery on July 22 2020 to see if patients cancer was still present    BLADDER TUMOR EXCISION  09/2019    CATARACT EXTRACTION, BILATERAL  05/2019       Allergies:  Review of patient's allergies indicates:   Allergen Reactions    Aspirin        Medications:  Current Outpatient Medications   Medication Sig Dispense Refill    BD ALCOHOL SWABS PadM       clopidogreL (PLAVIX) 75 mg tablet       diphenoxylate-atropine 2.5-0.025 mg (LOMOTIL) 2.5-0.025 mg per tablet       DROPLET PEN NEEDLE 32 gauge x 5/32" Ndle       glimepiride (AMARYL) 2 MG tablet       HYDROcodone-acetaminophen (NORCO) 5-325 mg per tablet       hyoscyamine (LEVSIN/SL) 0.125 mg Subl       indapamide (LOZOL) 2.5 MG Tab       LANTUS SOLOSTAR U-100 INSULIN glargine 100 units/mL (3mL) SubQ pen       levothyroxine (SYNTHROID) 75 MCG tablet       loratadine (CLARITIN) 10 mg tablet       losartan (COZAAR) 100 MG tablet Take 1 tablet by mouth once daily.      meclizine (ANTIVERT) 12.5 mg tablet       meloxicam (MOBIC) 7.5 MG tablet       metFORMIN (GLUCOPHAGE-XR) 500 MG 24 hr tablet       metoprolol tartrate (LOPRESSOR) 50 MG tablet       simvastatin (ZOCOR) 20 MG tablet       traZODone (DESYREL) 50 MG tablet        No current facility-administered medications for this visit.       Family History:   Family History   Problem Relation Age of Onset " "   No Known Problems Mother     No Known Problems Father     Breast cancer Sister     Prostate cancer Brother     No Known Problems Maternal Aunt     No Known Problems Maternal Uncle     No Known Problems Paternal Aunt     No Known Problems Paternal Uncle     No Known Problems Maternal Grandmother     No Known Problems Maternal Grandfather     No Known Problems Paternal Grandmother     No Known Problems Paternal Grandfather     Breast cancer Sister     Prostate cancer Brother        Social History:  reports that she has never smoked. She has never used smokeless tobacco. She reports previous alcohol use. She reports that she does not use drugs.    Review of Systemas  Constitutional: No change in weight, appetie, fatigue, fever, or night sweats  Eyes: No changes in vision  Ears, Nose, Mouth, Throat, and Face: No hearing problems, ear pain, rummy nose, or sore throat  Respiratory: No shortness of breath or cough  Cardiovascular: No chest pain, palpitations or dizziness  Gastrointestinal: No abdominal pain, hematochezia, melena  Genitourinary: No dysuria, hematuria, nocturia, menstrual problems, post menopausal  Integumentary/Breast: No rashes or itching  Hematologic/Lymphatic: No anemia or bleeding abnormalities  Musculoskeletal: No joint or muscle abnormalities  Neurological: No sensory or motor problems, no headaches  Behavioral/Psych: No depression, anxiety, cognitive problems, or stress  Endocrine: No diabetes or thyroid problems  Allergic/Immunologic: See allergy above    Physical Exam      Vitals:   Vitals:    02/01/22 0924   BP: (!) 163/83   Pulse: 85   Resp: 18   Temp: 97.1 °F (36.2 °C)   TempSrc: Temporal   SpO2: 96%   Weight: 72.4 kg (159 lb 11.2 oz)   Height: 5' 3" (1.6 m)     BMI: Body mass index is 28.29 kg/m².  BSA Body surface area is 1.79 meters squared.  ECOG Performance Status:   ECOG SCORE        ECOG 1    GENERAL APPEARANCE: Well developed, well nourished, in no acute distress.  SKIN: " Inspection of the skin reveals no rashes, ulcerations or petechiae.  HEENT: The sclerae were anicteric and conjunctivae were pink and moist. Extraocular movements were intact and pupils were equal, round, and reactive to light with normal accommodation. External inspection of the ears and nose showed no scars, lesions, or masses. Lips, teeth, and gums showed normal mucosa. The oral mucosa, hard and soft palate, tongue and posterior pharynx were normal.  NECK: Supple and symmetric. There was no thyroid enlargement, and no tenderness, or masses were felt.  CRESPIRATORY: Normal AP diameter and normal contour without any kyphoscoliosis. LUNGS: Auscultation of the lungs revealed normal breath sounds without any other adventitious sounds or rubs.  CARDIOVASCULAR: There was a regular rate and rhythm without any murmurs, gallops, rubs. The carotid pulses were normal and 2+ bilaterally without bruits. Peripheral pulses were 2+ and symmetric.  GASTROINTESTINAL: Soft and nontender with normal bowel sounds. The liver span was approximately 5-6 cm in the right midclavicular line by percussion. The liver edge was nontender. The spleen was not palpable. There were no inguinal or umbilical hernias noted. No ascites was noted.  LYMPH NODES: No lymphadenopathy was appreciated in the neck, axillae or groin.  MUSCULOSKELETAL: Gait was normal. There was no tenderness or effusions noted. Muscle strength and tone were normal. EXTREMITIES: No cyanosis, clubbing or edema.  NEUROLOGIC: Alert and oriented x 3. Normal affect. Gait was normal. Normal deep tendon reflexes with no pathological reflexes. Sensation to touch was normal.  PSYCHIATRIC: good mood, orientated to place, time and person     Labs and Imagings     Lab Results   Component Value Date    WBC 3.8 (L) 02/01/2022    HGB 11.6 (L) 02/01/2022    HCT 37.2 02/01/2022    MCV 90.5 02/01/2022    LABPLAT 188 02/01/2022       CMP  Sodium   Date Value Ref Range Status   02/01/2022 140 135 -  145 mmol/L Final   10/14/2019 133 (L) 136 - 145 mmol/L Final     Potassium   Date Value Ref Range Status   02/01/2022 3.9 3.6 - 5.2 mmol/L Final   10/14/2019 4.4 3.5 - 5.1 mmol/L Final     Chloride   Date Value Ref Range Status   02/01/2022 101 100 - 108 mmol/L Final   10/14/2019 97 (L) 98 - 107 mmol/L Final     CO2   Date Value Ref Range Status   02/01/2022 32 21 - 32 mmol/L Final   10/14/2019 28 22 - 29 mmol/L Final     Glucose   Date Value Ref Range Status   02/01/2022 208 (H) 70 - 110 mg/dL Final     BUN   Date Value Ref Range Status   02/01/2022 19 (H) 7 - 18 mg/dL Final   10/14/2019 15.8 8 - 23 mg/dL Final     Creatinine   Date Value Ref Range Status   02/01/2022 1.09 (H) 0.55 - 1.02 mg/dL Final   10/14/2019 0.74 0.50 - 0.90 mg/dL Final     Calcium   Date Value Ref Range Status   02/01/2022 9.5 8.8 - 10.5 mg/dL Final   10/14/2019 9.9 8.6 - 10.2 mg/dL Final     Total Protein   Date Value Ref Range Status   02/01/2022 6.9 6.4 - 8.2 g/dL Final     Albumin   Date Value Ref Range Status   02/01/2022 3.6 3.4 - 5.0 g/dL Final   10/14/2019 4.3 3.5 - 5.2 g/dL Final     Total Bilirubin   Date Value Ref Range Status   02/01/2022 0.4 0.0 - 1.0 mg/dL Final     Alkaline Phosphatase   Date Value Ref Range Status   02/01/2022 56 46 - 116 U/L Final     AST   Date Value Ref Range Status   02/01/2022 13 (L) 15 - 37 U/L Final   10/14/2019 11 0 - 32 U/L Final     ALT   Date Value Ref Range Status   02/01/2022 15 12 - 78 U/L Final     Anion Gap   Date Value Ref Range Status   02/01/2022 7.0 3.0 - 11.0 mmol/L Final   10/14/2019 8.0 8.0 - 17.0 mmol/L Final     Comment:     NOTE  Testing performed at:  [facility], [address] [place] [address]  [id number] CLIA #:[id number]         All cell laboratory reviewed which shows white cell 1.7 and sodium 126  Imaging: CT C/A/P 12/27/21:  CT Abdomen Pelvis  Without Contrast                                RADIOLOGY REPORT        PT NAME: SANDOVAL GUZMÁN      [facility]  IMAGING     : 1932 F 89             [address]    ACCT: [id number]                                              [facility] Rec #: [id number]                                        [id number]    [facility]             Procedure: CT ABD   PELVIS WO CONTRAST    REQUISITION #: [id number]      REPORT #: [id number]           DATE OF EXAM: 21    TIME OF EXAM: 0845       CT ABD   PELVIS WO CONTRAST    CMS MANDATED QUALITY DATA CT RADIATION 436    *All CT scans at this facility uses dose modulation and/or weight-based   dosing when appropriate to reduce radiation dose to as low as reasonably   achievable.            CLINICAL HISTORY:    History of bladder cancer        TECHNIQUE:    Acquisition: Contiguous scan slices were obtained from the top of the   hemidiaphragm through the pelvis.    Reconstructions: Axial, coronal and sagittal reconstructions.         Contrast:    IV : None given.    Oral :  None administered.    Phases: Noncontrasted imaging.    Limitations: Evaluation of the abdominal viscera and gastrointestinal tract    is limited by the lack of contrast.    Estimated radiation dose: 14.86 mSv.        COMPARISON:    No prior relevant studies are available at this time.        FINDINGS:    Lower chest: Normal.        Abdomen:    Liver:  Normal.        Spleen: Normal.        Gallbladder and biliary tree: Normal.        Pancreas: Normal.        Adrenal glands: Normal.        Urinary tract:    Kidneys: Normal.    Pelvocalyceal systems: Normal. No hydronephrosis.    Ureters: Normal. No hydroureter        Retroperitoneum: Normal.        Mesentery and gastrointestinal tract: Normal.        Pelvis:    Urinary bladder: Normal.        Inguinal regions: Normal.        Vasculature:    1. Arterial calcification without aneurysm is present.        Osseous structures:    1. Degenerative changes noted throughout the spine with levoscoliosis.        Abdominal wall: Normal.         Additional findings: None seen.        IMPRESSION:        1.  No acute intra-abdominal pathology.            This document was created using a voice recognition transcribing system.   Incorrect words or phrases may have been missed during proof reading. Please   interpret accordingly or contact the radiologist for clarification if   necessary.                DICTATING PHYSICIAN:  ELLIE RAMAN MD                   Date Dictated: 21        Signed By:  ELLIE RAMAN MD <Electronically signed by ELLIE RAMAN MD in OV>    Date Signed:  21     CC: DELMI ANTONIO MD ; DELMI ANTONIO MD      ADMITTING PHYSICIAN:                                                                                                    ORDERING PHY: DELMI ANTONIO MD                                                                                                                                                      ATTENDING PHY: DELMI ANTONIO MD    Patient Status:  REG CLI    Admit Service Date: 21       CT Chest Without Contrast                                RADIOLOGY REPORT        PT NAME: SANDOVAL GUZMÁN      [facility] IMAGING     : 1932 F 89             [address] [facility] [facility] [address]    ACCT: [id number]                                              [facility] Rec #: [id number]                                        [id number]    [facility]             Procedure: CHEST THORAX WO CONT    REQUISITION #: [id number]      REPORT #: [id number]           DATE OF EXAM: 21    TIME OF EXAM: 0900       CHEST THORAX WO CONT    CMS MANDATED QUALITY DATA CT RADIATION 436    *All CT scans at this facility uses dose modulation and/or weight-based   dosing when appropriate to reduce radiation dose to as low as reasonably   achievable.        Clinical history:    Bladder cancer        Technique:    Acquisition: Contiguous scan slices were obtained from the apex of the lungs    through the diaphragms.    Reconstructions: Axial, coronal and sagittal. reconstructions of the data   set were obtained.    Contrast:    IV: No contrast was administered.    Other: None administered.    Phases: Noncontrasted images.    Limitations: No technical limitations noted.    Estimated radiation dose: 14.86 mSv.        Comparison:    September 16, 2021        Findings:    Lungs, pleura and large airways:    1. Several small nodules are present in the lungs most notably in the right    middle lobe region. These appear to be stable for size and configuration.   Please continue to be approximately 5-6 mm in size. These appear to be   stable when compared to the previous exam. No new lung nodules are   demonstrated.        Heart:    1 coronary artery calcifications are noted.        Systemic vasculature: Normal.        Pulmonary vasculature: Normal.        Mediastinal and hilar structures: Normal.        Chest wall, axilla and lower neck: Normal.        Upper abdomen: Please see the CT of the abdomen report        Osseous structures:    1. Degenerative changes are noted throughout the spine.        Additional findings: None seen.        Impression        1.  Multiple small pulmonary nodules remain present bilaterally. These   appear to be stable for size and number.            This document was created using a voice recognition transcribing system.   Incorrect words or phrases may have been missed during proof reading. Please   interpret accordingly or contact the radiologist for clarification if   necessary.        DICTATING PHYSICIAN:  ELLIE RAMAN MD                   Date Dictated: 12/28/21 0934        Signed By:  ELLIE RAMAN MD <Electronically signed by ELLIE RAMAN MD in OV>    Date Signed:  12/28/21 0939     CC: DELMI ANTONIO MD ; DELMI ANTONIO MD      ADMITTING PHYSICIAN:                                                                                                    ORDERING PHY:  DELMI ANTONIO MD                                                                                                                                                      ATTENDING PHY: DELMI ANTONIO MD    Patient Status:  REG CLI    Admit Service Date: 12/28/21                   Assessment:     Principle Problem: No primary diagnosis found.   Co-morbidity/Active Problems:   Patient Active Problem List   Diagnosis    Malignant neoplasm of urinary bladder    Essential hypertension    Type 2 diabetes mellitus without complication, without long-term current use of insulin    Encounter for antineoplastic chemotherapy    Chemotherapy-induced nausea    Chemotherapy-induced neutropenia    Absolute anemia    Encounter for immunotherapy    Malignant neoplasm metastatic to both lungs    Elevated blood sugar        Sherice Haji is a 89 y.o. female with PMH of There were no encounter diagnoses., with No primary diagnosis found.   Sherice Haji is a 87 y.o. 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 [C67.3]   87-year-old female otherwise healthy except hypertension diabetes present with bladder mass biopsy-proven muscle invasive bladder cancer( stage II) and     biopsy-proven muscle invasive bladder cancer( stage II) recurrence  Normal renal function  Neutropenia  Status post chemo radiation therapy December 2019  recurrent muscle invasive bladder cance  3/17/21Pulm nodules noted on CT Chest     Plan:  Status post   Overall Plan:  Concurrent chemoradiation therapy, followed by optional cystectomy and full dose chemo C/G; patient does not want chemotherapy so the other option is immunotherapy  12/2/21:  Patient continues to  tolerate Keytruda without any complaint.  her elevated blood sugars are being followed by her PCP   ==CT chest  scan reviewed will request comparison to CT Chest done 3/21 at [facility]  ==RTC in 3 weeks with labs   12/22/21: patient continues to tolerate tx well but some fatigue noted, did not get ct scans done, will help schedule scans prior to next appt. Labs reviewed and pt cleared for tx.  Pt recently moved into [facility] last month.   1/11/22:  Recent CT scans show stable pulmonary nodules, currently no evidence of disease in abdomen and pelvis.  Patient continues to tolerate immunotherapy very well, labs reviewed and patient will continue with current therapy as planned.  She denies any diarrhea, cough, rash or itching  2/1/22: pt doing well, no new c/o. She continues to tolerate tx without any side effects noted. Labs reviewed and pt cleared for tx as planned for Thursday. RTC in 3 weeks with labs.    -Total time spent in counseling and discussion about further management options including relevant lab work, treatment,  prognosis, medications and intended side effects was more than 25 minutes. More than 50% of the time was spent on counseling and coordination of care.  This includes face to face time and non-face to face time preparing to see the patient (eg, review of tests), Obtaining and/or reviewing separately obtained history, Documenting clinical information in the electronic or other health record, Independently interpreting resultsand communicating results to the patient/family/caregiver, or Care coordination.

## 2022-02-21 DIAGNOSIS — C67.9 MALIGNANT NEOPLASM OF URINARY BLADDER, UNSPECIFIED SITE: Primary | ICD-10-CM

## 2022-02-21 DIAGNOSIS — E03.9 HYPOTHYROIDISM, UNSPECIFIED TYPE: ICD-10-CM

## 2022-02-22 ENCOUNTER — CLINICAL SUPPORT (OUTPATIENT)
Dept: HEMATOLOGY/ONCOLOGY | Facility: CLINIC | Age: 87
End: 2022-02-22

## 2022-02-22 ENCOUNTER — OFFICE VISIT (OUTPATIENT)
Dept: HEMATOLOGY/ONCOLOGY | Facility: CLINIC | Age: 87
End: 2022-02-22
Payer: COMMERCIAL

## 2022-02-22 VITALS
HEART RATE: 80 BPM | TEMPERATURE: 97 F | WEIGHT: 158 LBS | OXYGEN SATURATION: 95 % | DIASTOLIC BLOOD PRESSURE: 107 MMHG | SYSTOLIC BLOOD PRESSURE: 191 MMHG | RESPIRATION RATE: 16 BRPM | HEIGHT: 63 IN | BODY MASS INDEX: 28 KG/M2

## 2022-02-22 DIAGNOSIS — E03.9 HYPOTHYROIDISM, UNSPECIFIED TYPE: ICD-10-CM

## 2022-02-22 DIAGNOSIS — C67.9 MALIGNANT NEOPLASM OF URINARY BLADDER, UNSPECIFIED SITE: ICD-10-CM

## 2022-02-22 DIAGNOSIS — C67.9 MALIGNANT NEOPLASM OF URINARY BLADDER, UNSPECIFIED SITE: Primary | ICD-10-CM

## 2022-02-22 LAB
ALBUMIN SERPL BCP-MCNC: 3.6 G/DL (ref 3.4–5)
ALBUMIN/GLOBULIN RATIO: 1.09 RATIO (ref 1.1–1.8)
ALP SERPL-CCNC: 63 U/L (ref 46–116)
ALT SERPL W P-5'-P-CCNC: 11 U/L (ref 12–78)
ANION GAP SERPL CALC-SCNC: 4 MMOL/L (ref 3–11)
AST SERPL-CCNC: 14 U/L (ref 15–37)
BILIRUB SERPL-MCNC: 0.2 MG/DL (ref 0–1)
BUN SERPL-MCNC: 19 MG/DL (ref 7–18)
BUN/CREAT SERPL: 16.96 RATIO (ref 7–18)
CALCIUM SERPL-MCNC: 9.4 MG/DL (ref 8.8–10.5)
CELLS COUNTED: 100
CHLORIDE SERPL-SCNC: 100 MMOL/L (ref 100–108)
CO2 SERPL-SCNC: 34 MMOL/L (ref 21–32)
CREAT SERPL-MCNC: 1.12 MG/DL (ref 0.55–1.02)
EOSINOPHIL NFR BLD: 3 % (ref 1–3)
ERYTHROCYTE [DISTWIDTH] IN BLOOD BY AUTOMATED COUNT: 13.9 % (ref 12.5–18)
GFR ESTIMATION: 56
GLOBULIN: 3.3 G/DL (ref 2.3–3.5)
GLUCOSE SERPL-MCNC: 251 MG/DL (ref 70–110)
HCT VFR BLD AUTO: 36.5 % (ref 37–47)
HGB BLD-MCNC: 11.6 G/DL (ref 12–16)
LYMPHOCYTES NFR BLD: 24 % (ref 25–40)
MCH RBC QN AUTO: 28.5 PG (ref 27–31.2)
MCHC RBC AUTO-ENTMCNC: 31.8 G/DL (ref 31.8–35.4)
MCV RBC AUTO: 89.7 FL (ref 80–97)
MONOCYTES NFR BLD: 11 % (ref 1–15)
NEUTROPHILS # BLD AUTO: 2.1 10*3/UL (ref 1.8–7.7)
NEUTROPHILS NFR BLD: 62 % (ref 37–80)
NUCLEATED RED BLOOD CELLS: 0 %
PLATELETS: 226 10*3/UL (ref 142–424)
POTASSIUM SERPL-SCNC: 3.9 MMOL/L (ref 3.6–5.2)
PROT SERPL-MCNC: 6.9 G/DL (ref 6.4–8.2)
RBC # BLD AUTO: 4.07 10*6/UL (ref 4.2–5.4)
RBC MORPH BLD: ABNORMAL
SMALL PLATELETS BLD QL SMEAR: NORMAL
SODIUM BLD-SCNC: 138 MMOL/L (ref 135–145)
TSH SERPL DL<=0.005 MIU/L-ACNC: 0.74 UIU/ML (ref 0.36–3.74)
WBC # BLD: 3.4 10*3/UL (ref 4.6–10.2)

## 2022-02-22 PROCEDURE — 1101F PT FALLS ASSESS-DOCD LE1/YR: CPT | Mod: CPTII,S$GLB,, | Performed by: NURSE PRACTITIONER

## 2022-02-22 PROCEDURE — 1160F RVW MEDS BY RX/DR IN RCRD: CPT | Mod: CPTII,S$GLB,, | Performed by: NURSE PRACTITIONER

## 2022-02-22 PROCEDURE — 1126F PR PAIN SEVERITY QUANTIFIED, NO PAIN PRESENT: ICD-10-PCS | Mod: CPTII,S$GLB,, | Performed by: NURSE PRACTITIONER

## 2022-02-22 PROCEDURE — 1159F MED LIST DOCD IN RCRD: CPT | Mod: CPTII,S$GLB,, | Performed by: NURSE PRACTITIONER

## 2022-02-22 PROCEDURE — 99214 PR OFFICE/OUTPT VISIT, EST, LEVL IV, 30-39 MIN: ICD-10-PCS | Mod: S$GLB,,, | Performed by: NURSE PRACTITIONER

## 2022-02-22 PROCEDURE — 3288F FALL RISK ASSESSMENT DOCD: CPT | Mod: CPTII,S$GLB,, | Performed by: NURSE PRACTITIONER

## 2022-02-22 PROCEDURE — 99214 OFFICE O/P EST MOD 30 MIN: CPT | Mod: S$GLB,,, | Performed by: NURSE PRACTITIONER

## 2022-02-22 PROCEDURE — 1160F PR REVIEW ALL MEDS BY PRESCRIBER/CLIN PHARMACIST DOCUMENTED: ICD-10-PCS | Mod: CPTII,S$GLB,, | Performed by: NURSE PRACTITIONER

## 2022-02-22 PROCEDURE — 1101F PR PT FALLS ASSESS DOC 0-1 FALLS W/OUT INJ PAST YR: ICD-10-PCS | Mod: CPTII,S$GLB,, | Performed by: NURSE PRACTITIONER

## 2022-02-22 PROCEDURE — 1159F PR MEDICATION LIST DOCUMENTED IN MEDICAL RECORD: ICD-10-PCS | Mod: CPTII,S$GLB,, | Performed by: NURSE PRACTITIONER

## 2022-02-22 PROCEDURE — 3288F PR FALLS RISK ASSESSMENT DOCUMENTED: ICD-10-PCS | Mod: CPTII,S$GLB,, | Performed by: NURSE PRACTITIONER

## 2022-02-22 PROCEDURE — 1126F AMNT PAIN NOTED NONE PRSNT: CPT | Mod: CPTII,S$GLB,, | Performed by: NURSE PRACTITIONER

## 2022-02-22 RX ORDER — SODIUM CHLORIDE 0.9 % (FLUSH) 0.9 %
10 SYRINGE (ML) INJECTION
Status: CANCELLED | OUTPATIENT
Start: 2022-02-24

## 2022-02-22 RX ORDER — HEPARIN 100 UNIT/ML
500 SYRINGE INTRAVENOUS
Status: CANCELLED | OUTPATIENT
Start: 2022-02-24

## 2022-02-22 NOTE — PROGRESS NOTES
Hematology Oncology Progress Note    Hematology Oncology  [facility]     PATIENT: Sherice Haji  : 1932 89 y.o. female  MRN [id number]     PCP: Sejal Castillo MD  Consult Requested By:  No att. providers found    Date of Service: 2022    Subjective:   Interim History:  Maliganant neoplasm of urinary bladder, unspecified site    The patient doing well      Oncology History:    Sherice Haji is here for to followup for bladder cancer.  Current therapy patient doing well without new complaints she specifically denies hematuria or weight loss she has a good appetite.  This is a 87 y.o. female patient with a history of The primary encounter diagnosis was Malignant 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. Diagnoses of Essential hypertension and Type 2 diabetes mellitus without complication, without long-term current use of insulin were also pertinent to this visit., who is referred here for evaluation treatment of bladder cancer.  The patient has been feeling weak and for T for about 2 months.  Urinalysis shows hematuria or and she was referred to Urology.  CT scan with IV contrast demonstrates a bladder mass. Biopsy shows muscle invasive bladder cancer.     The patient lost about 10 lb over the past 2 months.  She denies any abdominal pain chest pain short of breath.     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 Constipation. Prior surgeries. Densely calcified coronary arteries.     ==11/2020 Keytruda restarted  ==5/2021 C scope  A scar as well as necrotic tissue identified in the left superior wall of the bladder consistent with the area of previous resection.  No evidence of recurrent disease.  Ureteral orifices of normal shape,  location and sizes effluxing clear urine.       Oncology History   Malignant neoplasm of urinary bladder   9/25/2019 Initial Diagnosis    Malignant 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     10/21/2019 Cancer Staged    Staging form: Urinary Bladder, AJCC 8th Edition  - Clinical: Stage II (cT2, cN0, cM0)     10/31/2019 - 12/11/2019 Chemotherapy    Treatment Summary   Plan Name: cisplatin taxol weekly  Treatment Goal: Curative  Status: Inactive  Start Date: 10/31/2019  End Date: 12/5/2019  Provider: Hoda Josue NP  Chemotherapy: CISplatin (PLATINOL) 20 mg/m2 = 34 mg in sodium chloride 0.9% 500 mL chemo infusion, 20 mg/m2 = 34 mg (100 % of original dose 20 mg/m2), Intravenous, Clinic/HOD 1 time, 6 of 6 cycles  Dose modification: 20 mg/m2 (original dose 20 mg/m2, Cycle 1)  PACLitaxel (TAXOL) 50 mg/m2 = 84 mg in sodium chloride 0.9% 500 mL chemo infusion, 50 mg/m2 = 84 mg (100 % of original dose 50 mg/m2), Intravenous, Clinic/HOD 1 time, 6 of 6 cycles  Dose modification: 50 mg/m2 (original dose 50 mg/m2, Cycle 1)     6/24/2020 - 6/24/2020 Chemotherapy    Treatment Summary   Plan Name: OP BLADDER GEMCITABINE CISPLATIN (SPLIT DOSE CISPLATIN) Q3W  Treatment Goal: Curative  Status: Inactive  Start Date:   End Date:   Provider: Misael Gordon MD  Chemotherapy: CISplatin (PLATINOL) 35 mg/m2 = 62 mg in sodium chloride 0.9% 500 mL chemo infusion, 35 mg/m2, Intravenous, Clinic/HOD 1 time, 0 of 4 cycles  gemcitabine (GEMZAR) 1,780 mg in sodium chloride 0.9% 250 mL chemo infusion, 1,000 mg/m2, Intravenous, Clinic/HOD 1 time, 0 of 4 cycles     11/11/2020 -   "Chemotherapy    Treatment Summary   Plan Name: OP PEMBROLIZUMAB   Treatment Goal: Control  Status: Active  Start Date: 11/11/2020  End Date: 4/6/2022 (Planned)  Provider: Misael Gordon MD  Chemotherapy: pembrolizumab (KEYTRUDA) 200 mg in sodium chloride 0.9% 100 mL chemo infusion, 200 mg, Intravenous, Clinic/HOD 1 time, 20 of 23 cycles  Dose modification: 400 mg (original dose 200 mg, Cycle 15), 200 mg (original dose 200 mg, Cycle 15), 200 mg (original dose 200 mg, Cycle 16), 200 mg (original dose 200 mg, Cycle 17)         Oncologic History:      Oncologic History     Oncologic Treatment     Pathology      Past Medical History:   Past Medical History:   Diagnosis Date    Absolute anemia 12/5/2019    Allergy     Anxiety     Arthritis     Bladder cancer     Cancer     Cataract     Removed in May 2019    Depression     Diabetes mellitus     Hypertension     Lung cancer     Malignant 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 9/25/2019    Type 2 diabetes mellitus without complication, without long-term current use of insulin 9/25/2019       Past Surgical HIstory:   Past Surgical History:   Procedure Laterality Date    BLADDER SURGERY      surgery on July 22 2020 to see if patients cancer was still present    BLADDER TUMOR EXCISION  09/2019    CATARACT EXTRACTION, BILATERAL  05/2019       Allergies:  Review of patient's allergies indicates:   Allergen Reactions    Aspirin        Medications:  Current Outpatient Medications   Medication Sig Dispense Refill    BD ALCOHOL SWABS PadM       clopidogreL (PLAVIX) 75 mg tablet       diphenoxylate-atropine 2.5-0.025 mg (LOMOTIL) 2.5-0.025 mg per tablet       DROPLET PEN NEEDLE 32 gauge x 5/32" Ndle       glimepiride (AMARYL) 2 MG tablet       HYDROcodone-acetaminophen (NORCO) 5-325 mg per tablet       hyoscyamine (LEVSIN/SL) 0.125 mg Subl       indapamide (LOZOL) 2.5 MG Tab       LANTUS SOLOSTAR U-100 INSULIN glargine 100 units/mL (3mL) SubQ pen    "    levothyroxine (SYNTHROID) 75 MCG tablet       loratadine (CLARITIN) 10 mg tablet       losartan (COZAAR) 100 MG tablet Take 1 tablet by mouth once daily.      meclizine (ANTIVERT) 12.5 mg tablet       meloxicam (MOBIC) 7.5 MG tablet       metFORMIN (GLUCOPHAGE-XR) 500 MG 24 hr tablet       metoprolol tartrate (LOPRESSOR) 50 MG tablet       simvastatin (ZOCOR) 20 MG tablet       traZODone (DESYREL) 50 MG tablet        No current facility-administered medications for this visit.       Family History:   Family History   Problem Relation Age of Onset    No Known Problems Mother     No Known Problems Father     Breast cancer Sister     Prostate cancer Brother     No Known Problems Maternal Aunt     No Known Problems Maternal Uncle     No Known Problems Paternal Aunt     No Known Problems Paternal Uncle     No Known Problems Maternal Grandmother     No Known Problems Maternal Grandfather     No Known Problems Paternal Grandmother     No Known Problems Paternal Grandfather     Breast cancer Sister     Prostate cancer Brother        Social History:  reports that she has never smoked. She has never used smokeless tobacco. She reports previous alcohol use. She reports that she does not use drugs.    Review of Systemas  Constitutional: No change in weight, appetie, fatigue, fever, or night sweats  Eyes: No changes in vision  Ears, Nose, Mouth, Throat, and Face: No hearing problems, ear pain, rummy nose, or sore throat  Respiratory: No shortness of breath or cough  Cardiovascular: No chest pain, palpitations or dizziness  Gastrointestinal: No abdominal pain, hematochezia, melena  Genitourinary: No dysuria, hematuria, nocturia, menstrual problems, post menopausal  Integumentary/Breast: No rashes or itching  Hematologic/Lymphatic: No anemia or bleeding abnormalities  Musculoskeletal: No joint or muscle abnormalities  Neurological: No sensory or motor problems, no headaches  Behavioral/Psych: No  "depression, anxiety, cognitive problems, or stress  Endocrine: No diabetes or thyroid problems  Allergic/Immunologic: See allergy above    Physical Exam      Vitals:   Vitals:    02/22/22 0957   BP: (!) 191/107   BP Location: Right arm   Patient Position: Sitting   BP Method: Medium (Automatic)   Pulse: 80   Resp: 16   Temp: 96.5 °F (35.8 °C)   TempSrc: Temporal   SpO2: 95%   Weight: 71.7 kg (158 lb)   Height: 5' 3" (1.6 m)     BMI: Body mass index is 27.99 kg/m².  BSA Body surface area is 1.79 meters squared.  ECOG Performance Status:   ECOG SCORE        ECOG 1    GENERAL APPEARANCE: Well developed, well nourished, in no acute distress.  SKIN: Inspection of the skin reveals no rashes, ulcerations or petechiae.  HEENT: The sclerae were anicteric and conjunctivae were pink and moist. Extraocular movements were intact and pupils were equal, round, and reactive to light with normal accommodation. External inspection of the ears and nose showed no scars, lesions, or masses. Lips, teeth, and gums showed normal mucosa. The oral mucosa, hard and soft palate, tongue and posterior pharynx were normal.  NECK: Supple and symmetric. There was no thyroid enlargement, and no tenderness, or masses were felt.  CRESPIRATORY: Normal AP diameter and normal contour without any kyphoscoliosis. LUNGS: Auscultation of the lungs revealed normal breath sounds without any other adventitious sounds or rubs.  CARDIOVASCULAR: There was a regular rate and rhythm without any murmurs, gallops, rubs. The carotid pulses were normal and 2+ bilaterally without bruits. Peripheral pulses were 2+ and symmetric.  GASTROINTESTINAL: Soft and nontender with normal bowel sounds. The liver span was approximately 5-6 cm in the right midclavicular line by percussion. The liver edge was nontender. The spleen was not palpable. There were no inguinal or umbilical hernias noted. No ascites was noted.  LYMPH NODES: No lymphadenopathy was appreciated in the neck, " axillae or groin.  MUSCULOSKELETAL: Gait was normal. There was no tenderness or effusions noted. Muscle strength and tone were normal. EXTREMITIES: No cyanosis, clubbing or edema.  NEUROLOGIC: Alert and oriented x 3. Normal affect. Gait was normal. Normal deep tendon reflexes with no pathological reflexes. Sensation to touch was normal.  PSYCHIATRIC: good mood, orientated to place, time and person     Labs and Imagings     Lab Results   Component Value Date    WBC 3.4 (L) 02/22/2022    HGB 11.6 (L) 02/22/2022    HCT 36.5 (L) 02/22/2022    MCV 89.7 02/22/2022    LABPLAT 226 02/22/2022       CMP  Sodium   Date Value Ref Range Status   02/22/2022 138 135 - 145 mmol/L Final   10/14/2019 133 (L) 136 - 145 mmol/L Final     Potassium   Date Value Ref Range Status   02/22/2022 3.9 3.6 - 5.2 mmol/L Final   10/14/2019 4.4 3.5 - 5.1 mmol/L Final     Chloride   Date Value Ref Range Status   02/22/2022 100 100 - 108 mmol/L Final   10/14/2019 97 (L) 98 - 107 mmol/L Final     CO2   Date Value Ref Range Status   02/22/2022 34 (H) 21 - 32 mmol/L Final   10/14/2019 28 22 - 29 mmol/L Final     Glucose   Date Value Ref Range Status   02/22/2022 251 (H) 70 - 110 mg/dL Final     BUN   Date Value Ref Range Status   02/22/2022 19 (H) 7 - 18 mg/dL Final   10/14/2019 15.8 8 - 23 mg/dL Final     Creatinine   Date Value Ref Range Status   02/22/2022 1.12 (H) 0.55 - 1.02 mg/dL Final   10/14/2019 0.74 0.50 - 0.90 mg/dL Final     Calcium   Date Value Ref Range Status   02/22/2022 9.4 8.8 - 10.5 mg/dL Final   10/14/2019 9.9 8.6 - 10.2 mg/dL Final     Total Protein   Date Value Ref Range Status   02/22/2022 6.9 6.4 - 8.2 g/dL Final     Albumin   Date Value Ref Range Status   02/22/2022 3.6 3.4 - 5.0 g/dL Final   10/14/2019 4.3 3.5 - 5.2 g/dL Final     Total Bilirubin   Date Value Ref Range Status   02/22/2022 0.2 0.0 - 1.0 mg/dL Final     Alkaline Phosphatase   Date Value Ref Range Status   02/22/2022 63 46 - 116 U/L Final     AST   Date Value Ref  Range Status   2022 14 (L) 15 - 37 U/L Final   10/14/2019 11 0 - 32 U/L Final     ALT   Date Value Ref Range Status   2022 11 (L) 12 - 78 U/L Final     Anion Gap   Date Value Ref Range Status   2022 4.0 3.0 - 11.0 mmol/L Final   10/14/2019 8.0 8.0 - 17.0 mmol/L Final     Comment:     NOTE  Testing performed at:  [facility], [address] [place] [address]  [address] CLIA #:[id number]         All cell laboratory reviewed which shows white cell 1.7 and sodium 126  Imaging: CT C/A/P 21:  CT Abdomen Pelvis  Without Contrast                                RADIOLOGY REPORT        PT NAME: SANDOVAL GUZMÁN      [facility] IMAG[facility]     : 1932 F 89             [address]    ACCT: [id number]                                              [place], [place]    Med Rec #: [id number]                                        [id number]    [facility]             Procedure: CT ABD   PELVIS WO CONTRAST    REQUISITION #: [id number]      REPORT #: [id number]           DATE OF EXAM: 21    TIME OF EXAM: 0845       CT ABD   PELVIS WO CONTRAST    CMS MANDATED QUALITY DATA CT RADIATION 436    *All CT scans at this facility uses dose modulation and/or weight-based   dosing when appropriate to reduce radiation dose to as low as reasonably   achievable.            CLINICAL HISTORY:    History of bladder cancer        TECHNIQUE:    Acquisition: Contiguous scan slices were obtained from the top of the   hemidiaphragm through the pelvis.    Reconstructions: Axial, coronal and sagittal reconstructions.         Contrast:    IV : None given.    Oral :  None administered.    Phases: Noncontrasted imaging.    Limitations: Evaluation of the abdominal viscera and gastrointestinal tract    is limited by the lack of contrast.    Estimated radiation dose: 14.86 mSv.        COMPARISON:    No prior relevant studies are available at this time.        FINDINGS:    Lower chest:  Normal.        Abdomen:    Liver:  Normal.        Spleen: Normal.        Gallbladder and biliary tree: Normal.        Pancreas: Normal.        Adrenal glands: Normal.        Urinary tract:    Kidneys: Normal.    Pelvocalyceal systems: Normal. No hydronephrosis.    Ureters: Normal. No hydroureter        Retroperitoneum: Normal.        Mesentery and gastrointestinal tract: Normal.        Pelvis:    Urinary bladder: Normal.        Inguinal regions: Normal.        Vasculature:    1. Arterial calcification without aneurysm is present.        Osseous structures:    1. Degenerative changes noted throughout the spine with levoscoliosis.        Abdominal wall: Normal.        Additional findings: None seen.        IMPRESSION:        1.  No acute intra-abdominal pathology.            This document was created using a voice recognition transcribing system.   Incorrect words or phrases may have been missed during proof reading. Please   interpret accordingly or contact the radiologist for clarification if   necessary.                DICTATING PHYSICIAN:  ELLIE RAMAN MD                   Date Dictated: 21        Signed By:  ELLIE RAMAN MD <Electronically signed by ELLIE RAMAN MD in OV>    Date Signed:  2188     CC: DELMI ANTONIO MD ; DELMI ANTONIO MD      ADMITTING PHYSICIAN:                                                                                                    ORDERING PHY: DELMI ANTONIO MD                                                                                                                                                      ATTENDING PHY: DELMI ANTONIO MD    Patient Status:  REG CLI    Admit Service Date: 21       CT Chest Without Contrast                                RADIOLOGY REPORT        PT NAME: SANDOVAL GUZMÁN      [facility] IMAGING     : 1932 F 89             [address]    ACCT: [id number]                                               [place], [place]    [facility] Rec #: [id number]                                        [id number]    [facility]             Procedure: CHEST THORAX WO CONT    REQUISITION #: [id number]      REPORT #: [id number]           DATE OF EXAM: 12/28/21    TIME OF EXAM: 0900       CHEST THORAX WO CONT    CMS MANDATED QUALITY DATA CT RADIATION 436    *All CT scans at this facility uses dose modulation and/or weight-based   dosing when appropriate to reduce radiation dose to as low as reasonably   achievable.        Clinical history:    Bladder cancer        Technique:    Acquisition: Contiguous scan slices were obtained from the apex of the lungs   through the diaphragms.    Reconstructions: Axial, coronal and sagittal. reconstructions of the data   set were obtained.    Contrast:    IV: No contrast was administered.    Other: None administered.    Phases: Noncontrasted images.    Limitations: No technical limitations noted.    Estimated radiation dose: 14.86 mSv.        Comparison:    September 16, 2021        Findings:    Lungs, pleura and large airways:    1. Several small nodules are present in the lungs most notably in the right    middle lobe region. These appear to be stable for size and configuration.   Please continue to be approximately 5-6 mm in size. These appear to be   stable when compared to the previous exam. No new lung nodules are   demonstrated.        Heart:    1 coronary artery calcifications are noted.        Systemic vasculature: Normal.        Pulmonary vasculature: Normal.        Mediastinal and hilar structures: Normal.        Chest wall, axilla and lower neck: Normal.        Upper abdomen: Please see the CT of the abdomen report        Osseous structures:    1. Degenerative changes are noted throughout the spine.        Additional findings: None seen.        Impression        1.  Multiple small pulmonary nodules remain present bilaterally. These   appear to be stable for  size and number.            This document was created using a voice recognition transcribing system.   Incorrect words or phrases may have been missed during proof reading. Please   interpret accordingly or contact the radiologist for clarification if   necessary.        DICTATING PHYSICIAN:  ELLIE RAMAN MD                   Date Dictated: 12/28/21 0934        Signed By:  ELLIE RAMAN MD <Electronically signed by ELLIE RAMAN MD in OV>    Date Signed:  12/28/21 0939     CC: DELMI ANTONIO MD ; DELMI ANTONIO MD      ADMITTING PHYSICIAN:                                                                                                    ORDERING PHY: DELMI ANTONIO MD                                                                                                                                                      ATTENDING PHY: DELMI ANTONIO MD    Patient Status:  REG CLI    Admit Service Date: 12/28/21                   Assessment:     Principle Problem: No primary diagnosis found.   Co-morbidity/Active Problems:   Patient Active Problem List   Diagnosis    Malignant neoplasm of urinary bladder    Essential hypertension    Type 2 diabetes mellitus without complication, without long-term current use of insulin    Encounter for antineoplastic chemotherapy    Chemotherapy-induced nausea    Chemotherapy-induced neutropenia    Absolute anemia    Encounter for immunotherapy    Malignant neoplasm metastatic to both lungs    Elevated blood sugar        Sherice Haji is a 89 y.o. female with PMH of There were no encounter diagnoses., with No primary diagnosis found.   Sherice Haji is a 87 y.o. 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 Diagnoses of Essential hypertension and Type 2 diabetes mellitus without complication, without long-term current use of insulin were also pertinent to this visit., with Malignant neoplasm of  anterior wall of urinary bladder [C67.3]   87-year-old female otherwise healthy except hypertension diabetes present with bladder mass biopsy-proven muscle invasive bladder cancer( stage II) and     biopsy-proven muscle invasive bladder cancer( stage II) recurrence  Normal renal function  Neutropenia  Status post chemo radiation therapy 2019  recurrent muscle invasive bladder cance  3/17/21Pulm nodules noted on CT Chest     Plan:  Status post   Overall Plan:  Concurrent chemoradiation therapy, followed by optional cystectomy and full dose chemo C/G; patient does not want chemotherapy so the other option is immunotherapy  21:  Patient continues to tolerate Keytruda without any complaint.  her elevated blood sugars are being followed by her PCP   ==CT chest  scan reviewed will request comparison to CT Chest done 3/21 at [facility]  ==RTC in 3 weeks with labs   21: patient continues to tolerate tx well but some fatigue noted, did not get ct scans done, will help schedule scans prior to next appt. Labs reviewed and pt cleared for tx.  Pt recently moved into [facility] last month.   22:  Recent CT scans show stable pulmonary nodules, currently no evidence of disease in abdomen and pelvis.  Patient continues to tolerate immunotherapy very well, labs reviewed and patient will continue with current therapy as planned.  She denies any diarrhea, cough, rash or itching                                                        Hematology Oncology Progress Note    Hematology Oncology  [facility]     PATIENT: Sherice Haji  : 1932 89 y.o. female  MRN [id number]     PCP: Sejal Castillo MD  Consult Requested By:  No att. providers found    Date of Service: 2022    Subjective:   Interim History:  Maliganant neoplasm of urinary bladder, unspecified site    The patient doing well      Oncology History:    Sherice Haji is here for to followup for bladder cancer.   Current therapy patient doing well without new complaints she specifically denies hematuria or weight loss she has a good appetite.  This is a 87 y.o. female patient with a history of The primary encounter diagnosis was Malignant 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. Diagnoses of Essential hypertension and Type 2 diabetes mellitus without complication, without long-term current use of insulin were also pertinent to this visit., who is referred here for evaluation treatment of bladder cancer.  The patient has been feeling weak and for T for about 2 months.  Urinalysis shows hematuria or and she was referred to Urology.  CT scan with IV contrast demonstrates a bladder mass. Biopsy shows muscle invasive bladder cancer.     The patient lost about 10 lb over the past 2 months.  She denies any abdominal pain chest pain short of breath.     ==12/2019 completed concurrent chemoradiation therapy as a bladder preservation  ==4/2020 Cyst NEO in bladder  ==4/2020 discussed a chemotherapy options such as cisplatin gemcitabine as a 20 % dose reduction; she declined chemotherapy  ==7/2020 recurrence muscle invasive bladder cancer on cystoscope  ==8/2020 we discussed options for her; for those chemotherapy with cisplatin gemcitabine versus immunotherapy with ago to induce local response and hopefully bladder preservation as a salvage; she prefer immunotherapy  == she was displaced by H Alessandra  ==11/30/20  CT There is some wall thickening along the left anterior aspect of the bladder unchanged. Constipation. Prior surgeries. Densely calcified coronary arteries.     ==11/2020 Keytruda restarted  ==5/2021 C scope  A scar as well as necrotic tissue identified in the left superior wall of the bladder consistent with the area of previous resection.  No evidence of recurrent disease.  Ureteral orifices of normal shape,  location and sizes effluxing clear urine.       Oncology History   Malignant neoplasm of urinary bladder    9/25/2019 Initial Diagnosis    Malignant 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     10/21/2019 Cancer Staged    Staging form: Urinary Bladder, AJCC 8th Edition  - Clinical: Stage II (cT2, cN0, cM0)     10/31/2019 - 12/11/2019 Chemotherapy    Treatment Summary   Plan Name: cisplatin taxol weekly  Treatment Goal: Curative  Status: Inactive  Start Date: 10/31/2019  End Date: 12/5/2019  Provider: Hoda Josue NP  Chemotherapy: CISplatin (PLATINOL) 20 mg/m2 = 34 mg in sodium chloride 0.9% 500 mL chemo infusion, 20 mg/m2 = 34 mg (100 % of original dose 20 mg/m2), Intravenous, Clinic/HOD 1 time, 6 of 6 cycles  Dose modification: 20 mg/m2 (original dose 20 mg/m2, Cycle 1)  PACLitaxel (TAXOL) 50 mg/m2 = 84 mg in sodium chloride 0.9% 500 mL chemo infusion, 50 mg/m2 = 84 mg (100 % of original dose 50 mg/m2), Intravenous, Clinic/HOD 1 time, 6 of 6 cycles  Dose modification: 50 mg/m2 (original dose 50 mg/m2, Cycle 1)     6/24/2020 - 6/24/2020 Chemotherapy    Treatment Summary   Plan Name: OP BLADDER GEMCITABINE CISPLATIN (SPLIT DOSE CISPLATIN) Q3W  Treatment Goal: Curative  Status: Inactive  Start Date:   End Date:   Provider: Misael Gordon MD  Chemotherapy: CISplatin (PLATINOL) 35 mg/m2 = 62 mg in sodium chloride 0.9% 500 mL chemo infusion, 35 mg/m2, Intravenous, Clinic/HOD 1 time, 0 of 4 cycles  gemcitabine (GEMZAR) 1,780 mg in sodium chloride 0.9% 250 mL chemo infusion, 1,000 mg/m2, Intravenous, Clinic/HOD 1 time, 0 of 4 cycles     11/11/2020 -  Chemotherapy    Treatment Summary   Plan Name: OP PEMBROLIZUMAB   Treatment Goal: Control  Status: Active  Start Date: 11/11/2020  End Date: 4/6/2022 (Planned)  Provider: Misael Gordon MD  Chemotherapy: pembrolizumab (KEYTRUDA) 200 mg in sodium chloride 0.9% 100 mL chemo infusion, 200 mg, Intravenous, Clinic/HOD 1 time, 20 of 23 cycles  Dose modification: 400 mg (original dose 200 mg, Cycle 15), 200 mg (original dose 200 mg, Cycle 15), 200 mg (original dose 200 mg, Cycle  "16), 200 mg (original dose 200 mg, Cycle 17)         Oncologic History:      Oncologic History     Oncologic Treatment     Pathology      Past Medical History:   Past Medical History:   Diagnosis Date    Absolute anemia 12/5/2019    Allergy     Anxiety     Arthritis     Bladder cancer     Cancer     Cataract     Removed in May 2019    Depression     Diabetes mellitus     Hypertension     Lung cancer     Malignant 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 9/25/2019    Type 2 diabetes mellitus without complication, without long-term current use of insulin 9/25/2019       Past Surgical HIstory:   Past Surgical History:   Procedure Laterality Date    BLADDER SURGERY      surgery on July 22 2020 to see if patients cancer was still present    BLADDER TUMOR EXCISION  09/2019    CATARACT EXTRACTION, BILATERAL  05/2019       Allergies:  Review of patient's allergies indicates:   Allergen Reactions    Aspirin        Medications:  Current Outpatient Medications   Medication Sig Dispense Refill    BD ALCOHOL SWABS PadM       clopidogreL (PLAVIX) 75 mg tablet       diphenoxylate-atropine 2.5-0.025 mg (LOMOTIL) 2.5-0.025 mg per tablet       DROPLET PEN NEEDLE 32 gauge x 5/32" Ndle       glimepiride (AMARYL) 2 MG tablet       HYDROcodone-acetaminophen (NORCO) 5-325 mg per tablet       hyoscyamine (LEVSIN/SL) 0.125 mg Subl       indapamide (LOZOL) 2.5 MG Tab       LANTUS SOLOSTAR U-100 INSULIN glargine 100 units/mL (3mL) SubQ pen       levothyroxine (SYNTHROID) 75 MCG tablet       loratadine (CLARITIN) 10 mg tablet       losartan (COZAAR) 100 MG tablet Take 1 tablet by mouth once daily.      meclizine (ANTIVERT) 12.5 mg tablet       meloxicam (MOBIC) 7.5 MG tablet       metFORMIN (GLUCOPHAGE-XR) 500 MG 24 hr tablet       metoprolol tartrate (LOPRESSOR) 50 MG tablet       simvastatin (ZOCOR) 20 MG tablet       traZODone (DESYREL) 50 MG tablet        No current facility-administered " "medications for this visit.       Family History:   Family History   Problem Relation Age of Onset    No Known Problems Mother     No Known Problems Father     Breast cancer Sister     Prostate cancer Brother     No Known Problems Maternal Aunt     No Known Problems Maternal Uncle     No Known Problems Paternal Aunt     No Known Problems Paternal Uncle     No Known Problems Maternal Grandmother     No Known Problems Maternal Grandfather     No Known Problems Paternal Grandmother     No Known Problems Paternal Grandfather     Breast cancer Sister     Prostate cancer Brother        Social History:  reports that she has never smoked. She has never used smokeless tobacco. She reports previous alcohol use. She reports that she does not use drugs.    Review of Systemas  Constitutional: No change in weight, appetie, fatigue, fever, or night sweats  Eyes: No changes in vision  Ears, Nose, Mouth, Throat, and Face: No hearing problems, ear pain, rummy nose, or sore throat  Respiratory: No shortness of breath or cough  Cardiovascular: No chest pain, palpitations or dizziness  Gastrointestinal: No abdominal pain, hematochezia, melena  Genitourinary: No dysuria, hematuria, nocturia, menstrual problems, post menopausal  Integumentary/Breast: No rashes or itching  Hematologic/Lymphatic: No anemia or bleeding abnormalities  Musculoskeletal: No joint or muscle abnormalities  Neurological: No sensory or motor problems, no headaches  Behavioral/Psych: No depression, anxiety, cognitive problems, or stress  Endocrine: No diabetes or thyroid problems  Allergic/Immunologic: See allergy above    Physical Exam      Vitals:   Vitals:    02/22/22 0957   BP: (!) 191/107   BP Location: Right arm   Patient Position: Sitting   BP Method: Medium (Automatic)   Pulse: 80   Resp: 16   Temp: 96.5 °F (35.8 °C)   TempSrc: Temporal   SpO2: 95%   Weight: 71.7 kg (158 lb)   Height: 5' 3" (1.6 m)     BMI: Body mass index is 27.99 kg/m².  BSA " Body surface area is 1.79 meters squared.  ECOG Performance Status:   ECOG SCORE        ECOG 1    GENERAL APPEARANCE: Well developed, well nourished, in no acute distress.  SKIN: Inspection of the skin reveals no rashes, ulcerations or petechiae.  HEENT: The sclerae were anicteric and conjunctivae were pink and moist. Extraocular movements were intact and pupils were equal, round, and reactive to light with normal accommodation. External inspection of the ears and nose showed no scars, lesions, or masses. Lips, teeth, and gums showed normal mucosa. The oral mucosa, hard and soft palate, tongue and posterior pharynx were normal.  NECK: Supple and symmetric. There was no thyroid enlargement, and no tenderness, or masses were felt.  CRESPIRATORY: Normal AP diameter and normal contour without any kyphoscoliosis. LUNGS: Auscultation of the lungs revealed normal breath sounds without any other adventitious sounds or rubs.  CARDIOVASCULAR: There was a regular rate and rhythm without any murmurs, gallops, rubs. The carotid pulses were normal and 2+ bilaterally without bruits. Peripheral pulses were 2+ and symmetric.  GASTROINTESTINAL: Soft and nontender with normal bowel sounds. The liver span was approximately 5-6 cm in the right midclavicular line by percussion. The liver edge was nontender. The spleen was not palpable. There were no inguinal or umbilical hernias noted. No ascites was noted.  LYMPH NODES: No lymphadenopathy was appreciated in the neck, axillae or groin.  MUSCULOSKELETAL: Gait was normal. There was no tenderness or effusions noted. Muscle strength and tone were normal. EXTREMITIES: No cyanosis, clubbing or edema.  NEUROLOGIC: Alert and oriented x 3. Normal affect. Gait was normal. Normal deep tendon reflexes with no pathological reflexes. Sensation to touch was normal.  PSYCHIATRIC: good mood, orientated to place, time and person     Labs and Imagings     Lab Results   Component Value Date    WBC 3.4 (L)  02/22/2022    HGB 11.6 (L) 02/22/2022    HCT 36.5 (L) 02/22/2022    MCV 89.7 02/22/2022    LABPLAT 226 02/22/2022       CMP  Sodium   Date Value Ref Range Status   02/22/2022 138 135 - 145 mmol/L Final   10/14/2019 133 (L) 136 - 145 mmol/L Final     Potassium   Date Value Ref Range Status   02/22/2022 3.9 3.6 - 5.2 mmol/L Final   10/14/2019 4.4 3.5 - 5.1 mmol/L Final     Chloride   Date Value Ref Range Status   02/22/2022 100 100 - 108 mmol/L Final   10/14/2019 97 (L) 98 - 107 mmol/L Final     CO2   Date Value Ref Range Status   02/22/2022 34 (H) 21 - 32 mmol/L Final   10/14/2019 28 22 - 29 mmol/L Final     Glucose   Date Value Ref Range Status   02/22/2022 251 (H) 70 - 110 mg/dL Final     BUN   Date Value Ref Range Status   02/22/2022 19 (H) 7 - 18 mg/dL Final   10/14/2019 15.8 8 - 23 mg/dL Final     Creatinine   Date Value Ref Range Status   02/22/2022 1.12 (H) 0.55 - 1.02 mg/dL Final   10/14/2019 0.74 0.50 - 0.90 mg/dL Final     Calcium   Date Value Ref Range Status   02/22/2022 9.4 8.8 - 10.5 mg/dL Final   10/14/2019 9.9 8.6 - 10.2 mg/dL Final     Total Protein   Date Value Ref Range Status   02/22/2022 6.9 6.4 - 8.2 g/dL Final     Albumin   Date Value Ref Range Status   02/22/2022 3.6 3.4 - 5.0 g/dL Final   10/14/2019 4.3 3.5 - 5.2 g/dL Final     Total Bilirubin   Date Value Ref Range Status   02/22/2022 0.2 0.0 - 1.0 mg/dL Final     Alkaline Phosphatase   Date Value Ref Range Status   02/22/2022 63 46 - 116 U/L Final     AST   Date Value Ref Range Status   02/22/2022 14 (L) 15 - 37 U/L Final   10/14/2019 11 0 - 32 U/L Final     ALT   Date Value Ref Range Status   02/22/2022 11 (L) 12 - 78 U/L Final     Anion Gap   Date Value Ref Range Status   02/22/2022 4.0 3.0 - 11.0 mmol/L Final   10/14/2019 8.0 8.0 - 17.0 mmol/L Final     Comment:     NOTE  Testing performed at:  [facility], [address] [place] [address] CLIA #:[id number]         All cell laboratory reviewed which shows white cell  1.7 and sodium 126  Imaging: CT C/A/P 21:  CT Abdomen Pelvis  Without Contrast                                RADIOLOGY REPORT        PT NAME: SANDOVAL GUZMÁN      [facility]     : 1932 F 89             [address]    ACCT: [id number]                                              [place]    [facility] Rec #: [id number]                                        [id number]    [facility]             Procedure: CT ABD   PELVIS WO CONTRAST    REQUISITION #: [id number]      REPORT #: [id number]           DATE OF EXAM: 21    TIME OF EXAM: 845       CT ABD   PELVIS WO CONTRAST    CMS MANDATED QUALITY DATA CT RADIATION 436    *All CT scans at this facility uses dose modulation and/or weight-based   dosing when appropriate to reduce radiation dose to as low as reasonably   achievable.            CLINICAL HISTORY:    History of bladder cancer        TECHNIQUE:    Acquisition: Contiguous scan slices were obtained from the top of the   hemidiaphragm through the pelvis.    Reconstructions: Axial, coronal and sagittal reconstructions.         Contrast:    IV : None given.    Oral :  None administered.    Phases: Noncontrasted imaging.    Limitations: Evaluation of the abdominal viscera and gastrointestinal tract    is limited by the lack of contrast.    Estimated radiation dose: 14.86 mSv.        COMPARISON:    No prior relevant studies are available at this time.        FINDINGS:    Lower chest: Normal.        Abdomen:    Liver:  Normal.        Spleen: Normal.        Gallbladder and biliary tree: Normal.        Pancreas: Normal.        Adrenal glands: Normal.        Urinary tract:    Kidneys: Normal.    Pelvocalyceal systems: Normal. No hydronephrosis.    Ureters: Normal. No hydroureter        Retroperitoneum: Normal.        Mesentery and gastrointestinal tract: Normal.        Pelvis:    Urinary bladder: Normal.        Inguinal regions: Normal.         Vasculature:    1. Arterial calcification without aneurysm is present.        Osseous structures:    1. Degenerative changes noted throughout the spine with levoscoliosis.        Abdominal wall: Normal.        Additional findings: None seen.        IMPRESSION:        1.  No acute intra-abdominal pathology.            This document was created using a voice recognition transcribing system.   Incorrect words or phrases may have been missed during proof reading. Please   interpret accordingly or contact the radiologist for clarification if   necessary.                DICTATING PHYSICIAN:  ELLIE RAMAN MD                   Date Dictated: 21        Signed By:  ELLIE RAMAN MD <Electronically signed by ELLIE RAMAN MD in OV>    Date Signed:  21     CC: DELMI ANTONIO MD ; DELMI ANTONIO MD      ADMITTING PHYSICIAN:                                                                                                    ORDERING PHY: DELMI ANTONIO MD                                                                                                                                                      ATTENDING PHY: DELMI ANTONIO MD    Patient Status:  REG CLI    Admit Service Date: 21       CT Chest Without Contrast                                RADIOLOGY REPORT        PT NAME: SANDOVAL GUZMÁN      [facility]     : 1932 F 89             [address]    ACCT: [id number]                                              [facility] Rec #: [id number]                                        [id number]    [facility]             Procedure: CHEST THORAX WO CONT    REQUISITION #: [id number]      REPORT #: [id number]           DATE OF EXAM: 21    TIME OF EXAM: 0900       CHEST THORAX WO CONT    CMS MANDATED QUALITY DATA CT RADIATION 436    *All CT scans at this facility uses dose modulation and/or weight-based   dosing when  appropriate to reduce radiation dose to as low as reasonably   achievable.        Clinical history:    Bladder cancer        Technique:    Acquisition: Contiguous scan slices were obtained from the apex of the lungs   through the diaphragms.    Reconstructions: Axial, coronal and sagittal. reconstructions of the data   set were obtained.    Contrast:    IV: No contrast was administered.    Other: None administered.    Phases: Noncontrasted images.    Limitations: No technical limitations noted.    Estimated radiation dose: 14.86 mSv.        Comparison:    September 16, 2021        Findings:    Lungs, pleura and large airways:    1. Several small nodules are present in the lungs most notably in the right    middle lobe region. These appear to be stable for size and configuration.   Please continue to be approximately 5-6 mm in size. These appear to be   stable when compared to the previous exam. No new lung nodules are   demonstrated.        Heart:    1 coronary artery calcifications are noted.        Systemic vasculature: Normal.        Pulmonary vasculature: Normal.        Mediastinal and hilar structures: Normal.        Chest wall, axilla and lower neck: Normal.        Upper abdomen: Please see the CT of the abdomen report        Osseous structures:    1. Degenerative changes are noted throughout the spine.        Additional findings: None seen.        Impression        1.  Multiple small pulmonary nodules remain present bilaterally. These   appear to be stable for size and number.            This document was created using a voice recognition transcribing system.   Incorrect words or phrases may have been missed during proof reading. Please   interpret accordingly or contact the radiologist for clarification if   necessary.        DICTATING PHYSICIAN:  ELLIE RAMAN MD                   Date Dictated: 12/28/21 0934        Signed By:  ELLIE RAMAN MD <Electronically signed by ELLIE RAMAN MD in OV>     Date Signed:  12/28/21 0939     CC: DELMI ANTONIO MD ; DELMI ANTONIO MD      ADMITTING PHYSICIAN:                                                                                                    ORDERING PHY: DELMI ANTONIO MD                                                                                                                                                      ATTENDING PHY: DELMI ANTONIO MD    Patient Status:  REG CLI    Admit Service Date: 12/28/21                   Assessment:     Principle Problem: No primary diagnosis found.   Co-morbidity/Active Problems:   Patient Active Problem List   Diagnosis    Malignant neoplasm of urinary bladder    Essential hypertension    Type 2 diabetes mellitus without complication, without long-term current use of insulin    Encounter for antineoplastic chemotherapy    Chemotherapy-induced nausea    Chemotherapy-induced neutropenia    Absolute anemia    Encounter for immunotherapy    Malignant neoplasm metastatic to both lungs    Elevated blood sugar        Sherice Haji is a 89 y.o. female with PMH of There were no encounter diagnoses., with No primary diagnosis found.   Sherice Haji is a 87 y.o. 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 [C67.3]   87-year-old female otherwise healthy except hypertension diabetes present with bladder mass biopsy-proven muscle invasive bladder cancer( stage II) and     biopsy-proven muscle invasive bladder cancer( stage II) recurrence  Normal renal function  Neutropenia  Status post chemo radiation therapy December 2019  recurrent muscle invasive bladder cance  3/17/21Pulm nodules noted on CT Chest     Plan:  Status post    Overall Plan:  Concurrent chemoradiation therapy, followed by optional cystectomy and full dose chemo C/G; patient does not want chemotherapy so the other option is immunotherapy  12/2/21:  Patient continues to tolerate Keytruda without any complaint.  her elevated blood sugars are being followed by her PCP   ==CT chest  scan reviewed will request comparison to CT Chest done 3/21 at [facility]  ==RTC in 3 weeks with labs   12/22/21: patient continues to tolerate tx well but some fatigue noted, did not get ct scans done, will help schedule scans prior to next appt. Labs reviewed and pt cleared for tx.  Pt recently moved into [facility] last month.   1/11/22:  Recent CT scans show stable pulmonary nodules, currently no evidence of disease in abdomen and pelvis.  Patient continues to tolerate immunotherapy very well, labs reviewed and patient will continue with current therapy as planned.  She denies any diarrhea, cough, rash or itching  2/1/22: pt doing well, no new c/o. She continues to tolerate tx without any side effects noted. Labs reviewed and pt cleared for tx as planned for Thursday. RTC in 3 weeks with labs.    -Total time spent in counseling and discussion about further management options including relevant lab work, treatment,  prognosis, medications and intended side effects was more than 25 minutes. More than 50% of the time was spent on counseling and coordination of care.  This includes face to face time and non-face to face time preparing to see the patient (eg, review of tests), Obtaining and/or reviewing separately obtained history, Documenting clinical information in the electronic or other health record, Independently interpreting resultsand communicating results to the patient/family/caregiver, or Care coordination.

## 2022-02-22 NOTE — PROGRESS NOTES
Hematology Oncology Progress Note    Hematology Oncology  [facility]     PATIENT: Sherice Haji  : 1932 89 y.o. female  MRN [id number]     PCP: Sejal Castillo MD  Consult Requested By:  No att. providers found    Date of Service: 2022    Subjective:   Interim History:  Maliganant neoplasm of urinary bladder, unspecified site    The patient doing well      Oncology History:    Sherice Haji is here for to followup for bladder cancer.  Current therapy patient doing well without new complaints she specifically denies hematuria or weight loss she has a good appetite.  This is a 87 y.o. female patient with a history of The primary encounter diagnosis was Malignant 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. Diagnoses of Essential hypertension and Type 2 diabetes mellitus without complication, without long-term current use of insulin were also pertinent to this visit., who is referred here for evaluation treatment of bladder cancer.  The patient has been feeling weak and for T for about 2 months.  Urinalysis shows hematuria or and she was referred to Urology.  CT scan with IV contrast demonstrates a bladder mass. Biopsy shows muscle invasive bladder cancer.     The patient lost about 10 lb over the past 2 months.  She denies any abdominal pain chest pain short of breath.     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 Constipation. Prior surgeries. Densely calcified coronary arteries.     ==11/2020 Keytruda restarted  ==5/2021 C scope  A scar as well as necrotic tissue identified in the left superior wall of the bladder consistent with the area of previous resection.  No evidence of recurrent disease.  Ureteral orifices of normal shape,  location and sizes effluxing clear urine.       Oncology History   Malignant neoplasm of urinary bladder   9/25/2019 Initial Diagnosis    Malignant 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     10/21/2019 Cancer Staged    Staging form: Urinary Bladder, AJCC 8th Edition  - Clinical: Stage II (cT2, cN0, cM0)     10/31/2019 - 12/11/2019 Chemotherapy    Treatment Summary   Plan Name: cisplatin taxol weekly  Treatment Goal: Curative  Status: Inactive  Start Date: 10/31/2019  End Date: 12/5/2019  Provider: Hoda Josue NP  Chemotherapy: CISplatin (PLATINOL) 20 mg/m2 = 34 mg in sodium chloride 0.9% 500 mL chemo infusion, 20 mg/m2 = 34 mg (100 % of original dose 20 mg/m2), Intravenous, Clinic/HOD 1 time, 6 of 6 cycles  Dose modification: 20 mg/m2 (original dose 20 mg/m2, Cycle 1)  PACLitaxel (TAXOL) 50 mg/m2 = 84 mg in sodium chloride 0.9% 500 mL chemo infusion, 50 mg/m2 = 84 mg (100 % of original dose 50 mg/m2), Intravenous, Clinic/HOD 1 time, 6 of 6 cycles  Dose modification: 50 mg/m2 (original dose 50 mg/m2, Cycle 1)     6/24/2020 - 6/24/2020 Chemotherapy    Treatment Summary   Plan Name: OP BLADDER GEMCITABINE CISPLATIN (SPLIT DOSE CISPLATIN) Q3W  Treatment Goal: Curative  Status: Inactive  Start Date:   End Date:   Provider: Misael Gordon MD  Chemotherapy: CISplatin (PLATINOL) 35 mg/m2 = 62 mg in sodium chloride 0.9% 500 mL chemo infusion, 35 mg/m2, Intravenous, Clinic/HOD 1 time, 0 of 4 cycles  gemcitabine (GEMZAR) 1,780 mg in sodium chloride 0.9% 250 mL chemo infusion, 1,000 mg/m2, Intravenous, Clinic/HOD 1 time, 0 of 4 cycles     11/11/2020 -   "Chemotherapy    Treatment Summary   Plan Name: OP PEMBROLIZUMAB   Treatment Goal: Control  Status: Active  Start Date: 11/11/2020  End Date: 11/24/2022 (Planned)  Provider: Misael Gordon MD  Chemotherapy: pembrolizumab (KEYTRUDA) 200 mg in sodium chloride 0.9% 100 mL chemo infusion, 200 mg, Intravenous, Clinic/HOD 1 time, 20 of 34 cycles  Dose modification: 400 mg (original dose 200 mg, Cycle 15), 200 mg (original dose 200 mg, Cycle 15), 200 mg (original dose 200 mg, Cycle 16), 200 mg (original dose 200 mg, Cycle 17)         Oncologic History:      Oncologic History     Oncologic Treatment     Pathology      Past Medical History:   Past Medical History:   Diagnosis Date    Absolute anemia 12/5/2019    Allergy     Anxiety     Arthritis     Bladder cancer     Cancer     Cataract     Removed in May 2019    Depression     Diabetes mellitus     Hypertension     Lung cancer     Malignant 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 9/25/2019    Type 2 diabetes mellitus without complication, without long-term current use of insulin 9/25/2019       Past Surgical HIstory:   Past Surgical History:   Procedure Laterality Date    BLADDER SURGERY      surgery on July 22 2020 to see if patients cancer was still present    BLADDER TUMOR EXCISION  09/2019    CATARACT EXTRACTION, BILATERAL  05/2019       Allergies:  Review of patient's allergies indicates:   Allergen Reactions    Aspirin        Medications:  Current Outpatient Medications   Medication Sig Dispense Refill    BD ALCOHOL SWABS PadM       clopidogreL (PLAVIX) 75 mg tablet       diphenoxylate-atropine 2.5-0.025 mg (LOMOTIL) 2.5-0.025 mg per tablet       DROPLET PEN NEEDLE 32 gauge x 5/32" Ndle       glimepiride (AMARYL) 2 MG tablet       HYDROcodone-acetaminophen (NORCO) 5-325 mg per tablet       hyoscyamine (LEVSIN/SL) 0.125 mg Subl       indapamide (LOZOL) 2.5 MG Tab       LANTUS SOLOSTAR U-100 INSULIN glargine 100 units/mL (3mL) SubQ pen  "      levothyroxine (SYNTHROID) 75 MCG tablet       loratadine (CLARITIN) 10 mg tablet       losartan (COZAAR) 100 MG tablet Take 1 tablet by mouth once daily.      meclizine (ANTIVERT) 12.5 mg tablet       meloxicam (MOBIC) 7.5 MG tablet       metFORMIN (GLUCOPHAGE-XR) 500 MG 24 hr tablet       metoprolol tartrate (LOPRESSOR) 50 MG tablet       simvastatin (ZOCOR) 20 MG tablet       traZODone (DESYREL) 50 MG tablet        No current facility-administered medications for this visit.       Family History:   Family History   Problem Relation Age of Onset    No Known Problems Mother     No Known Problems Father     Breast cancer Sister     Prostate cancer Brother     No Known Problems Maternal Aunt     No Known Problems Maternal Uncle     No Known Problems Paternal Aunt     No Known Problems Paternal Uncle     No Known Problems Maternal Grandmother     No Known Problems Maternal Grandfather     No Known Problems Paternal Grandmother     No Known Problems Paternal Grandfather     Breast cancer Sister     Prostate cancer Brother        Social History:  reports that she has never smoked. She has never used smokeless tobacco. She reports previous alcohol use. She reports that she does not use drugs.    Review of Systemas  Constitutional: No change in weight, appetie, fatigue, fever, or night sweats  Eyes: No changes in vision  Ears, Nose, Mouth, Throat, and Face: No hearing problems, ear pain, rummy nose, or sore throat  Respiratory: No shortness of breath or cough  Cardiovascular: No chest pain, palpitations or dizziness  Gastrointestinal: No abdominal pain, hematochezia, melena  Genitourinary: No dysuria, hematuria, nocturia, menstrual problems, post menopausal  Integumentary/Breast: No rashes or itching  Hematologic/Lymphatic: No anemia or bleeding abnormalities  Musculoskeletal: No joint or muscle abnormalities  Neurological: No sensory or motor problems, no headaches  Behavioral/Psych: No  "depression, anxiety, cognitive problems, or stress  Endocrine: No diabetes or thyroid problems  Allergic/Immunologic: See allergy above    Physical Exam      Vitals:   Vitals:    02/22/22 0957   BP: (!) 191/107   BP Location: Right arm   Patient Position: Sitting   BP Method: Medium (Automatic)   Pulse: 80   Resp: 16   Temp: 96.5 °F (35.8 °C)   TempSrc: Temporal   SpO2: 95%   Weight: 71.7 kg (158 lb)   Height: 5' 3" (1.6 m)     BMI: Body mass index is 27.99 kg/m².  BSA Body surface area is 1.79 meters squared.  ECOG Performance Status:   ECOG SCORE        ECOG 1    GENERAL APPEARANCE: Well developed, well nourished, in no acute distress.  SKIN: Inspection of the skin reveals no rashes, ulcerations or petechiae.  HEENT: The sclerae were anicteric and conjunctivae were pink and moist. Extraocular movements were intact and pupils were equal, round, and reactive to light with normal accommodation. External inspection of the ears and nose showed no scars, lesions, or masses. Lips, teeth, and gums showed normal mucosa. The oral mucosa, hard and soft palate, tongue and posterior pharynx were normal.  NECK: Supple and symmetric. There was no thyroid enlargement, and no tenderness, or masses were felt.  CRESPIRATORY: Normal AP diameter and normal contour without any kyphoscoliosis. LUNGS: Auscultation of the lungs revealed normal breath sounds without any other adventitious sounds or rubs.  CARDIOVASCULAR: There was a regular rate and rhythm without any murmurs, gallops, rubs. The carotid pulses were normal and 2+ bilaterally without bruits. Peripheral pulses were 2+ and symmetric.  GASTROINTESTINAL: Soft and nontender with normal bowel sounds. The liver span was approximately 5-6 cm in the right midclavicular line by percussion. The liver edge was nontender. The spleen was not palpable. There were no inguinal or umbilical hernias noted. No ascites was noted.  LYMPH NODES: No lymphadenopathy was appreciated in the neck, " axillae or groin.  MUSCULOSKELETAL: Gait was normal. There was no tenderness or effusions noted. Muscle strength and tone were normal. EXTREMITIES: No cyanosis, clubbing or edema.  NEUROLOGIC: Alert and oriented x 3. Normal affect. Gait was normal. Normal deep tendon reflexes with no pathological reflexes. Sensation to touch was normal.  PSYCHIATRIC: good mood, orientated to place, time and person     Labs and Imagings     Lab Results   Component Value Date    WBC 3.4 (L) 02/22/2022    HGB 11.6 (L) 02/22/2022    HCT 36.5 (L) 02/22/2022    MCV 89.7 02/22/2022    LABPLAT 226 02/22/2022       CMP  Sodium   Date Value Ref Range Status   02/22/2022 138 135 - 145 mmol/L Final   10/14/2019 133 (L) 136 - 145 mmol/L Final     Potassium   Date Value Ref Range Status   02/22/2022 3.9 3.6 - 5.2 mmol/L Final   10/14/2019 4.4 3.5 - 5.1 mmol/L Final     Chloride   Date Value Ref Range Status   02/22/2022 100 100 - 108 mmol/L Final   10/14/2019 97 (L) 98 - 107 mmol/L Final     CO2   Date Value Ref Range Status   02/22/2022 34 (H) 21 - 32 mmol/L Final   10/14/2019 28 22 - 29 mmol/L Final     Glucose   Date Value Ref Range Status   02/22/2022 251 (H) 70 - 110 mg/dL Final     BUN   Date Value Ref Range Status   02/22/2022 19 (H) 7 - 18 mg/dL Final   10/14/2019 15.8 8 - 23 mg/dL Final     Creatinine   Date Value Ref Range Status   02/22/2022 1.12 (H) 0.55 - 1.02 mg/dL Final   10/14/2019 0.74 0.50 - 0.90 mg/dL Final     Calcium   Date Value Ref Range Status   02/22/2022 9.4 8.8 - 10.5 mg/dL Final   10/14/2019 9.9 8.6 - 10.2 mg/dL Final     Total Protein   Date Value Ref Range Status   02/22/2022 6.9 6.4 - 8.2 g/dL Final     Albumin   Date Value Ref Range Status   02/22/2022 3.6 3.4 - 5.0 g/dL Final   10/14/2019 4.3 3.5 - 5.2 g/dL Final     Total Bilirubin   Date Value Ref Range Status   02/22/2022 0.2 0.0 - 1.0 mg/dL Final     Alkaline Phosphatase   Date Value Ref Range Status   02/22/2022 63 46 - 116 U/L Final     AST   Date Value Ref  Range Status   2022 14 (L) 15 - 37 U/L Final   10/14/2019 11 0 - 32 U/L Final     ALT   Date Value Ref Range Status   2022 11 (L) 12 - 78 U/L Final     Anion Gap   Date Value Ref Range Status   2022 4.0 3.0 - 11.0 mmol/L Final   10/14/2019 8.0 8.0 - 17.0 mmol/L Final     Comment:     NOTE  Testing performed at:  [facility], [address] [place] [address]  [address] CLIA #:[id number]         All cell laboratory reviewed which shows white cell 1.7 and sodium 126  Imaging: CT C/A/P 21:  CT Abdomen Pelvis  Without Contrast                                RADIOLOGY REPORT        PT NAME: SANDOVAL GUZMÁN      [facility]     : 1932 F 89             [address]    ACCT: [id number]                                              [place], [place]    Med Rec #: [id number]                                        [id number]    [facility]             Procedure: CT ABD   PELVIS WO CONTRAST    REQUISITION #: [id number]      REPORT #: [id number]           DATE OF EXAM: 21    TIME OF EXAM: 0845       CT ABD   PELVIS WO CONTRAST    CMS MANDATED QUALITY DATA CT RADIATION 436    *All CT scans at this facility uses dose modulation and/or weight-based   dosing when appropriate to reduce radiation dose to as low as reasonably   achievable.            CLINICAL HISTORY:    History of bladder cancer        TECHNIQUE:    Acquisition: Contiguous scan slices were obtained from the top of the   hemidiaphragm through the pelvis.    Reconstructions: Axial, coronal and sagittal reconstructions.         Contrast:    IV : None given.    Oral :  None administered.    Phases: Noncontrasted imaging.    Limitations: Evaluation of the abdominal viscera and gastrointestinal tract    is limited by the lack of contrast.    Estimated radiation dose: 14.86 mSv.        COMPARISON:    No prior relevant studies are available at this time.        FINDINGS:    Lower chest:  Normal.        Abdomen:    Liver:  Normal.        Spleen: Normal.        Gallbladder and biliary tree: Normal.        Pancreas: Normal.        Adrenal glands: Normal.        Urinary tract:    Kidneys: Normal.    Pelvocalyceal systems: Normal. No hydronephrosis.    Ureters: Normal. No hydroureter        Retroperitoneum: Normal.        Mesentery and gastrointestinal tract: Normal.        Pelvis:    Urinary bladder: Normal.        Inguinal regions: Normal.        Vasculature:    1. Arterial calcification without aneurysm is present.        Osseous structures:    1. Degenerative changes noted throughout the spine with levoscoliosis.        Abdominal wall: Normal.        Additional findings: None seen.        IMPRESSION:        1.  No acute intra-abdominal pathology.            This document was created using a voice recognition transcribing system.   Incorrect words or phrases may have been missed during proof reading. Please   interpret accordingly or contact the radiologist for clarification if   necessary.                DICTATING PHYSICIAN:  ELLIE RAMAN MD                   Date Dictated: 21        Signed By:  ELLIE RAMAN MD <Electronically signed by ELLIE RAMAN MD in OV>    Date Signed:  2185     CC: DELMI ANTONIO MD ; DELMI ANTONIO MD      ADMITTING PHYSICIAN:                                                                                                    ORDERING PHY: DELMI ANTONIO MD                                                                                                                                                      ATTENDING PHY: DELMI ANTONIO MD    Patient Status:  REG CLI    Admit Service Date: 21       CT Chest Without Contrast                                RADIOLOGY REPORT        PT NAME: SANDOVAL GUZMÁN      [facility] IMAG[facility]     : 1932 F 89             [address]    ACCT: [id number]                                               [place], [place]    [facility] Rec #: [id number]                                        [id number]    [facility]             Procedure: CHEST THORAX WO CONT    REQUISITION #: [id number]      REPORT #: [id number]           DATE OF EXAM: 12/28/21    TIME OF EXAM: 0900       CHEST THORAX WO CONT    CMS MANDATED QUALITY DATA CT RADIATION 436    *All CT scans at this facility uses dose modulation and/or weight-based   dosing when appropriate to reduce radiation dose to as low as reasonably   achievable.        Clinical history:    Bladder cancer        Technique:    Acquisition: Contiguous scan slices were obtained from the apex of the lungs   through the diaphragms.    Reconstructions: Axial, coronal and sagittal. reconstructions of the data   set were obtained.    Contrast:    IV: No contrast was administered.    Other: None administered.    Phases: Noncontrasted images.    Limitations: No technical limitations noted.    Estimated radiation dose: 14.86 mSv.        Comparison:    September 16, 2021        Findings:    Lungs, pleura and large airways:    1. Several small nodules are present in the lungs most notably in the right    middle lobe region. These appear to be stable for size and configuration.   Please continue to be approximately 5-6 mm in size. These appear to be   stable when compared to the previous exam. No new lung nodules are   demonstrated.        Heart:    1 coronary artery calcifications are noted.        Systemic vasculature: Normal.        Pulmonary vasculature: Normal.        Mediastinal and hilar structures: Normal.        Chest wall, axilla and lower neck: Normal.        Upper abdomen: Please see the CT of the abdomen report        Osseous structures:    1. Degenerative changes are noted throughout the spine.        Additional findings: None seen.        Impression        1.  Multiple small pulmonary nodules remain present bilaterally. These   appear to be stable for  size and number.            This document was created using a voice recognition transcribing system.   Incorrect words or phrases may have been missed during proof reading. Please   interpret accordingly or contact the radiologist for clarification if   necessary.        DICTATING PHYSICIAN:  ELLIE RAMAN MD                   Date Dictated: 12/28/21 0934        Signed By:  ELLIE RAMAN MD <Electronically signed by ELLIE RAMAN MD in OV>    Date Signed:  12/28/21 0939     CC: DELMI ANTONIO MD ; DELMI ANTONIO MD      ADMITTING PHYSICIAN:                                                                                                    ORDERING PHY: DELMI ANTONIO MD                                                                                                                                                      ATTENDING PHY: DELMI ANTONIO MD    Patient Status:  REG CLI    Admit Service Date: 12/28/21                   Assessment:     Principle Problem: Malignant neoplasm of urinary bladder, unspecified site [C67.9]   Co-morbidity/Active Problems:   Patient Active Problem List   Diagnosis    Malignant neoplasm of urinary bladder    Essential hypertension    Type 2 diabetes mellitus without complication, without long-term current use of insulin    Encounter for antineoplastic chemotherapy    Chemotherapy-induced nausea    Chemotherapy-induced neutropenia    Absolute anemia    Encounter for immunotherapy    Malignant neoplasm metastatic to both lungs    Elevated blood sugar        Sherice Haji is a 89 y.o. female with PMH of The primary encounter diagnosis was Malignant neoplasm of urinary bladder, unspecified site. A diagnosis of Hypothyroidism, unspecified type was also pertinent to this visit., with Malignant neoplasm of urinary bladder, unspecified site [C67.9]   Sherice Haji is a 87 y.o. female with PMH of The primary encounter diagnosis was Malignant neoplasm of anterior  wall of urinary bladder. 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 [C67.3]   87-year-old female otherwise healthy except hypertension diabetes present with bladder mass biopsy-proven muscle invasive bladder cancer( stage II) and     biopsy-proven muscle invasive bladder cancer( stage II) recurrence  Normal renal function  Neutropenia  Status post chemo radiation therapy 2019  recurrent muscle invasive bladder cance  3/17/21Pulm nodules noted on CT Chest     Plan:  Status post   Overall Plan:  Concurrent chemoradiation therapy, followed by optional cystectomy and full dose chemo C/G; patient does not want chemotherapy so the other option is immunotherapy  21:  Patient continues to tolerate Keytruda without any complaint.  her elevated blood sugars are being followed by her PCP   ==CT chest  scan reviewed will request comparison to CT Chest done 3/21 at [facility]  ==RTC in 3 weeks with labs   21: patient continues to tolerate tx well but some fatigue noted, did not get ct scans done, will help schedule scans prior to next appt. Labs reviewed and pt cleared for tx.  Pt recently [facility] into [facility] last month.   22:  Recent CT scans show stable pulmonary nodules, currently no evidence of disease in abdomen and pelvis.  Patient continues to tolerate immunotherapy very well, labs reviewed and patient will continue with current therapy as planned.  She denies any diarrhea, cough, rash or itching                                                        Hematology Oncology Progress Note    Hematology Oncology  [facility]     PATIENT: Sherice Haji  : 1932 89 y.o. female  MRN [id number]     PCP: Sejal Castillo MD  Consult Requested By:  No att. providers found    Date of Service:  2/22/2022    Subjective:   Interim History:  Maliganant neoplasm of urinary bladder, unspecified site    The patient doing well      Oncology History:    Sherice Haji is here for to followup for bladder cancer.  Current therapy patient doing well without new complaints she specifically denies hematuria or weight loss she has a good appetite.  This is a 87 y.o. female patient with a history of The primary encounter diagnosis was Malignant 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. Diagnoses of Essential hypertension and Type 2 diabetes mellitus without complication, without long-term current use of insulin were also pertinent to this visit., who is referred here for evaluation treatment of bladder cancer.  The patient has been feeling weak and for T for about 2 months.  Urinalysis shows hematuria or and she was referred to Urology.  CT scan with IV contrast demonstrates a bladder mass. Biopsy shows muscle invasive bladder cancer.     The patient lost about 10 lb over the past 2 months.  She denies any abdominal pain chest pain short of breath.     ==12/2019 completed concurrent chemoradiation therapy as a bladder preservation  ==4/2020 Cyst NEO in bladder  ==4/2020 discussed a chemotherapy options such as cisplatin gemcitabine as a 20 % dose reduction; she declined chemotherapy  ==7/2020 recurrence muscle invasive bladder cancer on cystoscope  ==8/2020 we discussed options for her; for those chemotherapy with cisplatin gemcitabine versus immunotherapy with ago to induce local response and hopefully bladder preservation as a salvage; she prefer immunotherapy  == she was displaced by JANIE Aparicio  ==11/30/20  CT There is some wall thickening along the left anterior aspect of the bladder unchanged. Constipation. Prior surgeries. Densely calcified coronary arteries.     ==11/2020 Keytruda restarted  ==5/2021 C scope  A scar as well as necrotic tissue identified in the left superior wall of the bladder consistent  with the area of previous resection.  No evidence of recurrent disease.  Ureteral orifices of normal shape,  location and sizes effluxing clear urine.       Oncology History   Malignant neoplasm of urinary bladder   9/25/2019 Initial Diagnosis    Malignant 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     10/21/2019 Cancer Staged    Staging form: Urinary Bladder, AJCC 8th Edition  - Clinical: Stage II (cT2, cN0, cM0)     10/31/2019 - 12/11/2019 Chemotherapy    Treatment Summary   Plan Name: cisplatin taxol weekly  Treatment Goal: Curative  Status: Inactive  Start Date: 10/31/2019  End Date: 12/5/2019  Provider: Hoda Josue NP  Chemotherapy: CISplatin (PLATINOL) 20 mg/m2 = 34 mg in sodium chloride 0.9% 500 mL chemo infusion, 20 mg/m2 = 34 mg (100 % of original dose 20 mg/m2), Intravenous, Clinic/HOD 1 time, 6 of 6 cycles  Dose modification: 20 mg/m2 (original dose 20 mg/m2, Cycle 1)  PACLitaxel (TAXOL) 50 mg/m2 = 84 mg in sodium chloride 0.9% 500 mL chemo infusion, 50 mg/m2 = 84 mg (100 % of original dose 50 mg/m2), Intravenous, Clinic/HOD 1 time, 6 of 6 cycles  Dose modification: 50 mg/m2 (original dose 50 mg/m2, Cycle 1)     6/24/2020 - 6/24/2020 Chemotherapy    Treatment Summary   Plan Name: OP BLADDER GEMCITABINE CISPLATIN (SPLIT DOSE CISPLATIN) Q3W  Treatment Goal: Curative  Status: Inactive  Start Date:   End Date:   Provider: Misael Gordon MD  Chemotherapy: CISplatin (PLATINOL) 35 mg/m2 = 62 mg in sodium chloride 0.9% 500 mL chemo infusion, 35 mg/m2, Intravenous, Clinic/HOD 1 time, 0 of 4 cycles  gemcitabine (GEMZAR) 1,780 mg in sodium chloride 0.9% 250 mL chemo infusion, 1,000 mg/m2, Intravenous, Clinic/HOD 1 time, 0 of 4 cycles     11/11/2020 -  Chemotherapy    Treatment Summary   Plan Name: OP PEMBROLIZUMAB   Treatment Goal: Control  Status: Active  Start Date: 11/11/2020  End Date: 11/24/2022 (Planned)  Provider: Misael Gordon MD  Chemotherapy: pembrolizumab (KEYTRUDA) 200 mg in sodium chloride 0.9% 100  "mL chemo infusion, 200 mg, Intravenous, Clinic/HOD 1 time, 20 of 34 cycles  Dose modification: 400 mg (original dose 200 mg, Cycle 15), 200 mg (original dose 200 mg, Cycle 15), 200 mg (original dose 200 mg, Cycle 16), 200 mg (original dose 200 mg, Cycle 17)         Oncologic History:      Oncologic History     Oncologic Treatment     Pathology      Past Medical History:   Past Medical History:   Diagnosis Date    Absolute anemia 12/5/2019    Allergy     Anxiety     Arthritis     Bladder cancer     Cancer     Cataract     Removed in May 2019    Depression     Diabetes mellitus     Hypertension     Lung cancer     Malignant 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 9/25/2019    Type 2 diabetes mellitus without complication, without long-term current use of insulin 9/25/2019       Past Surgical HIstory:   Past Surgical History:   Procedure Laterality Date    BLADDER SURGERY      surgery on July 22 2020 to see if patients cancer was still present    BLADDER TUMOR EXCISION  09/2019    CATARACT EXTRACTION, BILATERAL  05/2019       Allergies:  Review of patient's allergies indicates:   Allergen Reactions    Aspirin        Medications:  Current Outpatient Medications   Medication Sig Dispense Refill    BD ALCOHOL SWABS PadM       clopidogreL (PLAVIX) 75 mg tablet       diphenoxylate-atropine 2.5-0.025 mg (LOMOTIL) 2.5-0.025 mg per tablet       DROPLET PEN NEEDLE 32 gauge x 5/32" Ndle       glimepiride (AMARYL) 2 MG tablet       HYDROcodone-acetaminophen (NORCO) 5-325 mg per tablet       hyoscyamine (LEVSIN/SL) 0.125 mg Subl       indapamide (LOZOL) 2.5 MG Tab       LANTUS SOLOSTAR U-100 INSULIN glargine 100 units/mL (3mL) SubQ pen       levothyroxine (SYNTHROID) 75 MCG tablet       loratadine (CLARITIN) 10 mg tablet       losartan (COZAAR) 100 MG tablet Take 1 tablet by mouth once daily.      meclizine (ANTIVERT) 12.5 mg tablet       meloxicam (MOBIC) 7.5 MG tablet       metFORMIN " (GLUCOPHAGE-XR) 500 MG 24 hr tablet       metoprolol tartrate (LOPRESSOR) 50 MG tablet       simvastatin (ZOCOR) 20 MG tablet       traZODone (DESYREL) 50 MG tablet        No current facility-administered medications for this visit.       Family History:   Family History   Problem Relation Age of Onset    No Known Problems Mother     No Known Problems Father     Breast cancer Sister     Prostate cancer Brother     No Known Problems Maternal Aunt     No Known Problems Maternal Uncle     No Known Problems Paternal Aunt     No Known Problems Paternal Uncle     No Known Problems Maternal Grandmother     No Known Problems Maternal Grandfather     No Known Problems Paternal Grandmother     No Known Problems Paternal Grandfather     Breast cancer Sister     Prostate cancer Brother        Social History:  reports that she has never smoked. She has never used smokeless tobacco. She reports previous alcohol use. She reports that she does not use drugs.    Review of Systemas  Constitutional: No change in weight, appetie, fatigue, fever, or night sweats  Eyes: No changes in vision  Ears, Nose, Mouth, Throat, and Face: No hearing problems, ear pain, rummy nose, or sore throat  Respiratory: No shortness of breath or cough  Cardiovascular: No chest pain, palpitations or dizziness  Gastrointestinal: No abdominal pain, hematochezia, melena  Genitourinary: No dysuria, hematuria, nocturia, menstrual problems, post menopausal  Integumentary/Breast: No rashes or itching  Hematologic/Lymphatic: No anemia or bleeding abnormalities  Musculoskeletal: No joint or muscle abnormalities  Neurological: No sensory or motor problems, no headaches  Behavioral/Psych: No depression, anxiety, cognitive problems, or stress  Endocrine: No diabetes or thyroid problems  Allergic/Immunologic: See allergy above    Physical Exam      Vitals:   Vitals:    02/22/22 0957   BP: (!) 191/107   BP Location: Right arm   Patient Position: Sitting  "  BP Method: Medium (Automatic)   Pulse: 80   Resp: 16   Temp: 96.5 °F (35.8 °C)   TempSrc: Temporal   SpO2: 95%   Weight: 71.7 kg (158 lb)   Height: 5' 3" (1.6 m)     BMI: Body mass index is 27.99 kg/m².  BSA Body surface area is 1.79 meters squared.  ECOG Performance Status:   ECOG SCORE        ECOG 1    GENERAL APPEARANCE: Well developed, well nourished, in no acute distress.  SKIN: Inspection of the skin reveals no rashes, ulcerations or petechiae.  HEENT: The sclerae were anicteric and conjunctivae were pink and moist. Extraocular movements were intact and pupils were equal, round, and reactive to light with normal accommodation. External inspection of the ears and nose showed no scars, lesions, or masses. Lips, teeth, and gums showed normal mucosa. The oral mucosa, hard and soft palate, tongue and posterior pharynx were normal.  NECK: Supple and symmetric. There was no thyroid enlargement, and no tenderness, or masses were felt.  CRESPIRATORY: Normal AP diameter and normal contour without any kyphoscoliosis. LUNGS: Auscultation of the lungs revealed normal breath sounds without any other adventitious sounds or rubs.  CARDIOVASCULAR: There was a regular rate and rhythm without any murmurs, gallops, rubs. The carotid pulses were normal and 2+ bilaterally without bruits. Peripheral pulses were 2+ and symmetric.  GASTROINTESTINAL: Soft and nontender with normal bowel sounds. The liver span was approximately 5-6 cm in the right midclavicular line by percussion. The liver edge was nontender. The spleen was not palpable. There were no inguinal or umbilical hernias noted. No ascites was noted.  LYMPH NODES: No lymphadenopathy was appreciated in the neck, axillae or groin.  MUSCULOSKELETAL: Gait was normal. There was no tenderness or effusions noted. Muscle strength and tone were normal. EXTREMITIES: No cyanosis, clubbing or edema.  NEUROLOGIC: Alert and oriented x 3. Normal affect. Gait was normal. Normal deep tendon " reflexes with no pathological reflexes. Sensation to touch was normal.  PSYCHIATRIC: good mood, orientated to place, time and person     Labs and Imagings     Lab Results   Component Value Date    WBC 3.4 (L) 02/22/2022    HGB 11.6 (L) 02/22/2022    HCT 36.5 (L) 02/22/2022    MCV 89.7 02/22/2022    LABPLAT 226 02/22/2022       CMP  Sodium   Date Value Ref Range Status   02/22/2022 138 135 - 145 mmol/L Final   10/14/2019 133 (L) 136 - 145 mmol/L Final     Potassium   Date Value Ref Range Status   02/22/2022 3.9 3.6 - 5.2 mmol/L Final   10/14/2019 4.4 3.5 - 5.1 mmol/L Final     Chloride   Date Value Ref Range Status   02/22/2022 100 100 - 108 mmol/L Final   10/14/2019 97 (L) 98 - 107 mmol/L Final     CO2   Date Value Ref Range Status   02/22/2022 34 (H) 21 - 32 mmol/L Final   10/14/2019 28 22 - 29 mmol/L Final     Glucose   Date Value Ref Range Status   02/22/2022 251 (H) 70 - 110 mg/dL Final     BUN   Date Value Ref Range Status   02/22/2022 19 (H) 7 - 18 mg/dL Final   10/14/2019 15.8 8 - 23 mg/dL Final     Creatinine   Date Value Ref Range Status   02/22/2022 1.12 (H) 0.55 - 1.02 mg/dL Final   10/14/2019 0.74 0.50 - 0.90 mg/dL Final     Calcium   Date Value Ref Range Status   02/22/2022 9.4 8.8 - 10.5 mg/dL Final   10/14/2019 9.9 8.6 - 10.2 mg/dL Final     Total Protein   Date Value Ref Range Status   02/22/2022 6.9 6.4 - 8.2 g/dL Final     Albumin   Date Value Ref Range Status   02/22/2022 3.6 3.4 - 5.0 g/dL Final   10/14/2019 4.3 3.5 - 5.2 g/dL Final     Total Bilirubin   Date Value Ref Range Status   02/22/2022 0.2 0.0 - 1.0 mg/dL Final     Alkaline Phosphatase   Date Value Ref Range Status   02/22/2022 63 46 - 116 U/L Final     AST   Date Value Ref Range Status   02/22/2022 14 (L) 15 - 37 U/L Final   10/14/2019 11 0 - 32 U/L Final     ALT   Date Value Ref Range Status   02/22/2022 11 (L) 12 - 78 U/L Final     Anion Gap   Date Value Ref Range Status   02/22/2022 4.0 3.0 - 11.0 mmol/L Final   10/14/2019 8.0 8.0 -  17.0 mmol/L Final     Comment:     NOTE  Testing performed at:  [facility], [address] [place]  [address] CLIA #:[id number]         All cell laboratory reviewed which shows white cell 1.7 and sodium 126  Imaging: CT C/A/P 21:  CT Abdomen Pelvis  Without Contrast                                RADIOLOGY REPORT        PT NAME: SANDOVAL GUZMÁN      [facility]     : 1932 F 89             [address]    ACCT: [id number]                                              [place], [place]    Med Rec #: [id number]                                        [id number]    [facility]             Procedure: CT ABD   PELVIS WO CONTRAST    REQUISITION #: [id number]      REPORT #: [id number]           DATE OF EXAM: 21    TIME OF EXAM: 845       CT ABD   PELVIS WO CONTRAST    CMS MANDATED QUALITY DATA CT RADIATION 436    *All CT scans at this facility uses dose modulation and/or weight-based   dosing when appropriate to reduce radiation dose to as low as reasonably   achievable.            CLINICAL HISTORY:    History of bladder cancer        TECHNIQUE:    Acquisition: Contiguous scan slices were obtained from the top of the   hemidiaphragm through the pelvis.    Reconstructions: Axial, coronal and sagittal reconstructions.         Contrast:    IV : None given.    Oral :  None administered.    Phases: Noncontrasted imaging.    Limitations: Evaluation of the abdominal viscera and gastrointestinal tract    is limited by the lack of contrast.    Estimated radiation dose: 14.86 mSv.        COMPARISON:    No prior relevant studies are available at this time.        FINDINGS:    Lower chest: Normal.        Abdomen:    Liver:  Normal.        Spleen: Normal.        Gallbladder and biliary tree: Normal.        Pancreas: Normal.        Adrenal glands: Normal.        Urinary tract:    Kidneys: Normal.    Pelvocalyceal systems: Normal. No hydronephrosis.     Ureters: Normal. No hydroureter        Retroperitoneum: Normal.        Mesentery and gastrointestinal tract: Normal.        Pelvis:    Urinary bladder: Normal.        Inguinal regions: Normal.        Vasculature:    1. Arterial calcification without aneurysm is present.        Osseous structures:    1. Degenerative changes noted throughout the spine with levoscoliosis.        Abdominal wall: Normal.        Additional findings: None seen.        IMPRESSION:        1.  No acute intra-abdominal pathology.            This document was created using a voice recognition transcribing system.   Incorrect words or phrases may have been missed during proof reading. Please   interpret accordingly or contact the radiologist for clarification if   necessary.                DICTATING PHYSICIAN:  ELLIE RAMAN MD                   Date Dictated: 21        Signed By:  ELLIE RAMAN MD <Electronically signed by ELLIE RAMAN MD in OV>    Date Signed:  21     CC: DELMI ANTONIO MD ; DELMI ANTONIO MD      ADMITTING PHYSICIAN:                                                                                                    ORDERING PHY: DELMI ANTONIO MD                                                                                                                                                      ATTENDING PHY: DELMI ANTONIO MD    Patient Status:  REG CLI    Admit Service Date: 21       CT Chest Without Contrast                                RADIOLOGY REPORT        PT NAME: SANDOVAL GUZMÁN      [facility]     : 1932 F 89             [address]    ACCT: [id number]                                              [facility] Rec #: [id number]                                        [id number]    [facility]             Procedure: CHEST THORAX WO CONT    REQUISITION #: [id number]      REPORT #: [id number]           DATE OF EXAM:  12/28/21    TIME OF EXAM: 0900       CHEST THORAX WO CONT    CMS MANDATED QUALITY DATA CT RADIATION 436    *All CT scans at this facility uses dose modulation and/or weight-based   dosing when appropriate to reduce radiation dose to as low as reasonably   achievable.        Clinical history:    Bladder cancer        Technique:    Acquisition: Contiguous scan slices were obtained from the apex of the lungs   through the diaphragms.    Reconstructions: Axial, coronal and sagittal. reconstructions of the data   set were obtained.    Contrast:    IV: No contrast was administered.    Other: None administered.    Phases: Noncontrasted images.    Limitations: No technical limitations noted.    Estimated radiation dose: 14.86 mSv.        Comparison:    September 16, 2021        Findings:    Lungs, pleura and large airways:    1. Several small nodules are present in the lungs most notably in the right    middle lobe region. These appear to be stable for size and configuration.   Please continue to be approximately 5-6 mm in size. These appear to be   stable when compared to the previous exam. No new lung nodules are   demonstrated.        Heart:    1 coronary artery calcifications are noted.        Systemic vasculature: Normal.        Pulmonary vasculature: Normal.        Mediastinal and hilar structures: Normal.        Chest wall, axilla and lower neck: Normal.        Upper abdomen: Please see the CT of the abdomen report        Osseous structures:    1. Degenerative changes are noted throughout the spine.        Additional findings: None seen.        Impression        1.  Multiple small pulmonary nodules remain present bilaterally. These   appear to be stable for size and number.            This document was created using a voice recognition transcribing system.   Incorrect words or phrases may have been missed during proof reading. Please   interpret accordingly or contact the radiologist for clarification if   necessary.         DICTATING PHYSICIAN:  ELLIE RAMAN MD                   Date Dictated: 12/28/21 0934        Signed By:  ELLIE RAMAN MD <Electronically signed by ELLIE RAMAN MD in OV>    Date Signed:  12/28/21 0939     CC: DELMI ANTONIO MD ; DELMI ANTONIO MD      ADMITTING PHYSICIAN:                                                                                                    ORDERING PHY: DELMI ANTONIO MD                                                                                                                                                      ATTENDING PHY: DELMI ANTONIO MD    Patient Status:  REG CLI    Admit Service Date: 12/28/21                   Assessment:     Principle Problem: Malignant neoplasm of urinary bladder, unspecified site [C67.9]   Co-morbidity/Active Problems:   Patient Active Problem List   Diagnosis    Malignant neoplasm of urinary bladder    Essential hypertension    Type 2 diabetes mellitus without complication, without long-term current use of insulin    Encounter for antineoplastic chemotherapy    Chemotherapy-induced nausea    Chemotherapy-induced neutropenia    Absolute anemia    Encounter for immunotherapy    Malignant neoplasm metastatic to both lungs    Elevated blood sugar        Sherice Haji is a 89 y.o. female with PMH of The primary encounter diagnosis was Malignant neoplasm of urinary bladder, unspecified site. A diagnosis of Hypothyroidism, unspecified type was also pertinent to this visit., with Malignant neoplasm of urinary bladder, unspecified site [C67.9]   Sherice Haji is a 87 y.o. 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 [C67.3]    87-year-old female otherwise healthy except hypertension diabetes present with bladder mass biopsy-proven muscle invasive bladder cancer( stage II) and     biopsy-proven muscle invasive bladder cancer( stage II) recurrence  Normal renal function  Neutropenia  Status post chemo radiation therapy December 2019  recurrent muscle invasive bladder cance  3/17/21Pulm nodules noted on CT Chest     Plan:  Status post   Overall Plan:  Concurrent chemoradiation therapy, followed by optional cystectomy and full dose chemo C/G; patient does not want chemotherapy so the other option is immunotherapy  12/2/21:  Patient continues to tolerate Keytruda without any complaint.  her elevated blood sugars are being followed by her PCP   ==CT chest  scan reviewed will request comparison to CT Chest [facility] 3/21 at [facility]  ==RTC in 3 weeks with labs   12/22/21: patient continues to tolerate tx well but some fatigue noted, did not get ct scans done, will help schedule scans prior to next appt. Labs reviewed and pt cleared for tx.  Pt recently [facility] into [facility] last month.   1/11/22:  Recent CT scans show stable pulmonary nodules, currently no evidence of disease in abdomen and pelvis.  Patient continues to tolerate immunotherapy very well, labs reviewed and patient will continue with current therapy as planned.  She denies any diarrhea, cough, rash or itching  2/22/22: pt doing well, no new c/o. She continues to tolerate tx without any side effects noted. Labs reviewed and pt cleared for tx as planned for Thursday. RTC in 3 weeks with labs.    -Total time spent in counseling and discussion about further management options including relevant lab work, treatment,  prognosis, medications and intended side effects was more than 25 minutes. More than 50% of the time was spent on counseling and coordination of care.  This includes face to face time and non-face to face time preparing to see the patient (eg, review of tests),  Obtaining and/or reviewing separately obtained history, Documenting clinical information in the electronic or other health record, Independently interpreting resultsand communicating results to the patient/family/caregiver, or Care coordination.

## 2022-03-03 DIAGNOSIS — E03.9 HYPOTHYROIDISM, UNSPECIFIED TYPE: ICD-10-CM

## 2022-03-03 DIAGNOSIS — C67.9 MALIGNANT NEOPLASM OF URINARY BLADDER, UNSPECIFIED SITE: Primary | ICD-10-CM

## 2022-03-15 ENCOUNTER — CLINICAL SUPPORT (OUTPATIENT)
Dept: HEMATOLOGY/ONCOLOGY | Facility: CLINIC | Age: 87
End: 2022-03-15
Payer: COMMERCIAL

## 2022-03-15 ENCOUNTER — OFFICE VISIT (OUTPATIENT)
Dept: HEMATOLOGY/ONCOLOGY | Facility: CLINIC | Age: 87
End: 2022-03-15
Payer: COMMERCIAL

## 2022-03-15 VITALS
HEART RATE: 85 BPM | RESPIRATION RATE: 16 BRPM | DIASTOLIC BLOOD PRESSURE: 94 MMHG | SYSTOLIC BLOOD PRESSURE: 196 MMHG | BODY MASS INDEX: 28.73 KG/M2 | HEIGHT: 63 IN | OXYGEN SATURATION: 95 % | WEIGHT: 162.13 LBS

## 2022-03-15 DIAGNOSIS — C67.9 MALIGNANT NEOPLASM OF URINARY BLADDER, UNSPECIFIED SITE: ICD-10-CM

## 2022-03-15 DIAGNOSIS — E03.9 HYPOTHYROIDISM, UNSPECIFIED TYPE: ICD-10-CM

## 2022-03-15 DIAGNOSIS — C67.9 MALIGNANT NEOPLASM OF URINARY BLADDER, UNSPECIFIED SITE: Primary | ICD-10-CM

## 2022-03-15 DIAGNOSIS — Z29.89 ENCOUNTER FOR IMMUNOTHERAPY: ICD-10-CM

## 2022-03-15 DIAGNOSIS — C78.02 MALIGNANT NEOPLASM METASTATIC TO BOTH LUNGS: ICD-10-CM

## 2022-03-15 DIAGNOSIS — C78.01 MALIGNANT NEOPLASM METASTATIC TO BOTH LUNGS: ICD-10-CM

## 2022-03-15 LAB
ALBUMIN SERPL BCP-MCNC: 3.4 G/DL (ref 3.4–5)
ALBUMIN/GLOBULIN RATIO: 1.03 RATIO (ref 1.1–1.8)
ALP SERPL-CCNC: 62 U/L (ref 46–116)
ALT SERPL W P-5'-P-CCNC: 13 U/L (ref 12–78)
ANION GAP SERPL CALC-SCNC: 6 MMOL/L (ref 3–11)
AST SERPL-CCNC: 10 U/L (ref 15–37)
BANDS: 1 % (ref 0–5)
BILIRUB SERPL-MCNC: 0.2 MG/DL (ref 0–1)
BUN SERPL-MCNC: 16 MG/DL (ref 7–18)
BUN/CREAT SERPL: 16.49 RATIO (ref 7–18)
CALCIUM SERPL-MCNC: 8.9 MG/DL (ref 8.8–10.5)
CELLS COUNTED: 100
CHLORIDE SERPL-SCNC: 102 MMOL/L (ref 100–108)
CO2 SERPL-SCNC: 31 MMOL/L (ref 21–32)
CREAT SERPL-MCNC: 0.97 MG/DL (ref 0.55–1.02)
EOSINOPHIL NFR BLD: 5 % (ref 1–3)
ERYTHROCYTE [DISTWIDTH] IN BLOOD BY AUTOMATED COUNT: 14.1 % (ref 12.5–18)
GFR ESTIMATION: > 60
GLOBULIN: 3.3 G/DL (ref 2.3–3.5)
GLUCOSE SERPL-MCNC: 204 MG/DL (ref 70–110)
HCT VFR BLD AUTO: 34.2 % (ref 37–47)
HGB BLD-MCNC: 10.9 G/DL (ref 12–16)
LYMPHOCYTES NFR BLD: 18 % (ref 25–40)
MCH RBC QN AUTO: 27.9 PG (ref 27–31.2)
MCHC RBC AUTO-ENTMCNC: 31.9 G/DL (ref 31.8–35.4)
MCV RBC AUTO: 87.5 FL (ref 80–97)
MONOCYTES NFR BLD: 16 % (ref 1–15)
NEUTROPHILS # BLD AUTO: 2.4 10*3/UL (ref 1.8–7.7)
NEUTROPHILS NFR BLD: 60 % (ref 37–80)
NUCLEATED RED BLOOD CELLS: 0 %
PLATELETS: 231 10*3/UL (ref 142–424)
POTASSIUM SERPL-SCNC: 4.2 MMOL/L (ref 3.6–5.2)
PROT SERPL-MCNC: 6.7 G/DL (ref 6.4–8.2)
RBC # BLD AUTO: 3.91 10*6/UL (ref 4.2–5.4)
RBC MORPH BLD: ABNORMAL
SMALL PLATELETS BLD QL SMEAR: ADEQUATE
SODIUM BLD-SCNC: 139 MMOL/L (ref 135–145)
TSH SERPL DL<=0.005 MIU/L-ACNC: 0.76 UIU/ML (ref 0.36–3.74)
WBC # BLD: 4 10*3/UL (ref 4.6–10.2)

## 2022-03-15 PROCEDURE — 1159F MED LIST DOCD IN RCRD: CPT | Mod: CPTII,S$GLB,, | Performed by: NURSE PRACTITIONER

## 2022-03-15 PROCEDURE — 99214 OFFICE O/P EST MOD 30 MIN: CPT | Mod: S$GLB,,, | Performed by: NURSE PRACTITIONER

## 2022-03-15 PROCEDURE — 1160F PR REVIEW ALL MEDS BY PRESCRIBER/CLIN PHARMACIST DOCUMENTED: ICD-10-PCS | Mod: CPTII,S$GLB,, | Performed by: NURSE PRACTITIONER

## 2022-03-15 PROCEDURE — 1159F PR MEDICATION LIST DOCUMENTED IN MEDICAL RECORD: ICD-10-PCS | Mod: CPTII,S$GLB,, | Performed by: NURSE PRACTITIONER

## 2022-03-15 PROCEDURE — 3288F PR FALLS RISK ASSESSMENT DOCUMENTED: ICD-10-PCS | Mod: CPTII,S$GLB,, | Performed by: NURSE PRACTITIONER

## 2022-03-15 PROCEDURE — 3288F FALL RISK ASSESSMENT DOCD: CPT | Mod: CPTII,S$GLB,, | Performed by: NURSE PRACTITIONER

## 2022-03-15 PROCEDURE — 1101F PT FALLS ASSESS-DOCD LE1/YR: CPT | Mod: CPTII,S$GLB,, | Performed by: NURSE PRACTITIONER

## 2022-03-15 PROCEDURE — 99214 PR OFFICE/OUTPT VISIT, EST, LEVL IV, 30-39 MIN: ICD-10-PCS | Mod: S$GLB,,, | Performed by: NURSE PRACTITIONER

## 2022-03-15 PROCEDURE — 1126F PR PAIN SEVERITY QUANTIFIED, NO PAIN PRESENT: ICD-10-PCS | Mod: CPTII,S$GLB,, | Performed by: NURSE PRACTITIONER

## 2022-03-15 PROCEDURE — 1126F AMNT PAIN NOTED NONE PRSNT: CPT | Mod: CPTII,S$GLB,, | Performed by: NURSE PRACTITIONER

## 2022-03-15 PROCEDURE — 1101F PR PT FALLS ASSESS DOC 0-1 FALLS W/OUT INJ PAST YR: ICD-10-PCS | Mod: CPTII,S$GLB,, | Performed by: NURSE PRACTITIONER

## 2022-03-15 PROCEDURE — 1160F RVW MEDS BY RX/DR IN RCRD: CPT | Mod: CPTII,S$GLB,, | Performed by: NURSE PRACTITIONER

## 2022-03-15 RX ORDER — SODIUM CHLORIDE 0.9 % (FLUSH) 0.9 %
10 SYRINGE (ML) INJECTION
Status: CANCELLED | OUTPATIENT
Start: 2022-03-17

## 2022-03-15 RX ORDER — HEPARIN 100 UNIT/ML
500 SYRINGE INTRAVENOUS
Status: CANCELLED | OUTPATIENT
Start: 2022-03-17

## 2022-03-15 NOTE — LETTER
March 15, 2022        MD [facility] - Hematology Oncology  [address]  [place] [address]  Phone: [phone]  Fax: [phone]   Patient: Sherice Haji   MR Number: [id number]   YOB: 1932   Date of Visit: 3/15/2022       Dear Dr. Castillo:    Thank you for referring Sherice Haji to me for evaluation. Attached you will find relevant portions of my assessment and plan of care.    If you have questions, please do not hesitate to call me. I look forward to following Sherice Haji along with you.    Sincerely,      Hoda Josue NP            CC  Adalid Valera MD    [facility]

## 2022-03-15 NOTE — PROGRESS NOTES
Hematology Oncology Progress Note    Hematology Oncology  [facility]     PATIENT: Sherice Haji  : 1932 89 y.o. female  MRN [id number]     PCP: Sejal Castillo MD  Consult Requested By:  No att. providers found    Date of Service: 3/15/2022    Subjective:   Interim History:  Malignant neoplasm od urinary bladder, unspecified site    The patient doing well      Oncology History:    Sherice Haji is here for to followup for bladder cancer.  Current therapy patient doing well without new complaints she specifically denies hematuria or weight loss she has a good appetite.  This is a 87 y.o. female patient with a history of The primary encounter diagnosis was Malignant 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. Diagnoses of Essential hypertension and Type 2 diabetes mellitus without complication, without long-term current use of insulin were also pertinent to this visit., who is referred here for evaluation treatment of bladder cancer.  The patient has been feeling weak and for T for about 2 months.  Urinalysis shows hematuria or and she was referred to Urology.  CT scan with IV contrast demonstrates a bladder mass. Biopsy shows muscle invasive bladder cancer.     The patient lost about 10 lb over the past 2 months.  She denies any abdominal pain chest pain short of breath.     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 Constipation. Prior surgeries. Densely calcified coronary arteries.     ==11/2020 Keytruda restarted  ==5/2021 C scope  A scar as well as necrotic tissue identified in the left superior wall of the bladder consistent with the area of previous resection.  No evidence of recurrent disease.  Ureteral orifices of normal shape,  location and sizes effluxing clear urine.       Oncology History   Malignant neoplasm of urinary bladder   9/25/2019 Initial Diagnosis    Malignant 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     10/21/2019 Cancer Staged    Staging form: Urinary Bladder, AJCC 8th Edition  - Clinical: Stage II (cT2, cN0, cM0)     10/31/2019 - 12/11/2019 Chemotherapy    Treatment Summary   Plan Name: cisplatin taxol weekly  Treatment Goal: Curative  Status: Inactive  Start Date: 10/31/2019  End Date: 12/5/2019  Provider: Hoda Josue NP  Chemotherapy: CISplatin (PLATINOL) 20 mg/m2 = 34 mg in sodium chloride 0.9% 500 mL chemo infusion, 20 mg/m2 = 34 mg (100 % of original dose 20 mg/m2), Intravenous, Clinic/HOD 1 time, 6 of 6 cycles  Dose modification: 20 mg/m2 (original dose 20 mg/m2, Cycle 1)  PACLitaxel (TAXOL) 50 mg/m2 = 84 mg in sodium chloride 0.9% 500 mL chemo infusion, 50 mg/m2 = 84 mg (100 % of original dose 50 mg/m2), Intravenous, Clinic/HOD 1 time, 6 of 6 cycles  Dose modification: 50 mg/m2 (original dose 50 mg/m2, Cycle 1)     6/24/2020 - 6/24/2020 Chemotherapy    Treatment Summary   Plan Name: OP BLADDER GEMCITABINE CISPLATIN (SPLIT DOSE CISPLATIN) Q3W  Treatment Goal: Curative  Status: Inactive  Start Date:   End Date:   Provider: Misael Gordon MD  Chemotherapy: CISplatin (PLATINOL) 35 mg/m2 = 62 mg in sodium chloride 0.9% 500 mL chemo infusion, 35 mg/m2, Intravenous, Clinic/HOD 1 time, 0 of 4 cycles  gemcitabine (GEMZAR) 1,780 mg in sodium chloride 0.9% 250 mL chemo infusion, 1,000 mg/m2, Intravenous, Clinic/HOD 1 time, 0 of 4 cycles     11/11/2020 -   "Chemotherapy    Treatment Summary   Plan Name: OP PEMBROLIZUMAB   Treatment Goal: Control  Status: Active  Start Date: 11/11/2020  End Date: 11/24/2022 (Planned)  Provider: Misael Gordon MD  Chemotherapy: pembrolizumab (KEYTRUDA) 200 mg in sodium chloride 0.9% 100 mL chemo infusion, 200 mg, Intravenous, Clinic/HOD 1 time, 21 of 34 cycles  Dose modification: 400 mg (original dose 200 mg, Cycle 15), 200 mg (original dose 200 mg, Cycle 15), 200 mg (original dose 200 mg, Cycle 16), 200 mg (original dose 200 mg, Cycle 17)         Oncologic History:      Oncologic History     Oncologic Treatment     Pathology      Past Medical History:   Past Medical History:   Diagnosis Date    Absolute anemia 12/5/2019    Allergy     Anxiety     Arthritis     Bladder cancer     Cancer     Cataract     Removed in May 2019    Depression     Diabetes mellitus     Hypertension     Lung cancer     Malignant 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 9/25/2019    Type 2 diabetes mellitus without complication, without long-term current use of insulin 9/25/2019       Past Surgical HIstory:   Past Surgical History:   Procedure Laterality Date    BLADDER SURGERY      surgery on July 22 2020 to see if patients cancer was still present    BLADDER TUMOR EXCISION  09/2019    CATARACT EXTRACTION, BILATERAL  05/2019       Allergies:  Review of patient's allergies indicates:   Allergen Reactions    Aspirin        Medications:  Current Outpatient Medications   Medication Sig Dispense Refill    BD ALCOHOL SWABS PadM       clopidogreL (PLAVIX) 75 mg tablet       diphenoxylate-atropine 2.5-0.025 mg (LOMOTIL) 2.5-0.025 mg per tablet       DROPLET PEN NEEDLE 32 gauge x 5/32" Ndle       glimepiride (AMARYL) 2 MG tablet       HYDROcodone-acetaminophen (NORCO) 5-325 mg per tablet       hyoscyamine (LEVSIN/SL) 0.125 mg Subl       indapamide (LOZOL) 2.5 MG Tab       LANTUS SOLOSTAR U-100 INSULIN glargine 100 units/mL (3mL) SubQ pen  "      levothyroxine (SYNTHROID) 75 MCG tablet       loratadine (CLARITIN) 10 mg tablet       losartan (COZAAR) 100 MG tablet Take 1 tablet by mouth once daily.      meclizine (ANTIVERT) 12.5 mg tablet       meloxicam (MOBIC) 7.5 MG tablet       metFORMIN (GLUCOPHAGE-XR) 500 MG 24 hr tablet       metoprolol tartrate (LOPRESSOR) 50 MG tablet       simvastatin (ZOCOR) 20 MG tablet       traZODone (DESYREL) 50 MG tablet        No current facility-administered medications for this visit.       Family History:   Family History   Problem Relation Age of Onset    No Known Problems Mother     No Known Problems Father     Breast cancer Sister     Prostate cancer Brother     No Known Problems Maternal Aunt     No Known Problems Maternal Uncle     No Known Problems Paternal Aunt     No Known Problems Paternal Uncle     No Known Problems Maternal Grandmother     No Known Problems Maternal Grandfather     No Known Problems Paternal Grandmother     No Known Problems Paternal Grandfather     Breast cancer Sister     Prostate cancer Brother        Social History:  reports that she has never smoked. She has never used smokeless tobacco. She reports previous alcohol use. She reports that she does not use drugs.    Review of Systemas  Constitutional: No change in weight, appetie, fatigue, fever, or night sweats  Eyes: No changes in vision  Ears, Nose, Mouth, Throat, and Face: No hearing problems, ear pain, rummy nose, or sore throat  Respiratory: No shortness of breath or cough  Cardiovascular: No chest pain, palpitations or dizziness  Gastrointestinal: No abdominal pain, hematochezia, melena  Genitourinary: No dysuria, hematuria, nocturia, menstrual problems, post menopausal  Integumentary/Breast: No rashes or itching  Hematologic/Lymphatic: No anemia or bleeding abnormalities  Musculoskeletal: No joint or muscle abnormalities  Neurological: No sensory or motor problems, no headaches  Behavioral/Psych: No  "depression, anxiety, cognitive problems, or stress  Endocrine: No diabetes or thyroid problems  Allergic/Immunologic: See allergy above    Physical Exam      Vitals:   Vitals:    03/15/22 1008   BP: (!) 196/94   BP Location: Right arm   Patient Position: Sitting   BP Method: Medium (Automatic)   Pulse: 85   Resp: 16   SpO2: 95%   Weight: 73.5 kg (162 lb 1.6 oz)   Height: 5' 3" (1.6 m)     BMI: Body mass index is 28.71 kg/m².  BSA Body surface area is 1.81 meters squared.  ECOG Performance Status:   ECOG SCORE        ECOG 1    GENERAL APPEARANCE: Well developed, well nourished, in no acute distress.  SKIN: Inspection of the skin reveals no rashes, ulcerations or petechiae.  HEENT: The sclerae were anicteric and conjunctivae were pink and moist. Extraocular movements were intact and pupils were equal, round, and reactive to light with normal accommodation. External inspection of the ears and nose showed no scars, lesions, or masses. Lips, teeth, and gums showed normal mucosa. The oral mucosa, hard and soft palate, tongue and posterior pharynx were normal.  NECK: Supple and symmetric. There was no thyroid enlargement, and no tenderness, or masses were felt.  CRESPIRATORY: Normal AP diameter and normal contour without any kyphoscoliosis. LUNGS: Auscultation of the lungs revealed normal breath sounds without any other adventitious sounds or rubs.  CARDIOVASCULAR: There was a regular rate and rhythm without any murmurs, gallops, rubs. The carotid pulses were normal and 2+ bilaterally without bruits. Peripheral pulses were 2+ and symmetric.  GASTROINTESTINAL: Soft and nontender with normal bowel sounds. The liver span was approximately 5-6 cm in the right midclavicular line by percussion. The liver edge was nontender. The spleen was not palpable. There were no inguinal or umbilical hernias noted. No ascites was noted.  LYMPH NODES: No lymphadenopathy was appreciated in the neck, axillae or groin.  MUSCULOSKELETAL: Gait was " normal. There was no tenderness or effusions noted. Muscle strength and tone were normal. EXTREMITIES: No cyanosis, clubbing or edema.  NEUROLOGIC: Alert and oriented x 3. Normal affect. Gait was normal. Normal deep tendon reflexes with no pathological reflexes. Sensation to touch was normal.  PSYCHIATRIC: good mood, orientated to place, time and person     Labs and Imagings     Lab Results   Component Value Date    WBC 4.0 (L) 03/15/2022    HGB 10.9 (L) 03/15/2022    HCT 34.2 (L) 03/15/2022    MCV 87.5 03/15/2022    LABPLAT 231 03/15/2022       CMP  Sodium   Date Value Ref Range Status   03/15/2022 139 135 - 145 mmol/L Final   10/14/2019 133 (L) 136 - 145 mmol/L Final     Potassium   Date Value Ref Range Status   03/15/2022 4.2 3.6 - 5.2 mmol/L Final   10/14/2019 4.4 3.5 - 5.1 mmol/L Final     Chloride   Date Value Ref Range Status   03/15/2022 102 100 - 108 mmol/L Final   10/14/2019 97 (L) 98 - 107 mmol/L Final     CO2   Date Value Ref Range Status   03/15/2022 31 21 - 32 mmol/L Final   10/14/2019 28 22 - 29 mmol/L Final     Glucose   Date Value Ref Range Status   03/15/2022 204 (H) 70 - 110 mg/dL Final     BUN   Date Value Ref Range Status   03/15/2022 16 7 - 18 mg/dL Final   10/14/2019 15.8 8 - 23 mg/dL Final     Creatinine   Date Value Ref Range Status   03/15/2022 0.97 0.55 - 1.02 mg/dL Final   10/14/2019 0.74 0.50 - 0.90 mg/dL Final     Calcium   Date Value Ref Range Status   03/15/2022 8.9 8.8 - 10.5 mg/dL Final   10/14/2019 9.9 8.6 - 10.2 mg/dL Final     Total Protein   Date Value Ref Range Status   03/15/2022 6.7 6.4 - 8.2 g/dL Final     Albumin   Date Value Ref Range Status   03/15/2022 3.4 3.4 - 5.0 g/dL Final   10/14/2019 4.3 3.5 - 5.2 g/dL Final     Total Bilirubin   Date Value Ref Range Status   03/15/2022 0.2 0.0 - 1.0 mg/dL Final     Alkaline Phosphatase   Date Value Ref Range Status   03/15/2022 62 46 - 116 U/L Final     AST   Date Value Ref Range Status   03/15/2022 10 (L) 15 - 37 U/L Final    10/14/2019 11 0 - 32 U/L Final     ALT   Date Value Ref Range Status   03/15/2022 13 12 - 78 U/L Final     Anion Gap   Date Value Ref Range Status   03/15/2022 6.0 3.0 - 11.0 mmol/L Final   10/14/2019 8.0 8.0 - 17.0 mmol/L Final     Comment:     NOTE  Testing performed at:  [facility], [address] [place]  [address] CLIA #:[id number]         All cell laboratory reviewed which shows white cell 1.7 and sodium 126  Imaging: CT C/A/P 21:  CT Abdomen Pelvis  Without Contrast                                RADIOLOGY REPORT        PT NAME: SANDOVAL GUZMÁN      [facility]     : 1932 F 89             [address]    ACCT: [id number]                                              [place]    Med Rec #: [id number]                                        [id number]    [facility]             Procedure: CT ABD   PELVIS WO CONTRAST    REQUISITION #: [id number]      REPORT #: [id number]           DATE OF EXAM: 21    TIME OF EXAM: 0845       CT ABD   PELVIS WO CONTRAST    CMS MANDATED QUALITY DATA CT RADIATION 436    *All CT scans at this facility uses dose modulation and/or weight-based   dosing when appropriate to reduce radiation dose to as low as reasonably   achievable.            CLINICAL HISTORY:    History of bladder cancer        TECHNIQUE:    Acquisition: Contiguous scan slices were obtained from the top of the   hemidiaphragm through the pelvis.    Reconstructions: Axial, coronal and sagittal reconstructions.         Contrast:    IV : None given.    Oral :  None administered.    Phases: Noncontrasted imaging.    Limitations: Evaluation of the abdominal viscera and gastrointestinal tract    is limited by the lack of contrast.    Estimated radiation dose: 14.86 mSv.        COMPARISON:    No prior relevant studies are available at this time.        FINDINGS:    Lower chest: Normal.        Abdomen:    Liver:  Normal.         Spleen: Normal.        Gallbladder and biliary tree: Normal.        Pancreas: Normal.        Adrenal glands: Normal.        Urinary tract:    Kidneys: Normal.    Pelvocalyceal systems: Normal. No hydronephrosis.    Ureters: Normal. No hydroureter        Retroperitoneum: Normal.        Mesentery and gastrointestinal tract: Normal.        Pelvis:    Urinary bladder: Normal.        Inguinal regions: Normal.        Vasculature:    1. Arterial calcification without aneurysm is present.        Osseous structures:    1. Degenerative changes noted throughout the spine with levoscoliosis.        Abdominal wall: Normal.        Additional findings: None seen.        IMPRESSION:        1.  No acute intra-abdominal pathology.            This document was created using a voice recognition transcribing system.   Incorrect words or phrases may have been missed during proof reading. Please   interpret accordingly or contact the radiologist for clarification if   necessary.                DICTATING PHYSICIAN:  ELLIE RAMAN MD                   Date Dictated: 21        Signed By:  ELLIE RAMAN MD <Electronically signed by ELLIE RAMAN MD in OV>    Date Signed:  21     CC: DELMI ANTONIO MD ; DELMI ANTONIO MD      ADMITTING PHYSICIAN:                                                                                                    ORDERING PHY: DELMI ANTONIO MD                                                                                                                                                      ATTENDING PHY: DELMI ANTONIO MD    Patient Status:  REG CLI    Admit Service Date: 21       CT Chest Without Contrast                                RADIOLOGY REPORT        PT NAME: SANDOVAL GUZMÁN      [facility]     : 1932 F 89             [address]    ACCT: [id number]                                              [facility]  Rec #: [id number]                                        [id number]    [facility]             Procedure: CHEST THORAX WO CONT    REQUISITION #: [id number]      REPORT #: [id number]           DATE OF EXAM: 12/28/21    TIME OF EXAM: 0900       CHEST THORAX WO CONT    CMS MANDATED QUALITY DATA CT RADIATION 436    *All CT scans at this facility uses dose modulation and/or weight-based   dosing when appropriate to reduce radiation dose to as low as reasonably   achievable.        Clinical history:    Bladder cancer        Technique:    Acquisition: Contiguous scan slices were obtained from the apex of the lungs   through the diaphragms.    Reconstructions: Axial, coronal and sagittal. reconstructions of the data   set were obtained.    Contrast:    IV: No contrast was administered.    Other: None administered.    Phases: Noncontrasted images.    Limitations: No technical limitations noted.    Estimated radiation dose: 14.86 mSv.        Comparison:    September 16, 2021        Findings:    Lungs, pleura and large airways:    1. Several small nodules are present in the lungs most notably in the right    middle lobe region. These appear to be stable for size and configuration.   Please continue to be approximately 5-6 mm in size. These appear to be   stable when compared to the previous exam. No new lung nodules are   demonstrated.        Heart:    1 coronary artery calcifications are noted.        Systemic vasculature: Normal.        Pulmonary vasculature: Normal.        Mediastinal and hilar structures: Normal.        Chest wall, axilla and lower neck: Normal.        Upper abdomen: Please see the CT of the abdomen report        Osseous structures:    1. Degenerative changes are noted throughout the spine.        Additional findings: None seen.        Impression        1.  Multiple small pulmonary nodules remain present bilaterally. These   appear to be stable for size and number.            This document was  created using a voice recognition transcribing system.   Incorrect words or phrases may have been missed during proof reading. Please   interpret accordingly or contact the radiologist for clarification if   necessary.        DICTATING PHYSICIAN:  ELLIE RAMAN MD                   Date Dictated: 12/28/21 0934        Signed By:  ELLIE RAMAN MD <Electronically signed by ELLIE RAMAN MD in OV>    Date Signed:  12/28/21 0939     CC: DELMI ANTONIO MD ; DELMI ANTONIO MD      ADMITTING PHYSICIAN:                                                                                                    ORDERING PHY: DELMI ANTONIO MD                                                                                                                                                      ATTENDING PHY: DELMI ANTONIO MD    Patient Status:  REG CLI    Admit Service Date: 12/28/21                   Assessment:     Principle Problem: No primary diagnosis found.   Co-morbidity/Active Problems:   Patient Active Problem List   Diagnosis    Malignant neoplasm of urinary bladder    Essential hypertension    Type 2 diabetes mellitus without complication, without long-term current use of insulin    Encounter for antineoplastic chemotherapy    Chemotherapy-induced nausea    Chemotherapy-induced neutropenia    Absolute anemia    Encounter for immunotherapy    Malignant neoplasm metastatic to both lungs    Elevated blood sugar        Sherice Haji is a 89 y.o. female with PMH of There were no encounter diagnoses., with No primary diagnosis found.   Sherice Haji is a 87 y.o. 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 [C67.3]    87-year-old female otherwise healthy except hypertension diabetes present with bladder mass biopsy-proven muscle invasive bladder cancer( stage II) and     biopsy-proven muscle invasive bladder cancer( stage II) recurrence  Normal renal function  Neutropenia  Status post chemo radiation therapy 2019  recurrent muscle invasive bladder cance  3/17/21Pulm nodules noted on CT Chest     Plan:  Status post   Overall Plan:  Concurrent chemoradiation therapy, followed by optional cystectomy and full dose chemo C/G; patient does not want chemotherapy so the other option is immunotherapy  21:  Patient continues to tolerate Keytruda without any complaint.  her elevated blood sugars are being followed by her PCP   ==CT chest  scan reviewed will request comparison to CT [facility] 3/[facility]  ==RTC in 3 weeks with labs   21: patient continues to tolerate tx well but some fatigue noted, did not get ct scans done, will help schedule scans prior to next appt. Labs reviewed and pt cleared for tx.  Pt recently [facility] into [facility] last month.   22:  Recent CT scans show stable pulmonary nodules, currently no evidence of disease in abdomen and pelvis.  Patient continues to tolerate immunotherapy very well, labs reviewed and patient will continue with current therapy as planned.  She denies any diarrhea, cough, rash or itching                                                        Hematology Oncology Progress Note    Hematology Oncology  [facility]     PATIENT: Sherice Haji  : 1932 89 y.o. female  MRN [id number]     PCP: Sejal Castillo MD  Consult Requested By:  No att. providers found    Date of Service: 3/15/2022    Subjective:   Interim History:  Malignant neoplasm od urinary bladder, unspecified site    The patient doing well      Oncology History:    Sherice Haji is here for to followup for bladder cancer.  Current therapy patient doing well without  new complaints she specifically denies hematuria or weight loss she has a good appetite.  This is a 87 y.o. female patient with a history of The primary encounter diagnosis was Malignant 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. Diagnoses of Essential hypertension and Type 2 diabetes mellitus without complication, without long-term current use of insulin were also pertinent to this visit., who is referred here for evaluation treatment of bladder cancer.  The patient has been feeling weak and for T for about 2 months.  Urinalysis shows hematuria or and she was referred to Urology.  CT scan with IV contrast demonstrates a bladder mass. Biopsy shows muscle invasive bladder cancer.     The patient lost about 10 lb over the past 2 months.  She denies any abdominal pain chest pain short of breath.     ==12/2019 completed concurrent chemoradiation therapy as a bladder preservation  ==4/2020 Cyst NEO in bladder  ==4/2020 discussed a chemotherapy options such as cisplatin gemcitabine as a 20 % dose reduction; she declined chemotherapy  ==7/2020 recurrence muscle invasive bladder cancer on cystoscope  ==8/2020 we discussed options for her; for those chemotherapy with cisplatin gemcitabine versus immunotherapy with ago to induce local response and hopefully bladder preservation as a salvage; she prefer immunotherapy  == she was displaced by H Alessandra  ==11/30/20  CT There is some wall thickening along the left anterior aspect of the bladder unchanged. Constipation. Prior surgeries. Densely calcified coronary arteries.     ==11/2020 Keytruda restarted  ==5/2021 C scope  A scar as well as necrotic tissue identified in the left superior wall of the bladder consistent with the area of previous resection.  No evidence of recurrent disease.  Ureteral orifices of normal shape,  location and sizes effluxing clear urine.       Oncology History   Malignant neoplasm of urinary bladder   9/25/2019 Initial Diagnosis    Malignant neoplasm  of anterior wall of urinary bladder     10/21/2019 Cancer Staged    Staging form: Urinary Bladder, AJCC 8th Edition  - Clinical: Stage II (cT2, cN0, cM0)     10/31/2019 - 12/11/2019 Chemotherapy    Treatment Summary   Plan Name: cisplatin taxol weekly  Treatment Goal: Curative  Status: Inactive  Start Date: 10/31/2019  End Date: 12/5/2019  Provider: Hoda Josue NP  Chemotherapy: CISplatin (PLATINOL) 20 mg/m2 = 34 mg in sodium chloride 0.9% 500 mL chemo infusion, 20 mg/m2 = 34 mg (100 % of original dose 20 mg/m2), Intravenous, Clinic/HOD 1 time, 6 of 6 cycles  Dose modification: 20 mg/m2 (original dose 20 mg/m2, Cycle 1)  PACLitaxel (TAXOL) 50 mg/m2 = 84 mg in sodium chloride 0.9% 500 mL chemo infusion, 50 mg/m2 = 84 mg (100 % of original dose 50 mg/m2), Intravenous, Clinic/HOD 1 time, 6 of 6 cycles  Dose modification: 50 mg/m2 (original dose 50 mg/m2, Cycle 1)     6/24/2020 - 6/24/2020 Chemotherapy    Treatment Summary   Plan Name: OP BLADDER GEMCITABINE CISPLATIN (SPLIT DOSE CISPLATIN) Q3W  Treatment Goal: Curative  Status: Inactive  Start Date:   End Date:   Provider: Misael Gordon MD  Chemotherapy: CISplatin (PLATINOL) 35 mg/m2 = 62 mg in sodium chloride 0.9% 500 mL chemo infusion, 35 mg/m2, Intravenous, Clinic/HOD 1 time, 0 of 4 cycles  gemcitabine (GEMZAR) 1,780 mg in sodium chloride 0.9% 250 mL chemo infusion, 1,000 mg/m2, Intravenous, Clinic/HOD 1 time, 0 of 4 cycles     11/11/2020 -  Chemotherapy    Treatment Summary   Plan Name: OP PEMBROLIZUMAB   Treatment Goal: Control  Status: Active  Start Date: 11/11/2020  End Date: 11/24/2022 (Planned)  Provider: Misael Gordon MD  Chemotherapy: pembrolizumab (KEYTRUDA) 200 mg in sodium chloride 0.9% 100 mL chemo infusion, 200 mg, Intravenous, Clinic/HOD 1 time, 21 of 34 cycles  Dose modification: 400 mg (original dose 200 mg, Cycle 15), 200 mg (original dose 200 mg, Cycle 15), 200 mg (original dose 200 mg, Cycle 16), 200 mg (original dose 200 mg, Cycle 17)    "      Oncologic History:      Oncologic History     Oncologic Treatment     Pathology      Past Medical History:   Past Medical History:   Diagnosis Date    Absolute anemia 12/5/2019    Allergy     Anxiety     Arthritis     Bladder cancer     Cancer     Cataract     Removed in May 2019    Depression     Diabetes mellitus     Hypertension     Lung cancer     Malignant 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 9/25/2019    Type 2 diabetes mellitus without complication, without long-term current use of insulin 9/25/2019       Past Surgical HIstory:   Past Surgical History:   Procedure Laterality Date    BLADDER SURGERY      surgery on July 22 2020 to see if patients cancer was still present    BLADDER TUMOR EXCISION  09/2019    CATARACT EXTRACTION, BILATERAL  05/2019       Allergies:  Review of patient's allergies indicates:   Allergen Reactions    Aspirin        Medications:  Current Outpatient Medications   Medication Sig Dispense Refill    BD ALCOHOL SWABS PadM       clopidogreL (PLAVIX) 75 mg tablet       diphenoxylate-atropine 2.5-0.025 mg (LOMOTIL) 2.5-0.025 mg per tablet       DROPLET PEN NEEDLE 32 gauge x 5/32" Ndle       glimepiride (AMARYL) 2 MG tablet       HYDROcodone-acetaminophen (NORCO) 5-325 mg per tablet       hyoscyamine (LEVSIN/SL) 0.125 mg Subl       indapamide (LOZOL) 2.5 MG Tab       LANTUS SOLOSTAR U-100 INSULIN glargine 100 units/mL (3mL) SubQ pen       levothyroxine (SYNTHROID) 75 MCG tablet       loratadine (CLARITIN) 10 mg tablet       losartan (COZAAR) 100 MG tablet Take 1 tablet by mouth once daily.      meclizine (ANTIVERT) 12.5 mg tablet       meloxicam (MOBIC) 7.5 MG tablet       metFORMIN (GLUCOPHAGE-XR) 500 MG 24 hr tablet       metoprolol tartrate (LOPRESSOR) 50 MG tablet       simvastatin (ZOCOR) 20 MG tablet       traZODone (DESYREL) 50 MG tablet        No current facility-administered medications for this visit.       Family History: " "  Family History   Problem Relation Age of Onset    No Known Problems Mother     No Known Problems Father     Breast cancer Sister     Prostate cancer Brother     No Known Problems Maternal Aunt     No Known Problems Maternal Uncle     No Known Problems Paternal Aunt     No Known Problems Paternal Uncle     No Known Problems Maternal Grandmother     No Known Problems Maternal Grandfather     No Known Problems Paternal Grandmother     No Known Problems Paternal Grandfather     Breast cancer Sister     Prostate cancer Brother        Social History:  reports that she has never smoked. She has never used smokeless tobacco. She reports previous alcohol use. She reports that she does not use drugs.    Review of Systemas  Constitutional: No change in weight, appetie, fatigue, fever, or night sweats  Eyes: No changes in vision  Ears, Nose, Mouth, Throat, and Face: No hearing problems, ear pain, rummy nose, or sore throat  Respiratory: No shortness of breath or cough  Cardiovascular: No chest pain, palpitations or dizziness  Gastrointestinal: No abdominal pain, hematochezia, melena  Genitourinary: No dysuria, hematuria, nocturia, menstrual problems, post menopausal  Integumentary/Breast: No rashes or itching  Hematologic/Lymphatic: No anemia or bleeding abnormalities  Musculoskeletal: No joint or muscle abnormalities  Neurological: No sensory or motor problems, no headaches  Behavioral/Psych: No depression, anxiety, cognitive problems, or stress  Endocrine: No diabetes or thyroid problems  Allergic/Immunologic: See allergy above    Physical Exam      Vitals:   Vitals:    03/15/22 1008   BP: (!) 196/94   BP Location: Right arm   Patient Position: Sitting   BP Method: Medium (Automatic)   Pulse: 85   Resp: 16   SpO2: 95%   Weight: 73.5 kg (162 lb 1.6 oz)   Height: 5' 3" (1.6 m)     BMI: Body mass index is 28.71 kg/m².  BSA Body surface area is 1.81 meters squared.  ECOG Performance Status:   ECOG SCORE        " ECOG 1    GENERAL APPEARANCE: Well developed, well nourished, in no acute distress.  SKIN: Inspection of the skin reveals no rashes, ulcerations or petechiae.  HEENT: The sclerae were anicteric and conjunctivae were pink and moist. Extraocular movements were intact and pupils were equal, round, and reactive to light with normal accommodation. External inspection of the ears and nose showed no scars, lesions, or masses. Lips, teeth, and gums showed normal mucosa. The oral mucosa, hard and soft palate, tongue and posterior pharynx were normal.  NECK: Supple and symmetric. There was no thyroid enlargement, and no tenderness, or masses were felt.  CRESPIRATORY: Normal AP diameter and normal contour without any kyphoscoliosis. LUNGS: Auscultation of the lungs revealed normal breath sounds without any other adventitious sounds or rubs.  CARDIOVASCULAR: There was a regular rate and rhythm without any murmurs, gallops, rubs. The carotid pulses were normal and 2+ bilaterally without bruits. Peripheral pulses were 2+ and symmetric.  GASTROINTESTINAL: Soft and nontender with normal bowel sounds. The liver span was approximately 5-6 cm in the right midclavicular line by percussion. The liver edge was nontender. The spleen was not palpable. There were no inguinal or umbilical hernias noted. No ascites was noted.  LYMPH NODES: No lymphadenopathy was appreciated in the neck, axillae or groin.  MUSCULOSKELETAL: Gait was normal. There was no tenderness or effusions noted. Muscle strength and tone were normal. EXTREMITIES: No cyanosis, clubbing or edema.  NEUROLOGIC: Alert and oriented x 3. Normal affect. Gait was normal. Normal deep tendon reflexes with no pathological reflexes. Sensation to touch was normal.  PSYCHIATRIC: good mood, orientated to place, time and person     Labs and Imagings     Lab Results   Component Value Date    WBC 4.0 (L) 03/15/2022    HGB 10.9 (L) 03/15/2022    HCT 34.2 (L) 03/15/2022    MCV 87.5 03/15/2022     LABPLAT 231 03/15/2022       CMP  Sodium   Date Value Ref Range Status   03/15/2022 139 135 - 145 mmol/L Final   10/14/2019 133 (L) 136 - 145 mmol/L Final     Potassium   Date Value Ref Range Status   03/15/2022 4.2 3.6 - 5.2 mmol/L Final   10/14/2019 4.4 3.5 - 5.1 mmol/L Final     Chloride   Date Value Ref Range Status   03/15/2022 102 100 - 108 mmol/L Final   10/14/2019 97 (L) 98 - 107 mmol/L Final     CO2   Date Value Ref Range Status   03/15/2022 31 21 - 32 mmol/L Final   10/14/2019 28 22 - 29 mmol/L Final     Glucose   Date Value Ref Range Status   03/15/2022 204 (H) 70 - 110 mg/dL Final     BUN   Date Value Ref Range Status   03/15/2022 16 7 - 18 mg/dL Final   10/14/2019 15.8 8 - 23 mg/dL Final     Creatinine   Date Value Ref Range Status   03/15/2022 0.97 0.55 - 1.02 mg/dL Final   10/14/2019 0.74 0.50 - 0.90 mg/dL Final     Calcium   Date Value Ref Range Status   03/15/2022 8.9 8.8 - 10.5 mg/dL Final   10/14/2019 9.9 8.6 - 10.2 mg/dL Final     Total Protein   Date Value Ref Range Status   03/15/2022 6.7 6.4 - 8.2 g/dL Final     Albumin   Date Value Ref Range Status   03/15/2022 3.4 3.4 - 5.0 g/dL Final   10/14/2019 4.3 3.5 - 5.2 g/dL Final     Total Bilirubin   Date Value Ref Range Status   03/15/2022 0.2 0.0 - 1.0 mg/dL Final     Alkaline Phosphatase   Date Value Ref Range Status   03/15/2022 62 46 - 116 U/L Final     AST   Date Value Ref Range Status   03/15/2022 10 (L) 15 - 37 U/L Final   10/14/2019 11 0 - 32 U/L Final     ALT   Date Value Ref Range Status   03/15/2022 13 12 - 78 U/L Final     Anion Gap   Date Value Ref Range Status   03/15/2022 6.0 3.0 - 11.0 mmol/L Final   10/14/2019 8.0 8.0 - 17.0 mmol/L Final     Comment:     NOTE  Testing performed at:  [facility], [address] [place] [address]  [id number] CLIA #:[id number]         All cell laboratory reviewed which shows white cell 1.7 and sodium 126  Imaging: CT C/A/P 12/27/21:  CT Abdomen Pelvis  Without Contrast                                 RADIOLOGY REPORT        PT NAME: SANDOVAL GUZMÁN      [facility]     : 1932 F 89             [address]    ACCT: [id number]                                              [place], [place]    [facility] Rec #: [id number]                                        [id number]    [facility]             Procedure: CT ABD   PELVIS WO CONTRAST    REQUISITION #: [id number]      REPORT #: [id number]           DATE OF EXAM: 21    TIME OF EXAM: 0845       CT ABD   PELVIS WO CONTRAST    CMS MANDATED QUALITY DATA CT RADIATION 436    *All CT scans at this facility uses dose modulation and/or weight-based   dosing when appropriate to reduce radiation dose to as low as reasonably   achievable.            CLINICAL HISTORY:    History of bladder cancer        TECHNIQUE:    Acquisition: Contiguous scan slices were obtained from the top of the   hemidiaphragm through the pelvis.    Reconstructions: Axial, coronal and sagittal reconstructions.         Contrast:    IV : None given.    Oral :  None administered.    Phases: Noncontrasted imaging.    Limitations: Evaluation of the abdominal viscera and gastrointestinal tract    is limited by the lack of contrast.    Estimated radiation dose: 14.86 mSv.        COMPARISON:    No prior relevant studies are available at this time.        FINDINGS:    Lower chest: Normal.        Abdomen:    Liver:  Normal.        Spleen: Normal.        Gallbladder and biliary tree: Normal.        Pancreas: Normal.        Adrenal glands: Normal.        Urinary tract:    Kidneys: Normal.    Pelvocalyceal systems: Normal. No hydronephrosis.    Ureters: Normal. No hydroureter        Retroperitoneum: Normal.        Mesentery and gastrointestinal tract: Normal.        Pelvis:    Urinary bladder: Normal.        Inguinal regions: Normal.        Vasculature:    1. Arterial calcification without aneurysm is present.        Osseous structures:    1. Degenerative  changes noted throughout the spine with levoscoliosis.        Abdominal wall: Normal.        Additional findings: None seen.        IMPRESSION:        1.  No acute intra-abdominal pathology.            This document was created using a voice recognition transcribing system.   Incorrect words or phrases may have been missed during proof reading. Please   interpret accordingly or contact the radiologist for clarification if   necessary.                DICTATING PHYSICIAN:  ELLIE RAMAN MD                   Date Dictated: 21        Signed By:  ELLIE RAMAN MD <Electronically signed by ELLIE RAMAN MD in OV>    Date Signed:  21     CC: DELMI ANTONIO MD ; DELMI ANTONIO MD      ADMITTING PHYSICIAN:                                                                                                    ORDERING PHY: DELMI ANTONIO MD                                                                                                                                                      ATTENDING PHY: DELMI ANTONIO MD    Patient Status:  REG CLI    Admit Service Date: 21       CT Chest Without Contrast                                RADIOLOGY REPORT        PT NAME: SANDOVAL GUZMÁN      [facility]     : 1932 F 89             [address]    ACCT: [id number]                                              [facility] Rec #: [id number]                                        [id number]    [facility]             Procedure: CHEST THORAX WO CONT    REQUISITION #: [id number]      REPORT #: [id number]           DATE OF EXAM: 21    TIME OF EXAM: 0900       CHEST THORAX WO CONT    CMS MANDATED QUALITY DATA CT RADIATION 436    *All CT scans at this facility uses dose modulation and/or weight-based   dosing when appropriate to reduce radiation dose to as low as reasonably   achievable.        Clinical history:    Bladder cancer         Technique:    Acquisition: Contiguous scan slices were obtained from the apex of the lungs   through the diaphragms.    Reconstructions: Axial, coronal and sagittal. reconstructions of the data   set were obtained.    Contrast:    IV: No contrast was administered.    Other: None administered.    Phases: Noncontrasted images.    Limitations: No technical limitations noted.    Estimated radiation dose: 14.86 mSv.        Comparison:    September 16, 2021        Findings:    Lungs, pleura and large airways:    1. Several small nodules are present in the lungs most notably in the right    middle lobe region. These appear to be stable for size and configuration.   Please continue to be approximately 5-6 mm in size. These appear to be   stable when compared to the previous exam. No new lung nodules are   demonstrated.        Heart:    1 coronary artery calcifications are noted.        Systemic vasculature: Normal.        Pulmonary vasculature: Normal.        Mediastinal and hilar structures: Normal.        Chest wall, axilla and lower neck: Normal.        Upper abdomen: Please see the CT of the abdomen report        Osseous structures:    1. Degenerative changes are noted throughout the spine.        Additional findings: None seen.        Impression        1.  Multiple small pulmonary nodules remain present bilaterally. These   appear to be stable for size and number.            This document was created using a voice recognition transcribing system.   Incorrect words or phrases may have been missed during proof reading. Please   interpret accordingly or contact the radiologist for clarification if   necessary.        DICTATING PHYSICIAN:  ELLIE RAMAN MD                   Date Dictated: 12/28/21 0934        Signed By:  ELLIE RAMAN MD <Electronically signed by ELLIE RAMAN MD in OV>    Date Signed:  12/28/21 0939     CC: DELMI ANTONIO MD ; DELMI ANTONIO MD      ADMITTING PHYSICIAN:                                                                                                     ORDERING PHY: DELMI ANTONIO MD                                                                                                                                                      ATTENDING PHY: DELMI ANTONIO MD    Patient Status:  REG CLI    Admit Service Date: 12/28/21                   Assessment:     Principle Problem: No primary diagnosis found.   Co-morbidity/Active Problems:   Patient Active Problem List   Diagnosis    Malignant neoplasm of urinary bladder    Essential hypertension    Type 2 diabetes mellitus without complication, without long-term current use of insulin    Encounter for antineoplastic chemotherapy    Chemotherapy-induced nausea    Chemotherapy-induced neutropenia    Absolute anemia    Encounter for immunotherapy    Malignant neoplasm metastatic to both lungs    Elevated blood sugar        Sherice Haji is a 89 y.o. female with PMH of The primary encounter diagnosis was Malignant neoplasm of urinary bladder, unspecified site. Diagnoses of Malignant neoplasm metastatic to both lungs and Encounter for immunotherapy were also pertinent to this visit., with Malignant neoplasm of urinary bladder, unspecified site [C67.9]   Sherice Haji is a 87 y.o. 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 [C67.3]   87-year-old female otherwise healthy except hypertension diabetes present with bladder mass biopsy-proven muscle invasive bladder cancer( stage II) and     biopsy-proven muscle invasive bladder cancer( stage II) recurrence  Normal renal function  Neutropenia  Status post chemo radiation therapy December  2019  recurrent muscle invasive bladder cance  3/17/21Pulm nodules noted on CT Chest     Plan:  Status post   Overall Plan:  Concurrent chemoradiation therapy, followed by optional cystectomy and full dose chemo C/G; patient does not want chemotherapy so the other option is immunotherapy  12/2/21:  Patient continues to tolerate Keytruda without any complaint.  her elevated blood sugars are being followed by her PCP   ==CT chest  scan reviewed will request comparison to CT Chest done 3/21 at [facility]  ==RTC in 3 weeks with labs   12/22/21: patient continues to tolerate tx well but some fatigue noted, did not get ct scans done, will help schedule scans prior to next appt. Labs reviewed and pt cleared for tx.  Pt recently moved into [facility] last month.   1/11/22:  Recent CT scans show stable pulmonary nodules, currently no evidence of disease in abdomen and pelvis.  Patient continues to tolerate immunotherapy very well, labs reviewed and patient will continue with current therapy as planned.  She denies any diarrhea, cough, rash or itching  3/15/22: pt doing well, no new c/o. She continues to tolerate tx without any side effects noted. Labs reviewed and pt cleared for tx as planned for Thursday. RTC in 3 weeks with labs.    -Total time spent in counseling and discussion about further management options including relevant lab work, treatment,  prognosis, medications and intended side effects was more than 25 minutes. More than 50% of the time was spent on counseling and coordination of care.  This includes face to face time and non-face to face time preparing to see the patient (eg, review of tests), Obtaining and/or reviewing separately obtained history, Documenting clinical information in the electronic or other health record, Independently interpreting resultsand communicating results to the patient/family/caregiver, or Care coordination.

## 2022-04-05 ENCOUNTER — TELEPHONE (OUTPATIENT)
Dept: HEMATOLOGY/ONCOLOGY | Facility: CLINIC | Age: 87
End: 2022-04-05

## 2022-04-05 ENCOUNTER — OFFICE VISIT (OUTPATIENT)
Dept: HEMATOLOGY/ONCOLOGY | Facility: CLINIC | Age: 87
End: 2022-04-05
Payer: COMMERCIAL

## 2022-04-05 ENCOUNTER — CLINICAL SUPPORT (OUTPATIENT)
Dept: HEMATOLOGY/ONCOLOGY | Facility: CLINIC | Age: 87
End: 2022-04-05
Payer: COMMERCIAL

## 2022-04-05 VITALS
SYSTOLIC BLOOD PRESSURE: 165 MMHG | OXYGEN SATURATION: 96 % | TEMPERATURE: 97 F | WEIGHT: 159.19 LBS | HEART RATE: 80 BPM | BODY MASS INDEX: 28.21 KG/M2 | HEIGHT: 63 IN | RESPIRATION RATE: 18 BRPM | DIASTOLIC BLOOD PRESSURE: 90 MMHG

## 2022-04-05 DIAGNOSIS — C67.9 MALIGNANT NEOPLASM OF URINARY BLADDER, UNSPECIFIED SITE: Primary | ICD-10-CM

## 2022-04-05 DIAGNOSIS — E03.9 HYPOTHYROIDISM, UNSPECIFIED TYPE: ICD-10-CM

## 2022-04-05 DIAGNOSIS — C78.01 MALIGNANT NEOPLASM METASTATIC TO BOTH LUNGS: ICD-10-CM

## 2022-04-05 DIAGNOSIS — Z29.89 ENCOUNTER FOR IMMUNOTHERAPY: ICD-10-CM

## 2022-04-05 DIAGNOSIS — C78.02 MALIGNANT NEOPLASM METASTATIC TO BOTH LUNGS: ICD-10-CM

## 2022-04-05 DIAGNOSIS — C67.9 MALIGNANT NEOPLASM OF URINARY BLADDER, UNSPECIFIED SITE: ICD-10-CM

## 2022-04-05 LAB
ALBUMIN SERPL BCP-MCNC: 3.3 G/DL (ref 3.4–5)
ALBUMIN/GLOBULIN RATIO: 0.94 RATIO (ref 1.1–1.8)
ALP SERPL-CCNC: 66 U/L (ref 46–116)
ALT SERPL W P-5'-P-CCNC: 13 U/L (ref 12–78)
ANION GAP SERPL CALC-SCNC: 8 MMOL/L (ref 3–11)
AST SERPL-CCNC: 10 U/L (ref 15–37)
BANDS: 1 % (ref 0–5)
BILIRUB SERPL-MCNC: 0.3 MG/DL (ref 0–1)
BUN SERPL-MCNC: 21 MG/DL (ref 7–18)
BUN/CREAT SERPL: 18.1 RATIO
CALCIUM SERPL-MCNC: 8.8 MG/DL (ref 8.8–10.5)
CELLS COUNTED: 100
CHLORIDE SERPL-SCNC: 96 MMOL/L (ref 100–108)
CO2 SERPL-SCNC: 28 MMOL/L (ref 21–32)
CREAT SERPL-MCNC: 1.16 MG/DL (ref 0.55–1.02)
EOSINOPHIL NFR BLD: 3 % (ref 1–3)
ERYTHROCYTE [DISTWIDTH] IN BLOOD BY AUTOMATED COUNT: 13.9 % (ref 12.5–18)
GFR ESTIMATION: 53
GLOBULIN: 3.5 G/DL (ref 2.3–3.5)
GLUCOSE SERPL-MCNC: 241 MG/DL (ref 70–110)
HCT VFR BLD AUTO: 33.2 % (ref 37–47)
HGB BLD-MCNC: 10.9 G/DL (ref 12–16)
LYMPHOCYTES NFR BLD: 19 % (ref 25–40)
MCH RBC QN AUTO: 27.6 PG (ref 27–31.2)
MCHC RBC AUTO-ENTMCNC: 32.8 G/DL (ref 31.8–35.4)
MCV RBC AUTO: 84.1 FL (ref 80–97)
MONOCYTES NFR BLD: 20 % (ref 1–15)
NEUTROPHILS # BLD AUTO: 2.3 10*3/UL (ref 1.8–7.7)
NEUTROPHILS NFR BLD: 57 % (ref 37–80)
NUCLEATED RED BLOOD CELLS: 0 %
PLATELETS: 258 10*3/UL (ref 142–424)
POTASSIUM SERPL-SCNC: 4.5 MMOL/L (ref 3.6–5.2)
PROT SERPL-MCNC: 6.8 G/DL (ref 6.4–8.2)
RBC # BLD AUTO: 3.95 10*6/UL (ref 4.2–5.4)
RBC MORPH BLD: ABNORMAL
SMALL PLATELETS BLD QL SMEAR: ADEQUATE
SODIUM BLD-SCNC: 132 MMOL/L (ref 135–145)
TSH SERPL DL<=0.005 MIU/L-ACNC: 0.81 UIU/ML (ref 0.36–3.74)
WBC # BLD: 4 10*3/UL (ref 4.6–10.2)

## 2022-04-05 PROCEDURE — 1101F PR PT FALLS ASSESS DOC 0-1 FALLS W/OUT INJ PAST YR: ICD-10-PCS | Mod: CPTII,S$GLB,, | Performed by: NURSE PRACTITIONER

## 2022-04-05 PROCEDURE — 99214 PR OFFICE/OUTPT VISIT, EST, LEVL IV, 30-39 MIN: ICD-10-PCS | Mod: S$GLB,,, | Performed by: NURSE PRACTITIONER

## 2022-04-05 PROCEDURE — 1159F MED LIST DOCD IN RCRD: CPT | Mod: CPTII,S$GLB,, | Performed by: NURSE PRACTITIONER

## 2022-04-05 PROCEDURE — 3288F FALL RISK ASSESSMENT DOCD: CPT | Mod: CPTII,S$GLB,, | Performed by: NURSE PRACTITIONER

## 2022-04-05 PROCEDURE — 1125F AMNT PAIN NOTED PAIN PRSNT: CPT | Mod: CPTII,S$GLB,, | Performed by: NURSE PRACTITIONER

## 2022-04-05 PROCEDURE — 1160F PR REVIEW ALL MEDS BY PRESCRIBER/CLIN PHARMACIST DOCUMENTED: ICD-10-PCS | Mod: CPTII,S$GLB,, | Performed by: NURSE PRACTITIONER

## 2022-04-05 PROCEDURE — 1160F RVW MEDS BY RX/DR IN RCRD: CPT | Mod: CPTII,S$GLB,, | Performed by: NURSE PRACTITIONER

## 2022-04-05 PROCEDURE — 1159F PR MEDICATION LIST DOCUMENTED IN MEDICAL RECORD: ICD-10-PCS | Mod: CPTII,S$GLB,, | Performed by: NURSE PRACTITIONER

## 2022-04-05 PROCEDURE — 99214 OFFICE O/P EST MOD 30 MIN: CPT | Mod: S$GLB,,, | Performed by: NURSE PRACTITIONER

## 2022-04-05 PROCEDURE — 1101F PT FALLS ASSESS-DOCD LE1/YR: CPT | Mod: CPTII,S$GLB,, | Performed by: NURSE PRACTITIONER

## 2022-04-05 PROCEDURE — 1125F PR PAIN SEVERITY QUANTIFIED, PAIN PRESENT: ICD-10-PCS | Mod: CPTII,S$GLB,, | Performed by: NURSE PRACTITIONER

## 2022-04-05 PROCEDURE — 3288F PR FALLS RISK ASSESSMENT DOCUMENTED: ICD-10-PCS | Mod: CPTII,S$GLB,, | Performed by: NURSE PRACTITIONER

## 2022-04-05 RX ORDER — HYDROXYZINE HYDROCHLORIDE 25 MG/1
TABLET, FILM COATED ORAL
COMMUNITY
Start: 2022-02-19

## 2022-04-05 RX ORDER — SODIUM CHLORIDE 0.9 % (FLUSH) 0.9 %
10 SYRINGE (ML) INJECTION
Status: CANCELLED | OUTPATIENT
Start: 2022-04-07

## 2022-04-05 RX ORDER — VALSARTAN 160 MG/1
TABLET ORAL
COMMUNITY
Start: 2022-01-25

## 2022-04-05 RX ORDER — HEPARIN 100 UNIT/ML
500 SYRINGE INTRAVENOUS
Status: CANCELLED | OUTPATIENT
Start: 2022-04-07

## 2022-04-05 RX ORDER — FUROSEMIDE 20 MG/1
20 TABLET ORAL DAILY PRN
COMMUNITY
Start: 2021-12-06

## 2022-04-05 NOTE — PROGRESS NOTES
Hematology Oncology Progress Note    Hematology Oncology  [facility]     PATIENT: Sherice Haji  : 1932 89 y.o. female  MRN [id number]     PCP: Sejal Castillo MD  Consult Requested By:  No att. providers found    Date of Service: 2022    Subjective:   Interim History:  Malignant neoplasm of urinary bladder    The patient doing well      Oncology History:    Sherice Haji is here for to followup for bladder cancer.  Current therapy patient doing well without new complaints she specifically denies hematuria or weight loss she has a good appetite.  This is a 87 y.o. female patient with a history of The primary encounter diagnosis was Malignant 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. Diagnoses of Essential hypertension and Type 2 diabetes mellitus without complication, without long-term current use of insulin were also pertinent to this visit., who is referred here for evaluation treatment of bladder cancer.  The patient has been feeling weak and for T for about 2 months.  Urinalysis shows hematuria or and she was referred to Urology.  CT scan with IV contrast demonstrates a bladder mass. Biopsy shows muscle invasive bladder cancer.     The patient lost about 10 lb over the past 2 months.  She denies any abdominal pain chest pain short of breath.     ==2019 completed concurrent chemoradiation therapy as a bladder preservation  ==2020 Cyst NEO in bladder  ==2020 discussed a chemotherapy options such as cisplatin gemcitabine as a 20 % dose reduction; she declined chemotherapy  ==2020 recurrence muscle invasive bladder cancer on cystoscope  ==2020 we discussed options for her; for those chemotherapy with cisplatin gemcitabine versus immunotherapy with ago to induce local response and hopefully bladder preservation as a salvage; she prefer immunotherapy  == she was displaced by JANIE Aparicio  ==20  CT There is some wall thickening along the left anterior aspect of the  bladder unchanged. Constipation. Prior surgeries. Densely calcified coronary arteries.     ==11/2020 Keytruda restarted  ==5/2021 C scope  A scar as well as necrotic tissue identified in the left superior wall of the bladder consistent with the area of previous resection.  No evidence of recurrent disease.  Ureteral orifices of normal shape,  location and sizes effluxing clear urine.       Oncology History   Malignant neoplasm of urinary bladder   9/25/2019 Initial Diagnosis    Malignant 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     10/21/2019 Cancer Staged    Staging form: Urinary Bladder, AJCC 8th Edition  - Clinical: Stage II (cT2, cN0, cM0)     10/31/2019 - 12/11/2019 Chemotherapy    Treatment Summary   Plan Name: cisplatin taxol weekly  Treatment Goal: Curative  Status: Inactive  Start Date: 10/31/2019  End Date: 12/5/2019  Provider: Hoda Josue NP  Chemotherapy: CISplatin (PLATINOL) 20 mg/m2 = 34 mg in sodium chloride 0.9% 500 mL chemo infusion, 20 mg/m2 = 34 mg (100 % of original dose 20 mg/m2), Intravenous, Clinic/HOD 1 time, 6 of 6 cycles  Dose modification: 20 mg/m2 (original dose 20 mg/m2, Cycle 1)  PACLitaxel (TAXOL) 50 mg/m2 = 84 mg in sodium chloride 0.9% 500 mL chemo infusion, 50 mg/m2 = 84 mg (100 % of original dose 50 mg/m2), Intravenous, Clinic/HOD 1 time, 6 of 6 cycles  Dose modification: 50 mg/m2 (original dose 50 mg/m2, Cycle 1)     6/24/2020 - 6/24/2020 Chemotherapy    Treatment Summary   Plan Name: OP BLADDER GEMCITABINE CISPLATIN (SPLIT DOSE CISPLATIN) Q3W  Treatment Goal: Curative  Status: Inactive  Start Date:   End Date:   Provider: Misael Gordon MD  Chemotherapy: CISplatin (PLATINOL) 35 mg/m2 = 62 mg in sodium chloride 0.9% 500 mL chemo infusion, 35 mg/m2, Intravenous, Clinic/HOD 1 time, 0 of 4 cycles  gemcitabine (GEMZAR) 1,780 mg in sodium chloride 0.9% 250 mL chemo infusion, 1,000 mg/m2, Intravenous, Clinic/HOD 1 time, 0 of 4 cycles     11/11/2020 -  Chemotherapy    Treatment  "Summary   Plan Name: OP PEMBROLIZUMAB   Treatment Goal: Control  Status: Active  Start Date: 11/11/2020  End Date: 11/24/2022 (Planned)  Provider: Misael Gordon MD  Chemotherapy: pembrolizumab (KEYTRUDA) 200 mg in sodium chloride 0.9% 100 mL chemo infusion, 200 mg, Intravenous, Clinic/HOD 1 time, 21 of 34 cycles  Dose modification: 400 mg (original dose 200 mg, Cycle 15), 200 mg (original dose 200 mg, Cycle 15), 200 mg (original dose 200 mg, Cycle 16), 200 mg (original dose 200 mg, Cycle 17)         Oncologic History:      Oncologic History     Oncologic Treatment     Pathology      Past Medical History:   Past Medical History:   Diagnosis Date    Absolute anemia 12/5/2019    Allergy     Anxiety     Arthritis     Bladder cancer     Cancer     Cataract     Removed in May 2019    Depression     Diabetes mellitus     Hypertension     Lung cancer     Malignant 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 9/25/2019    Type 2 diabetes mellitus without complication, without long-term current use of insulin 9/25/2019       Past Surgical HIstory:   Past Surgical History:   Procedure Laterality Date    BLADDER SURGERY      surgery on July 22 2020 to see if patients cancer was still present    BLADDER TUMOR EXCISION  09/2019    CATARACT EXTRACTION, BILATERAL  05/2019       Allergies:  Review of patient's allergies indicates:   Allergen Reactions    Aspirin        Medications:  Current Outpatient Medications   Medication Sig Dispense Refill    BD ALCOHOL SWABS PadM       clopidogreL (PLAVIX) 75 mg tablet       DROPLET PEN NEEDLE 32 gauge x 5/32" Ndle       furosemide (LASIX) 20 MG tablet Take 20 mg by mouth daily as needed.      glimepiride (AMARYL) 2 MG tablet       hydrOXYzine HCL (ATARAX) 25 MG tablet       indapamide (LOZOL) 2.5 MG Tab       LANTUS SOLOSTAR U-100 INSULIN glargine 100 units/mL (3mL) SubQ pen       levothyroxine (SYNTHROID) 75 MCG tablet       loratadine (CLARITIN) 10 mg tablet  "      meloxicam (MOBIC) 7.5 MG tablet       metFORMIN (GLUCOPHAGE-XR) 500 MG 24 hr tablet       metoprolol tartrate (LOPRESSOR) 50 MG tablet       simvastatin (ZOCOR) 20 MG tablet       traZODone (DESYREL) 50 MG tablet       valsartan (DIOVAN) 160 MG tablet        No current facility-administered medications for this visit.       Family History:   Family History   Problem Relation Age of Onset    No Known Problems Mother     No Known Problems Father     Breast cancer Sister     Prostate cancer Brother     No Known Problems Maternal Aunt     No Known Problems Maternal Uncle     No Known Problems Paternal Aunt     No Known Problems Paternal Uncle     No Known Problems Maternal Grandmother     No Known Problems Maternal Grandfather     No Known Problems Paternal Grandmother     No Known Problems Paternal Grandfather     Breast cancer Sister     Prostate cancer Brother        Social History:  reports that she has never smoked. She has never used smokeless tobacco. She reports previous alcohol use. She reports that she does not use drugs.    Review of Systemas  Constitutional: No change in weight, appetie, fatigue, fever, or night sweats  Eyes: No changes in vision  Ears, Nose, Mouth, Throat, and Face: No hearing problems, ear pain, rummy nose, or sore throat  Respiratory: No shortness of breath or cough  Cardiovascular: No chest pain, palpitations or dizziness  Gastrointestinal: No abdominal pain, hematochezia, melena  Genitourinary: No dysuria, hematuria, nocturia, menstrual problems, post menopausal  Integumentary/Breast: No rashes or itching  Hematologic/Lymphatic: No anemia or bleeding abnormalities  Musculoskeletal: No joint or muscle abnormalities  Neurological: No sensory or motor problems, no headaches  Behavioral/Psych: No depression, anxiety, cognitive problems, or stress  Endocrine: No diabetes or thyroid problems  Allergic/Immunologic: See allergy above    Physical Exam      Vitals:  "  Vitals:    04/05/22 0959   BP: (!) 165/90   BP Location: Left arm   Patient Position: Sitting   BP Method: Medium (Automatic)   Pulse: 80   Resp: 18   Temp: 97.1 °F (36.2 °C)   TempSrc: Temporal   SpO2: 96%   Weight: 72.2 kg (159 lb 3.2 oz)   Height: 5' 3" (1.6 m)     BMI: Body mass index is 28.2 kg/m².  BSA Body surface area is 1.79 meters squared.  ECOG Performance Status:   ECOG SCORE        ECOG 1    GENERAL APPEARANCE: Well developed, well nourished, in no acute distress.  SKIN: Inspection of the skin reveals no rashes, ulcerations or petechiae.  HEENT: The sclerae were anicteric and conjunctivae were pink and moist. Extraocular movements were intact and pupils were equal, round, and reactive to light with normal accommodation. External inspection of the ears and nose showed no scars, lesions, or masses. Lips, teeth, and gums showed normal mucosa. The oral mucosa, hard and soft palate, tongue and posterior pharynx were normal.  NECK: Supple and symmetric. There was no thyroid enlargement, and no tenderness, or masses were felt.  CRESPIRATORY: Normal AP diameter and normal contour without any kyphoscoliosis. LUNGS: Auscultation of the lungs revealed normal breath sounds without any other adventitious sounds or rubs.  CARDIOVASCULAR: There was a regular rate and rhythm without any murmurs, gallops, rubs. The carotid pulses were normal and 2+ bilaterally without bruits. Peripheral pulses were 2+ and symmetric.  GASTROINTESTINAL: Soft and nontender with normal bowel sounds. The liver span was approximately 5-6 cm in the right midclavicular line by percussion. The liver edge was nontender. The spleen was not palpable. There were no inguinal or umbilical hernias noted. No ascites was noted.  LYMPH NODES: No lymphadenopathy was appreciated in the neck, axillae or groin.  MUSCULOSKELETAL: Gait was normal. There was no tenderness or effusions noted. Muscle strength and tone were normal. EXTREMITIES: No cyanosis, " clubbing or edema.  NEUROLOGIC: Alert and oriented x 3. Normal affect. Gait was normal. Normal deep tendon reflexes with no pathological reflexes. Sensation to touch was normal.  PSYCHIATRIC: good mood, orientated to place, time and person     Labs and Imagings     Lab Results   Component Value Date    WBC 4.0 (L) 04/05/2022    HGB 10.9 (L) 04/05/2022    HCT 33.2 (L) 04/05/2022    MCV 84.1 04/05/2022    LABPLAT 258 04/05/2022       CMP  Sodium   Date Value Ref Range Status   03/15/2022 139 135 - 145 mmol/L Final   10/14/2019 133 (L) 136 - 145 mmol/L Final     Potassium   Date Value Ref Range Status   03/15/2022 4.2 3.6 - 5.2 mmol/L Final   10/14/2019 4.4 3.5 - 5.1 mmol/L Final     Chloride   Date Value Ref Range Status   03/15/2022 102 100 - 108 mmol/L Final   10/14/2019 97 (L) 98 - 107 mmol/L Final     CO2   Date Value Ref Range Status   03/15/2022 31 21 - 32 mmol/L Final   10/14/2019 28 22 - 29 mmol/L Final     Glucose   Date Value Ref Range Status   03/15/2022 204 (H) 70 - 110 mg/dL Final     BUN   Date Value Ref Range Status   03/15/2022 16 7 - 18 mg/dL Final   10/14/2019 15.8 8 - 23 mg/dL Final     Creatinine   Date Value Ref Range Status   03/15/2022 0.97 0.55 - 1.02 mg/dL Final   10/14/2019 0.74 0.50 - 0.90 mg/dL Final     Calcium   Date Value Ref Range Status   03/15/2022 8.9 8.8 - 10.5 mg/dL Final   10/14/2019 9.9 8.6 - 10.2 mg/dL Final     Total Protein   Date Value Ref Range Status   03/15/2022 6.7 6.4 - 8.2 g/dL Final     Albumin   Date Value Ref Range Status   03/15/2022 3.4 3.4 - 5.0 g/dL Final   10/14/2019 4.3 3.5 - 5.2 g/dL Final     Total Bilirubin   Date Value Ref Range Status   03/15/2022 0.2 0.0 - 1.0 mg/dL Final     Alkaline Phosphatase   Date Value Ref Range Status   03/15/2022 62 46 - 116 U/L Final     AST   Date Value Ref Range Status   03/15/2022 10 (L) 15 - 37 U/L Final   10/14/2019 11 0 - 32 U/L Final     ALT   Date Value Ref Range Status   03/15/2022 13 12 - 78 U/L Final     Anion Gap    Date Value Ref Range Status   03/15/2022 6.0 3.0 - 11.0 mmol/L Final   10/14/2019 8.0 8.0 - 17.0 mmol/L Final     Comment:     NOTE  Testing performed at:  [facility], [address] [facility] [place]  [address] CLIA #:[id number]         All cell laboratory reviewed which shows white cell 1.7 and sodium 126  Imaging: CT C/A/P 21:  CT Abdomen Pelvis  Without Contrast                                RADIOLOGY REPORT        PT NAME: SANDOVAL GUZMÁN      [facility]     : 1932 F 89             [address]    ACCT: [id number]                                              [place]    Med Rec #: [id number]                                        [id number]    [facility]             Procedure: CT ABD   PELVIS WO CONTRAST    REQUISITION #: [id number]      REPORT #: [id number]           DATE OF EXAM: 21    TIME OF EXAM: 0845       CT ABD   PELVIS WO CONTRAST    CMS MANDATED QUALITY DATA CT RADIATION 436    *All CT scans at this facility uses dose modulation and/or weight-based   dosing when appropriate to reduce radiation dose to as low as reasonably   achievable.            CLINICAL HISTORY:    History of bladder cancer        TECHNIQUE:    Acquisition: Contiguous scan slices were obtained from the top of the   hemidiaphragm through the pelvis.    Reconstructions: Axial, coronal and sagittal reconstructions.         Contrast:    IV : None given.    Oral :  None administered.    Phases: Noncontrasted imaging.    Limitations: Evaluation of the abdominal viscera and gastrointestinal tract    is limited by the lack of contrast.    Estimated radiation dose: 14.86 mSv.        COMPARISON:    No prior relevant studies are available at this time.        FINDINGS:    Lower chest: Normal.        Abdomen:    Liver:  Normal.        Spleen: Normal.        Gallbladder and biliary tree: Normal.        Pancreas: Normal.        Adrenal glands: Normal.         Urinary tract:    Kidneys: Normal.    Pelvocalyceal systems: Normal. No hydronephrosis.    Ureters: Normal. No hydroureter        Retroperitoneum: Normal.        Mesentery and gastrointestinal tract: Normal.        Pelvis:    Urinary bladder: Normal.        Inguinal regions: Normal.        Vasculature:    1. Arterial calcification without aneurysm is present.        Osseous structures:    1. Degenerative changes noted throughout the spine with levoscoliosis.        Abdominal wall: Normal.        Additional findings: None seen.        IMPRESSION:        1.  No acute intra-abdominal pathology.            This document was created using a voice recognition transcribing system.   Incorrect words or phrases may have been missed during proof reading. Please   interpret accordingly or contact the radiologist for clarification if   necessary.                DICTATING PHYSICIAN:  LELIE RAMAN MD                   Date Dictated: 21        Signed By:  ELLIE RAMAN MD <Electronically signed by ELLIE RAMAN MD in OV>    Date Signed:  21     CC: DELMI ANTONIO MD ; DELMI ANTONIO MD      ADMITTING PHYSICIAN:                                                                                                    ORDERING PHY: DELMI ANTONIO MD                                                                                                                                                      ATTENDING PHY: DELMI ANTONIO MD    Patient Status:  REG CLI    Admit Service Date: 21       CT Chest Without Contrast                                RADIOLOGY REPORT        PT NAME: SANDOVAL GUZMÁN      [facility]     : 1932 F 89             [address]    ACCT: [id number]                                              [place] [facility] Rec #: [id number]                                        [id number]    [facility]             Procedure: CHEST  THORAX WO CONT    REQUISITION #: [id number]      REPORT #: [id number]           DATE OF EXAM: 12/28/21    TIME OF EXAM: 0900       CHEST THORAX WO CONT    CMS MANDATED QUALITY DATA CT RADIATION 436    *All CT scans at this facility uses dose modulation and/or weight-based   dosing when appropriate to reduce radiation dose to as low as reasonably   achievable.        Clinical history:    Bladder cancer        Technique:    Acquisition: Contiguous scan slices were obtained from the apex of the lungs   through the diaphragms.    Reconstructions: Axial, coronal and sagittal. reconstructions of the data   set were obtained.    Contrast:    IV: No contrast was administered.    Other: None administered.    Phases: Noncontrasted images.    Limitations: No technical limitations noted.    Estimated radiation dose: 14.86 mSv.        Comparison:    September 16, 2021        Findings:    Lungs, pleura and large airways:    1. Several small nodules are present in the lungs most notably in the right    middle lobe region. These appear to be stable for size and configuration.   Please continue to be approximately 5-6 mm in size. These appear to be   stable when compared to the previous exam. No new lung nodules are   demonstrated.        Heart:    1 coronary artery calcifications are noted.        Systemic vasculature: Normal.        Pulmonary vasculature: Normal.        Mediastinal and hilar structures: Normal.        Chest wall, axilla and lower neck: Normal.        Upper abdomen: Please see the CT of the abdomen report        Osseous structures:    1. Degenerative changes are noted throughout the spine.        Additional findings: None seen.        Impression        1.  Multiple small pulmonary nodules remain present bilaterally. These   appear to be stable for size and number.            This document was created using a voice recognition transcribing system.   Incorrect words or phrases may have been missed during proof  reading. Please   interpret accordingly or contact the radiologist for clarification if   necessary.        DICTATING PHYSICIAN:  [place] RAMAN MD                   Date Dictated: 12/28/21 0934        Signed By:  ELLIE RAMAN MD <Electronically signed by ELLIE RAMAN MD in OV>    Date Signed:  12/28/21 0939     CC: DELMI ANTONIO MD ; DELMI ANTONIO MD      ADMITTING PHYSICIAN:                                                                                                    ORDERING PHY: DELMI ANTONIO MD                                                                                                                                                      ATTENDING PHY: DELMI ANTONIO MD    Patient Status:  REG CLI    Admit Service Date: 12/28/21                   Assessment:     Principle Problem: No primary diagnosis found.   Co-morbidity/Active Problems:   Patient Active Problem List   Diagnosis    Malignant neoplasm of urinary bladder    Essential hypertension    Type 2 diabetes mellitus without complication, without long-term current use of insulin    Encounter for antineoplastic chemotherapy    Chemotherapy-induced nausea    Chemotherapy-induced neutropenia    Absolute anemia    Encounter for immunotherapy    Malignant neoplasm metastatic to both lungs    Elevated blood sugar        Sherice Haji is a 89 y.o. female with PMH of There were no encounter diagnoses., with No primary diagnosis found.   Sherice Haji is a 87 y.o. 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 [C67.3]   87-year-old female otherwise healthy except hypertension diabetes present with bladder mass biopsy-proven muscle  invasive bladder cancer( stage II) and     biopsy-proven muscle invasive bladder cancer( stage II) recurrence  Normal renal function  Neutropenia  Status post chemo radiation therapy December 2019  recurrent muscle invasive bladder cance  3/17/21Pulm nodules noted on CT Chest     Plan:  Status post   Overall Plan:  Concurrent chemoradiation therapy, followed by optional cystectomy and full dose chemo C/G; patient does not want chemotherapy so the other option is immunotherapy  12/2/21:  Patient continues to tolerate Keytruda without any complaint.  her elevated blood sugars are being followed by her PCP   ==CT chest  scan reviewed will request comparison to CT Chest done 3/21 at [facility]  ==RTC in 3 weeks with labs   12/22/21: patient continues to tolerate tx well but some fatigue noted, did not get ct scans done, will help schedule scans prior to next appt. Labs reviewed and pt cleared for tx.  Pt recently moved into [facility] last month.   1/11/22:  Recent CT scans show stable pulmonary nodules, currently no evidence of disease in abdomen and pelvis.  Patient continues to tolerate immunotherapy very well, labs reviewed and patient will continue with current therapy as planned.  She denies any diarrhea, cough, rash or itching  4/5/22:  She continues to tolerate treatment well, does describes some occasional urinary incontinence over the last several months.  Patient complains of bilateral feet pain states she has been diagnosed with bone spurs in the past.  Advised patient to reach out to her primary care for referral to podiatry for evaluation.  Labs are reviewed and patient is cleared for treatment as planned on Thursday.  Plan is to repeat CT scans prior to next treatment            -Total time spent in counseling and discussion about further management options including relevant lab work, treatment,  prognosis, medications and intended side effects was more than 25 minutes. More than 50% of the  time was spent on counseling and coordination of care.  This includes face to face time and non-face to face time preparing to see the patient (eg, review of tests), Obtaining and/or reviewing separately obtained history, Documenting clinical information in the electronic or other health record, Independently interpreting resultsand communicating results to the patient/family/caregiver, or Care coordination.

## 2022-04-26 ENCOUNTER — OFFICE VISIT (OUTPATIENT)
Dept: HEMATOLOGY/ONCOLOGY | Facility: CLINIC | Age: 87
End: 2022-04-26
Payer: COMMERCIAL

## 2022-04-26 ENCOUNTER — CLINICAL SUPPORT (OUTPATIENT)
Dept: HEMATOLOGY/ONCOLOGY | Facility: CLINIC | Age: 87
End: 2022-04-26
Payer: COMMERCIAL

## 2022-04-26 VITALS
OXYGEN SATURATION: 95 % | DIASTOLIC BLOOD PRESSURE: 83 MMHG | BODY MASS INDEX: 28.02 KG/M2 | WEIGHT: 158.13 LBS | HEIGHT: 63 IN | RESPIRATION RATE: 18 BRPM | TEMPERATURE: 98 F | SYSTOLIC BLOOD PRESSURE: 159 MMHG | HEART RATE: 89 BPM

## 2022-04-26 DIAGNOSIS — Z29.89 ENCOUNTER FOR IMMUNOTHERAPY: ICD-10-CM

## 2022-04-26 DIAGNOSIS — C67.9 MALIGNANT NEOPLASM OF URINARY BLADDER, UNSPECIFIED SITE: ICD-10-CM

## 2022-04-26 DIAGNOSIS — E03.9 HYPOTHYROIDISM, UNSPECIFIED TYPE: ICD-10-CM

## 2022-04-26 DIAGNOSIS — C78.02 MALIGNANT NEOPLASM METASTATIC TO BOTH LUNGS: ICD-10-CM

## 2022-04-26 DIAGNOSIS — C78.01 MALIGNANT NEOPLASM METASTATIC TO BOTH LUNGS: ICD-10-CM

## 2022-04-26 DIAGNOSIS — C67.9 MALIGNANT NEOPLASM OF URINARY BLADDER, UNSPECIFIED SITE: Primary | ICD-10-CM

## 2022-04-26 LAB
ALBUMIN SERPL BCP-MCNC: 3.4 G/DL (ref 3.4–5)
ALBUMIN/GLOBULIN RATIO: 1.1 RATIO (ref 1.1–1.8)
ALP SERPL-CCNC: 56 U/L (ref 46–116)
ALT SERPL W P-5'-P-CCNC: 16 U/L (ref 12–78)
ANION GAP SERPL CALC-SCNC: 6 MMOL/L (ref 3–11)
AST SERPL-CCNC: 13 U/L (ref 15–37)
BASOPHILS NFR BLD: 2 % (ref 0–3)
BILIRUB SERPL-MCNC: 0.2 MG/DL (ref 0–1)
BUN SERPL-MCNC: 15 MG/DL (ref 7–18)
BUN/CREAT SERPL: 13.76 RATIO (ref 7–18)
CALCIUM SERPL-MCNC: 9.4 MG/DL (ref 8.8–10.5)
CELLS COUNTED: 100
CHLORIDE SERPL-SCNC: 101 MMOL/L (ref 100–108)
CO2 SERPL-SCNC: 29 MMOL/L (ref 21–32)
CREAT SERPL-MCNC: 1.09 MG/DL (ref 0.55–1.02)
EOSINOPHIL NFR BLD: 5 % (ref 1–3)
ERYTHROCYTE [DISTWIDTH] IN BLOOD BY AUTOMATED COUNT: 14.8 % (ref 12.5–18)
GFR ESTIMATION: 57
GLOBULIN: 3.1 G/DL (ref 2.3–3.5)
GLUCOSE SERPL-MCNC: 181 MG/DL (ref 70–110)
HCT VFR BLD AUTO: 32.9 % (ref 37–47)
HGB BLD-MCNC: 10.5 G/DL (ref 12–16)
LYMPHOCYTES NFR BLD: 13 % (ref 25–40)
MCH RBC QN AUTO: 27.2 PG (ref 27–31.2)
MCHC RBC AUTO-ENTMCNC: 31.9 G/DL (ref 31.8–35.4)
MCV RBC AUTO: 85.2 FL (ref 80–97)
METAMYELOCYTES # BLD MANUAL: 1 % (ref 0–0)
MONOCYTES NFR BLD: 22 % (ref 1–15)
NEUTROPHILS # BLD AUTO: 1.7 10*3/UL (ref 1.8–7.7)
NEUTROPHILS NFR BLD: 57 % (ref 37–80)
NUCLEATED RED BLOOD CELLS: 0 %
PLATELETS: 222 10*3/UL (ref 142–424)
POTASSIUM SERPL-SCNC: 4.5 MMOL/L (ref 3.6–5.2)
PROT SERPL-MCNC: 6.5 G/DL (ref 6.4–8.2)
RBC # BLD AUTO: 3.86 10*6/UL (ref 4.2–5.4)
RBC MORPH BLD: ABNORMAL
SMALL PLATELETS BLD QL SMEAR: ADEQUATE
SODIUM BLD-SCNC: 136 MMOL/L (ref 135–145)
TSH SERPL DL<=0.005 MIU/L-ACNC: 0.85 UIU/ML (ref 0.36–3.74)
WBC # BLD: 3 10*3/UL (ref 4.6–10.2)

## 2022-04-26 PROCEDURE — 1126F PR PAIN SEVERITY QUANTIFIED, NO PAIN PRESENT: ICD-10-PCS | Mod: CPTII,S$GLB,, | Performed by: NURSE PRACTITIONER

## 2022-04-26 PROCEDURE — 1159F MED LIST DOCD IN RCRD: CPT | Mod: CPTII,S$GLB,, | Performed by: NURSE PRACTITIONER

## 2022-04-26 PROCEDURE — 1160F PR REVIEW ALL MEDS BY PRESCRIBER/CLIN PHARMACIST DOCUMENTED: ICD-10-PCS | Mod: CPTII,S$GLB,, | Performed by: NURSE PRACTITIONER

## 2022-04-26 PROCEDURE — 3288F PR FALLS RISK ASSESSMENT DOCUMENTED: ICD-10-PCS | Mod: CPTII,S$GLB,, | Performed by: NURSE PRACTITIONER

## 2022-04-26 PROCEDURE — 1159F PR MEDICATION LIST DOCUMENTED IN MEDICAL RECORD: ICD-10-PCS | Mod: CPTII,S$GLB,, | Performed by: NURSE PRACTITIONER

## 2022-04-26 PROCEDURE — 1160F RVW MEDS BY RX/DR IN RCRD: CPT | Mod: CPTII,S$GLB,, | Performed by: NURSE PRACTITIONER

## 2022-04-26 PROCEDURE — 99214 OFFICE O/P EST MOD 30 MIN: CPT | Mod: S$GLB,,, | Performed by: NURSE PRACTITIONER

## 2022-04-26 PROCEDURE — 3288F FALL RISK ASSESSMENT DOCD: CPT | Mod: CPTII,S$GLB,, | Performed by: NURSE PRACTITIONER

## 2022-04-26 PROCEDURE — 1126F AMNT PAIN NOTED NONE PRSNT: CPT | Mod: CPTII,S$GLB,, | Performed by: NURSE PRACTITIONER

## 2022-04-26 PROCEDURE — 1101F PT FALLS ASSESS-DOCD LE1/YR: CPT | Mod: CPTII,S$GLB,, | Performed by: NURSE PRACTITIONER

## 2022-04-26 PROCEDURE — 99214 PR OFFICE/OUTPT VISIT, EST, LEVL IV, 30-39 MIN: ICD-10-PCS | Mod: S$GLB,,, | Performed by: NURSE PRACTITIONER

## 2022-04-26 PROCEDURE — 1101F PR PT FALLS ASSESS DOC 0-1 FALLS W/OUT INJ PAST YR: ICD-10-PCS | Mod: CPTII,S$GLB,, | Performed by: NURSE PRACTITIONER

## 2022-04-26 RX ORDER — HEPARIN 100 UNIT/ML
500 SYRINGE INTRAVENOUS
Status: CANCELLED | OUTPATIENT
Start: 2022-04-28

## 2022-04-26 RX ORDER — SODIUM CHLORIDE 0.9 % (FLUSH) 0.9 %
10 SYRINGE (ML) INJECTION
Status: CANCELLED | OUTPATIENT
Start: 2022-04-28

## 2022-04-26 NOTE — PROGRESS NOTES
Hematology Oncology Progress Note    Hematology Oncology  [facility]     PATIENT: Sherice Haji  : 1932 89 y.o. female  MRN [id number]     PCP: Sejal Castillo MD  Consult Requested By:  No att. providers found    Date of Service: 2022    Subjective:   Interim History:  Malignant neoplasm of urinary bladder    The patient doing well      Oncology History:    Sherice Haji is here for to followup for bladder cancer.  Current therapy patient doing well without new complaints she specifically denies hematuria or weight loss she has a good appetite.  This is a 87 y.o. female patient with a history of The primary encounter diagnosis was Malignant 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. Diagnoses of Essential hypertension and Type 2 diabetes mellitus without complication, without long-term current use of insulin were also pertinent to this visit., who is referred here for evaluation treatment of bladder cancer.  The patient has been feeling weak and for T for about 2 months.  Urinalysis shows hematuria or and she was referred to Urology.  CT scan with IV contrast demonstrates a bladder mass. Biopsy shows muscle invasive bladder cancer.     The patient lost about 10 lb over the past 2 months.  She denies any abdominal pain chest pain short of breath.     ==2019 completed concurrent chemoradiation therapy as a bladder preservation  ==2020 Cyst NEO in bladder  ==2020 discussed a chemotherapy options such as cisplatin gemcitabine as a 20 % dose reduction; she declined chemotherapy  ==2020 recurrence muscle invasive bladder cancer on cystoscope  ==2020 we discussed options for her; for those chemotherapy with cisplatin gemcitabine versus immunotherapy with ago to induce local response and hopefully bladder preservation as a salvage; she prefer immunotherapy  == she was displaced by JANIE Aparicio  ==20  CT There is some wall thickening along the left anterior aspect of  the bladder unchanged. Constipation. Prior surgeries. Densely calcified coronary arteries.     ==11/2020 Keytruda restarted  ==5/2021 C scope  A scar as well as necrotic tissue identified in the left superior wall of the bladder consistent with the area of previous resection.  No evidence of recurrent disease.  Ureteral orifices of normal shape,  location and sizes effluxing clear urine.       Oncology History   Malignant neoplasm of urinary bladder   9/25/2019 Initial Diagnosis    Malignant 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     10/21/2019 Cancer Staged    Staging form: Urinary Bladder, AJCC 8th Edition  - Clinical: Stage II (cT2, cN0, cM0)     10/31/2019 - 12/11/2019 Chemotherapy    Treatment Summary   Plan Name: cisplatin taxol weekly  Treatment Goal: Curative  Status: Inactive  Start Date: 10/31/2019  End Date: 12/5/2019  Provider: Hoda Josue NP  Chemotherapy: CISplatin (PLATINOL) 20 mg/m2 = 34 mg in sodium chloride 0.9% 500 mL chemo infusion, 20 mg/m2 = 34 mg (100 % of original dose 20 mg/m2), Intravenous, Clinic/HOD 1 time, 6 of 6 cycles  Dose modification: 20 mg/m2 (original dose 20 mg/m2, Cycle 1)  PACLitaxel (TAXOL) 50 mg/m2 = 84 mg in sodium chloride 0.9% 500 mL chemo infusion, 50 mg/m2 = 84 mg (100 % of original dose 50 mg/m2), Intravenous, Clinic/HOD 1 time, 6 of 6 cycles  Dose modification: 50 mg/m2 (original dose 50 mg/m2, Cycle 1)     6/24/2020 - 6/24/2020 Chemotherapy    Treatment Summary   Plan Name: OP BLADDER GEMCITABINE CISPLATIN (SPLIT DOSE CISPLATIN) Q3W  Treatment Goal: Curative  Status: Inactive  Start Date:   End Date:   Provider: Misael Gordon MD  Chemotherapy: CISplatin (PLATINOL) 35 mg/m2 = 62 mg in sodium chloride 0.9% 500 mL chemo infusion, 35 mg/m2, Intravenous, Clinic/HOD 1 time, 0 of 4 cycles  gemcitabine (GEMZAR) 1,780 mg in sodium chloride 0.9% 250 mL chemo infusion, 1,000 mg/m2, Intravenous, Clinic/HOD 1 time, 0 of 4 cycles     11/11/2020 -  Chemotherapy    Treatment  "Summary   Plan Name: OP PEMBROLIZUMAB   Treatment Goal: Control  Status: Active  Start Date: 11/11/2020  End Date: 11/24/2022 (Planned)  Provider: Misael Gordon MD  Chemotherapy: pembrolizumab (KEYTRUDA) 200 mg in sodium chloride 0.9% 100 mL chemo infusion, 200 mg, Intravenous, Clinic/HOD 1 time, 23 of 34 cycles  Dose modification: 400 mg (original dose 200 mg, Cycle 15), 200 mg (original dose 200 mg, Cycle 15), 200 mg (original dose 200 mg, Cycle 16), 200 mg (original dose 200 mg, Cycle 17)         Oncologic History:      Oncologic History     Oncologic Treatment     Pathology      Past Medical History:   Past Medical History:   Diagnosis Date    Absolute anemia 12/5/2019    Allergy     Anxiety     Arthritis     Bladder cancer     Cancer     Cataract     Removed in May 2019    Depression     Diabetes mellitus     Hypertension     Lung cancer     Malignant 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 9/25/2019    Type 2 diabetes mellitus without complication, without long-term current use of insulin 9/25/2019       Past Surgical HIstory:   Past Surgical History:   Procedure Laterality Date    BLADDER SURGERY      surgery on July 22 2020 to see if patients cancer was still present    BLADDER TUMOR EXCISION  09/2019    CATARACT EXTRACTION, BILATERAL  05/2019       Allergies:  Review of patient's allergies indicates:   Allergen Reactions    Aspirin        Medications:  Current Outpatient Medications   Medication Sig Dispense Refill    BD ALCOHOL SWABS PadM       clopidogreL (PLAVIX) 75 mg tablet       DROPLET PEN NEEDLE 32 gauge x 5/32" Ndle       furosemide (LASIX) 20 MG tablet Take 20 mg by mouth daily as needed.      glimepiride (AMARYL) 2 MG tablet       hydrOXYzine HCL (ATARAX) 25 MG tablet       indapamide (LOZOL) 2.5 MG Tab       LANTUS SOLOSTAR U-100 INSULIN glargine 100 units/mL (3mL) SubQ pen       levothyroxine (SYNTHROID) 75 MCG tablet       loratadine (CLARITIN) 10 mg tablet  "      meloxicam (MOBIC) 7.5 MG tablet       metFORMIN (GLUCOPHAGE-XR) 500 MG 24 hr tablet       traZODone (DESYREL) 50 MG tablet       valsartan (DIOVAN) 160 MG tablet       metoprolol tartrate (LOPRESSOR) 50 MG tablet       simvastatin (ZOCOR) 20 MG tablet        No current facility-administered medications for this visit.       Family History:   Family History   Problem Relation Age of Onset    No Known Problems Mother     No Known Problems Father     Breast cancer Sister     Prostate cancer Brother     No Known Problems Maternal Aunt     No Known Problems Maternal Uncle     No Known Problems Paternal Aunt     No Known Problems Paternal Uncle     No Known Problems Maternal Grandmother     No Known Problems Maternal Grandfather     No Known Problems Paternal Grandmother     No Known Problems Paternal Grandfather     Breast cancer Sister     Prostate cancer Brother        Social History:  reports that she has never smoked. She has never used smokeless tobacco. She reports previous alcohol use. She reports that she does not use drugs.    Review of Systemas  Constitutional: No change in weight, appetie, fatigue, fever, or night sweats  Eyes: No changes in vision  Ears, Nose, Mouth, Throat, and Face: No hearing problems, ear pain, rummy nose, or sore throat  Respiratory: No shortness of breath or cough  Cardiovascular: No chest pain, palpitations or dizziness  Gastrointestinal: No abdominal pain, hematochezia, melena  Genitourinary: No dysuria, hematuria, nocturia, menstrual problems, post menopausal  Integumentary/Breast: No rashes or itching  Hematologic/Lymphatic: No anemia or bleeding abnormalities  Musculoskeletal: No joint or muscle abnormalities  Neurological: No sensory or motor problems, no headaches  Behavioral/Psych: No depression, anxiety, cognitive problems, or stress  Endocrine: No diabetes or thyroid problems  Allergic/Immunologic: See allergy above    Physical Exam      Vitals:  "  Vitals:    04/26/22 0928   BP: (!) 159/83   BP Location: Left arm   Patient Position: Sitting   BP Method: Medium (Automatic)   Pulse: 89   Resp: 18   Temp: 97.6 °F (36.4 °C)   TempSrc: Temporal   SpO2: 95%   Weight: 71.7 kg (158 lb 1.6 oz)   Height: 5' 3" (1.6 m)     BMI: Body mass index is 28.01 kg/m².  BSA Body surface area is 1.79 meters squared.  ECOG Performance Status:   ECOG SCORE        ECOG 1    GENERAL APPEARANCE: Well developed, well nourished, in no acute distress.  SKIN: Inspection of the skin reveals no rashes, ulcerations or petechiae.  HEENT: The sclerae were anicteric and conjunctivae were pink and moist. Extraocular movements were intact and pupils were equal, round, and reactive to light with normal accommodation. External inspection of the ears and nose showed no scars, lesions, or masses. Lips, teeth, and gums showed normal mucosa. The oral mucosa, hard and soft palate, tongue and posterior pharynx were normal.  NECK: Supple and symmetric. There was no thyroid enlargement, and no tenderness, or masses were felt.  CRESPIRATORY: Normal AP diameter and normal contour without any kyphoscoliosis. LUNGS: Auscultation of the lungs revealed normal breath sounds without any other adventitious sounds or rubs.  CARDIOVASCULAR: There was a regular rate and rhythm without any murmurs, gallops, rubs. The carotid pulses were normal and 2+ bilaterally without bruits. Peripheral pulses were 2+ and symmetric.  GASTROINTESTINAL: Soft and nontender with normal bowel sounds. The liver span was approximately 5-6 cm in the right midclavicular line by percussion. The liver edge was nontender. The spleen was not palpable. There were no inguinal or umbilical hernias noted. No ascites was noted.  LYMPH NODES: No lymphadenopathy was appreciated in the neck, axillae or groin.  MUSCULOSKELETAL: Gait was normal. There was no tenderness or effusions noted. Muscle strength and tone were normal. EXTREMITIES: No cyanosis, " clubbing or edema.  NEUROLOGIC: Alert and oriented x 3. Normal affect. Gait was normal. Normal deep tendon reflexes with no pathological reflexes. Sensation to touch was normal.  PSYCHIATRIC: good mood, orientated to place, time and person     Labs and Imagings     Lab Results   Component Value Date    WBC 3.0 (L) 04/26/2022    HGB 10.5 (L) 04/26/2022    HCT 32.9 (L) 04/26/2022    MCV 85.2 04/26/2022    LABPLAT 222 04/26/2022       CMP  Sodium   Date Value Ref Range Status   04/26/2022 136 135 - 145 mmol/L Final   10/14/2019 133 (L) 136 - 145 mmol/L Final     Potassium   Date Value Ref Range Status   04/26/2022 4.5 3.6 - 5.2 mmol/L Final   10/14/2019 4.4 3.5 - 5.1 mmol/L Final     Chloride   Date Value Ref Range Status   04/26/2022 101 100 - 108 mmol/L Final   10/14/2019 97 (L) 98 - 107 mmol/L Final     CO2   Date Value Ref Range Status   04/26/2022 29 21 - 32 mmol/L Final   10/14/2019 28 22 - 29 mmol/L Final     Glucose   Date Value Ref Range Status   04/26/2022 181 (H) 70 - 110 mg/dL Final     BUN   Date Value Ref Range Status   04/26/2022 15 7 - 18 mg/dL Final   10/14/2019 15.8 8 - 23 mg/dL Final     Creatinine   Date Value Ref Range Status   04/26/2022 1.09 (H) 0.55 - 1.02 mg/dL Final   10/14/2019 0.74 0.50 - 0.90 mg/dL Final     Calcium   Date Value Ref Range Status   04/26/2022 9.4 8.8 - 10.5 mg/dL Final   10/14/2019 9.9 8.6 - 10.2 mg/dL Final     Total Protein   Date Value Ref Range Status   04/26/2022 6.5 6.4 - 8.2 g/dL Final     Albumin   Date Value Ref Range Status   04/26/2022 3.4 3.4 - 5.0 g/dL Final   10/14/2019 4.3 3.5 - 5.2 g/dL Final     Total Bilirubin   Date Value Ref Range Status   04/26/2022 0.2 0.0 - 1.0 mg/dL Final     Alkaline Phosphatase   Date Value Ref Range Status   04/26/2022 56 46 - 116 U/L Final     AST   Date Value Ref Range Status   04/26/2022 13 (L) 15 - 37 U/L Final   10/14/2019 11 0 - 32 U/L Final     ALT   Date Value Ref Range Status   04/26/2022 16 12 - 78 U/L Final     Anion Gap    Date Value Ref Range Status   2022 6.0 3.0 - 11.0 mmol/L Final   10/14/2019 8.0 8.0 - 17.0 mmol/L Final     Comment:     NOTE  Testing performed at:  [facility], [address] [place]  [address] CLIA #:[id number]         All cell laboratory reviewed which shows white cell 1.7 and sodium 126  Imaging: CT C/A/P 21:  [facility] UNKNOWN RAD EAP                                RADIOLOGY REPORT        PT NAME: SANDOVAL GUZMÁN      [facility]     : 1932 F 89             [address]    ACCT: [id number]                                              [place], [place]    Med Rec #: [id number]                                        [id number]    [facility]             Procedure: CHEST THORAX  WO/W CONT    REQUISITION #: [id number]      REPORT #: [id number]           DATE OF EXAM: 22    TIME OF EXAM: 0800       CMS MANDATED QUALITY DATA - CT RADIATION - 436        All CT scans at this facility use dose modulation, iterative-reconstruction,   and/or weight-based dosing when appropriate to reduce radiation dose to as   low as reasonably achievable.        CT CHEST/ABDOMEN/PELVIS WITHOUT AND WITH CONTRAST        HISTORY:  Bladder cancer.        COMPARISON:  2021.        TECHNIQUE: Contiguous axial images are acquired through the chest, abdomen,    and pelvis from the thoracic inlet through the pelvic inlet. 100 cc Isovue   300 intravenous contrast was administered prior to image acquisition. The   data set was reconstructed at 5 mm axial, coronal, and sagittal intervals.   The estimated patient radiation dose for this examination is 30.15 mSv.        FINDINGS:        CHEST:        Shotty mediastinal lymph nodes. However, none are enlarged by cross-  sectional imaging criteria.  The heart is enlarged. Atherosclerotic   calcifications of the aorta and its branch vessels including the coronary   arteries.    Multiple pulmonary nodules within  both lungs measuring up to 6 mm in the   right middle lobe and 5 mm in the left lower lobe.  Mild bronchiectasis   within the lower lungs. Nonspecific groundglass opacities within the lower   lungs. No pleural fluid or pneumothorax.        Small hiatal hernia.        ABDOMEN AND PELVIS:        Scattered calcifications within the right and left hepatic lobes.    The spleen is unremarkable    The bilateral adrenal glands and pancreas are unremarkable.        The gallbladder is unremarkable.        The kidneys are symmetric in size. Low-density lesions within the kidneys   are too small to fully characterize the likely simple cysts. No   hydronephrosis or hydroureter.  No perinephric fat stranding.        No dilatation of the large or small bowel.    No free air or free fluid.  No lymphadenopathy within the abdomen or pelvis.        Atherosclerotic calcifications of the aorta and its branch vessels.        The uterus has been removed. Pelvic floor prolapse.        The urinary bladder is unremarkable.        No aggressive sclerotic or lytic osseous lesion.            IMPRESSION:        Urinary bladder is unremarkable.        Multiple pulmonary nodules within both lungs. These are not significantly   changed when compared to the prior examination.            DICTATING PHYSICIAN:  CHA PAGE MD                   Date Dictated: 04/14/22 0848        Signed By:  CHA PAGE MD <Electronically signed by CHA PAGE MD in    OV>    Date Signed:  04/14/22 0915     CC: IVON SIERRA NP ; IVON SIERRA NP      ADMITTING PHYSICIAN:                                                                                                    ORDERING PHY: IVON SIERRA NP                                                                                                                                                      ATTENDING PHY: IVON SIERRA NP    Patient Status:  REG CLI    Admit Service Date: 04/14/22       CT Abdomen Pelvis   Without Contrast                                RADIOLOGY REPORT        PT NAME: SANDOVAL GUZMÁN      [facility]     : 1932 F 89             [address]    ACCT: [id number]                                              [place], [place]    [facility] Rec #: [id number]                                        [id number]    [facility]             Procedure: CT ABD   PELVIS WO/W CONTRAST    REQUISITION #: [id number]      REPORT #: [id number]           DATE OF EXAM: 22    TIME OF EXAM: 0745       CMS MANDATED QUALITY DATA - CT RADIATION - 436        All CT scans at this facility use dose modulation, iterative-reconstruction,   and/or weight-based dosing when appropriate to reduce radiation dose to as   low as reasonably achievable.        CT CHEST/ABDOMEN/PELVIS WITHOUT AND WITH CONTRAST        HISTORY:  Bladder cancer.        COMPARISON:  2021.        TECHNIQUE: Contiguous axial images are acquired through the chest, abdomen,    and pelvis from the thoracic inlet through the pelvic inlet. 100 cc Isovue   300 intravenous contrast was administered prior to image acquisition. The   data set was reconstructed at 5 mm axial, coronal, and sagittal intervals.   The estimated patient radiation dose for this examination is 30.15 mSv.        FINDINGS:        CHEST:        Shotty mediastinal lymph nodes. However, none are enlarged by cross-  sectional imaging criteria.  The heart is enlarged. Atherosclerotic   calcifications of the aorta and its branch vessels including the coronary   arteries.    Multiple pulmonary nodules within both lungs measuring up to 6 mm in the   right middle lobe and 5 mm in the left lower lobe.  Mild bronchiectasis   within the lower lungs. Nonspecific groundglass opacities within the lower   lungs. No pleural fluid or pneumothorax.        Small hiatal hernia.        ABDOMEN AND PELVIS:        Scattered calcifications within the right and left  hepatic lobes.    The spleen is unremarkable    The bilateral adrenal glands and pancreas are unremarkable.        The gallbladder is unremarkable.        The kidneys are symmetric in size. Low-density lesions within the kidneys   are too small to fully characterize the likely simple cysts. No   hydronephrosis or hydroureter.  No perinephric fat stranding.        No dilatation of the large or small bowel.    No free air or free fluid.  No lymphadenopathy within the abdomen or pelvis.        Atherosclerotic calcifications of the aorta and its branch vessels.        The uterus has been removed. Pelvic floor prolapse.        The urinary bladder is unremarkable.        No aggressive sclerotic or lytic osseous lesion.            IMPRESSION:        Urinary bladder is unremarkable.        Multiple pulmonary nodules within both lungs. These are not significantly   changed when compared to the prior examination.            DICTATING PHYSICIAN:  CHA PAGE MD                   Date Dictated: 04/14/22 0848        Signed By:  CHA PAGE MD <Electronically signed by CHA PAGE MD in    OV>    Date Signed:  04/14/22 0915     CC: IVON SIERRA NP ; IVON SIERRA NP      ADMITTING PHYSICIAN:                                                                                                    ORDERING PHY: IVON SIERRA NP                                                                                                                                                      ATTENDING PHY: IVON SIERRA NP    Patient Status:  REG CLI    Admit Service Date: 04/14/22                   Assessment:     Principle Problem: No primary diagnosis found.   Co-morbidity/Active Problems:   Patient Active Problem List   Diagnosis    Malignant neoplasm of urinary bladder    Essential hypertension    Type 2 diabetes mellitus without complication, without long-term current use of insulin    Encounter for antineoplastic chemotherapy     Chemotherapy-induced nausea    Chemotherapy-induced neutropenia    Absolute anemia    Encounter for immunotherapy    Malignant neoplasm metastatic to both lungs    Elevated blood sugar        Sherice Haji is a 89 y.o. female with PMH of There were no encounter diagnoses., with No primary diagnosis found.   Sherice Haji is a 87 y.o. 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 [C67.3]   87-year-old female otherwise healthy except hypertension diabetes present with bladder mass biopsy-proven muscle invasive bladder cancer( stage II) and     biopsy-proven muscle invasive bladder cancer( stage II) recurrence  Normal renal function  Neutropenia  Status post chemo radiation therapy December 2019  recurrent muscle invasive bladder cance  3/17/21Pulm nodules noted on CT Chest     Plan:  Status post   Overall Plan:  Concurrent chemoradiation therapy, followed by optional cystectomy and full dose chemo C/G; patient does not want chemotherapy so the other option is immunotherapy  12/2/21:  Patient continues to tolerate Keytruda without any complaint.  her elevated blood sugars are being followed by her PCP   ==CT chest  scan reviewed will request comparison to CT Chest done 3/21 at [facility]  ==RTC in 3 weeks with labs   12/22/21: patient continues to tolerate tx well but some fatigue noted, did not get ct scans done, will help schedule scans prior to next appt. Labs reviewed and pt cleared for tx.  Pt recently moved into [facility] last month.   1/11/22:  Recent CT scans show stable pulmonary nodules, currently no evidence of disease in abdomen and pelvis.  Patient continues to tolerate immunotherapy very well,  labs reviewed and patient will continue with current therapy as planned.  She denies any diarrhea, cough, rash or itching  4/5/22:  She continues to tolerate treatment well, does describes some occasional urinary incontinence over the last several months.  Patient complains of bilateral feet pain states she has been diagnosed with bone spurs in the past.  Advised patient to reach out to her primary care for referral to podiatry for evaluation.  Labs are reviewed and patient is cleared for treatment as planned on Thursday.  Plan is to repeat CT scans prior to next treatment  4/26/22:  Patient and son here in clinic today to review recent CT scans showing multiple stable pulmonary nodules measuring 5-6 mm in size.  Patient has completed approximately 18 months of immunotherapy and we discussed today possible treatment holiday as last several scans have shown essentially no change in lung nodules.  Patient will turn 89 in July and is now living in  Assisted living.  Son has noticed decreased mentation with forgetfulness.  Denies any altered mental status, headaches, blurred vision.  Plan will be to continue an additional 3 months of immunotherapy  And repeat CT scans in July.          -Total time spent in counseling and discussion about further management options including relevant lab work, treatment,  prognosis, medications and intended side effects was more than 25 minutes. More than 50% of the time was spent on counseling and coordination of care.  This includes face to face time and non-face to face time preparing to see the patient (eg, review of tests), Obtaining and/or reviewing separately obtained history, Documenting clinical information in the electronic or other health record, Independently interpreting resultsand communicating results to the patient/family/caregiver, or Care coordination.

## 2022-05-17 ENCOUNTER — OFFICE VISIT (OUTPATIENT)
Dept: HEMATOLOGY/ONCOLOGY | Facility: CLINIC | Age: 87
End: 2022-05-17
Payer: COMMERCIAL

## 2022-05-17 ENCOUNTER — CLINICAL SUPPORT (OUTPATIENT)
Dept: HEMATOLOGY/ONCOLOGY | Facility: CLINIC | Age: 87
End: 2022-05-17
Payer: COMMERCIAL

## 2022-05-17 VITALS
DIASTOLIC BLOOD PRESSURE: 90 MMHG | WEIGHT: 157 LBS | RESPIRATION RATE: 16 BRPM | BODY MASS INDEX: 27.82 KG/M2 | SYSTOLIC BLOOD PRESSURE: 158 MMHG | HEIGHT: 63 IN | TEMPERATURE: 97 F | OXYGEN SATURATION: 95 % | HEART RATE: 89 BPM

## 2022-05-17 DIAGNOSIS — C78.01 MALIGNANT NEOPLASM METASTATIC TO BOTH LUNGS: ICD-10-CM

## 2022-05-17 DIAGNOSIS — C67.9 MALIGNANT NEOPLASM OF URINARY BLADDER, UNSPECIFIED SITE: Primary | ICD-10-CM

## 2022-05-17 DIAGNOSIS — E03.9 HYPOTHYROIDISM, UNSPECIFIED TYPE: ICD-10-CM

## 2022-05-17 DIAGNOSIS — C78.02 MALIGNANT NEOPLASM METASTATIC TO BOTH LUNGS: ICD-10-CM

## 2022-05-17 DIAGNOSIS — C67.9 MALIGNANT NEOPLASM OF URINARY BLADDER, UNSPECIFIED SITE: ICD-10-CM

## 2022-05-17 LAB
ALBUMIN SERPL BCP-MCNC: 3.7 G/DL (ref 3.4–5)
ALBUMIN/GLOBULIN RATIO: 1.12 RATIO (ref 1.1–1.8)
ALP SERPL-CCNC: 57 U/L (ref 46–116)
ALT SERPL W P-5'-P-CCNC: 22 U/L (ref 12–78)
ANION GAP SERPL CALC-SCNC: 7 MMOL/L (ref 3–11)
AST SERPL-CCNC: 17 U/L (ref 15–37)
ATYPICAL LYMPHOCYTES: 1 % (ref 0–0)
BANDS: 1 % (ref 0–5)
BILIRUB SERPL-MCNC: 0.3 MG/DL (ref 0–1)
BUN SERPL-MCNC: 18 MG/DL (ref 7–18)
BUN/CREAT SERPL: 12.94 RATIO (ref 7–18)
CALCIUM SERPL-MCNC: 9.5 MG/DL (ref 8.8–10.5)
CELLS COUNTED: 100
CHLORIDE SERPL-SCNC: 102 MMOL/L (ref 100–108)
CO2 SERPL-SCNC: 30 MMOL/L (ref 21–32)
CREAT SERPL-MCNC: 1.39 MG/DL (ref 0.55–1.02)
EOSINOPHIL NFR BLD: 5 % (ref 1–3)
ERYTHROCYTE [DISTWIDTH] IN BLOOD BY AUTOMATED COUNT: 15.1 % (ref 12.5–18)
GFR ESTIMATION: 43
GLOBULIN: 3.3 G/DL (ref 2.3–3.5)
GLUCOSE SERPL-MCNC: 252 MG/DL (ref 70–110)
HCT VFR BLD AUTO: 33.8 % (ref 37–47)
HGB BLD-MCNC: 10.8 G/DL (ref 12–16)
LYMPHOCYTES NFR BLD: 18 % (ref 25–40)
MCH RBC QN AUTO: 27.1 PG (ref 27–31.2)
MCHC RBC AUTO-ENTMCNC: 32 G/DL (ref 31.8–35.4)
MCV RBC AUTO: 84.9 FL (ref 80–97)
MONOCYTES NFR BLD: 17 % (ref 1–15)
NEUTROPHILS # BLD AUTO: 2.1 10*3/UL (ref 1.8–7.7)
NEUTROPHILS NFR BLD: 58 % (ref 37–80)
NUCLEATED RED BLOOD CELLS: 0 %
PLATELETS: 195 10*3/UL (ref 142–424)
POTASSIUM SERPL-SCNC: 4 MMOL/L (ref 3.6–5.2)
PROT SERPL-MCNC: 7 G/DL (ref 6.4–8.2)
RBC # BLD AUTO: 3.98 10*6/UL (ref 4.2–5.4)
RBC MORPH BLD: ABNORMAL
SMALL PLATELETS BLD QL SMEAR: ADEQUATE
SODIUM BLD-SCNC: 139 MMOL/L (ref 135–145)
TSH SERPL DL<=0.005 MIU/L-ACNC: 0.74 UIU/ML (ref 0.36–3.74)
WBC # BLD: 3.6 10*3/UL (ref 4.6–10.2)

## 2022-05-17 PROCEDURE — 3288F PR FALLS RISK ASSESSMENT DOCUMENTED: ICD-10-PCS | Mod: CPTII,S$GLB,, | Performed by: NURSE PRACTITIONER

## 2022-05-17 PROCEDURE — 1160F RVW MEDS BY RX/DR IN RCRD: CPT | Mod: CPTII,S$GLB,, | Performed by: NURSE PRACTITIONER

## 2022-05-17 PROCEDURE — 3288F FALL RISK ASSESSMENT DOCD: CPT | Mod: CPTII,S$GLB,, | Performed by: NURSE PRACTITIONER

## 2022-05-17 PROCEDURE — 1160F PR REVIEW ALL MEDS BY PRESCRIBER/CLIN PHARMACIST DOCUMENTED: ICD-10-PCS | Mod: CPTII,S$GLB,, | Performed by: NURSE PRACTITIONER

## 2022-05-17 PROCEDURE — 99214 OFFICE O/P EST MOD 30 MIN: CPT | Mod: S$GLB,,, | Performed by: NURSE PRACTITIONER

## 2022-05-17 PROCEDURE — 1101F PR PT FALLS ASSESS DOC 0-1 FALLS W/OUT INJ PAST YR: ICD-10-PCS | Mod: CPTII,S$GLB,, | Performed by: NURSE PRACTITIONER

## 2022-05-17 PROCEDURE — 1126F PR PAIN SEVERITY QUANTIFIED, NO PAIN PRESENT: ICD-10-PCS | Mod: CPTII,S$GLB,, | Performed by: NURSE PRACTITIONER

## 2022-05-17 PROCEDURE — 1101F PT FALLS ASSESS-DOCD LE1/YR: CPT | Mod: CPTII,S$GLB,, | Performed by: NURSE PRACTITIONER

## 2022-05-17 PROCEDURE — 1159F MED LIST DOCD IN RCRD: CPT | Mod: CPTII,S$GLB,, | Performed by: NURSE PRACTITIONER

## 2022-05-17 PROCEDURE — 99214 PR OFFICE/OUTPT VISIT, EST, LEVL IV, 30-39 MIN: ICD-10-PCS | Mod: S$GLB,,, | Performed by: NURSE PRACTITIONER

## 2022-05-17 PROCEDURE — 1159F PR MEDICATION LIST DOCUMENTED IN MEDICAL RECORD: ICD-10-PCS | Mod: CPTII,S$GLB,, | Performed by: NURSE PRACTITIONER

## 2022-05-17 PROCEDURE — 1126F AMNT PAIN NOTED NONE PRSNT: CPT | Mod: CPTII,S$GLB,, | Performed by: NURSE PRACTITIONER

## 2022-05-17 RX ORDER — SODIUM CHLORIDE 0.9 % (FLUSH) 0.9 %
10 SYRINGE (ML) INJECTION
Status: CANCELLED | OUTPATIENT
Start: 2022-05-19

## 2022-05-17 RX ORDER — HEPARIN 100 UNIT/ML
500 SYRINGE INTRAVENOUS
Status: CANCELLED | OUTPATIENT
Start: 2022-05-19

## 2022-05-17 NOTE — LETTER
May 17, 2022        MD [facility] - Hematology Oncology  [address]  [place] [address]  Phone: [phone]  Fax: [phone]   Patient: Sherice Haji   MR Number: [id number]   YOB: 1932   Date of Visit: 5/17/2022       Dear Dr. Castillo:    Thank you for referring Sherice Haji to me for evaluation. Attached you will find relevant portions of my assessment and plan of care.    If you have questions, please do not hesitate to call me. I look forward to following Sherice Haji along with you.    Sincerely,      Hoda Josue NP            CC  Adalid Valera MD    [facility]

## 2022-05-17 NOTE — PROGRESS NOTES
Hematology Oncology Progress Note    Hematology Oncology  [facility]     PATIENT: Sherice Haji  : 1932 89 y.o. female  MRN [id number]     PCP: Sejal Castillo MD  Consult Requested By:  No att. providers found    Date of Service: 2022    Subjective:   Interim History:  Malignant neoplasm of urinary bladder, unspecified site    The patient doing well      Oncology History:    Sherice Haji is here for to followup for bladder cancer.  Current therapy patient doing well without new complaints she specifically denies hematuria or weight loss she has a good appetite.  This is a 87 y.o. female patient with a history of The primary encounter diagnosis was Malignant neoplasm of anterior wall of urinary bladder. Diagnoses of Essential hypertension and Type 2 diabetes mellitus without complication, without long-term current use of insulin were also pertinent to this visit., who is referred here for evaluation treatment of bladder cancer.  The patient has been feeling weak and for T for about 2 months.  Urinalysis shows hematuria or and she was referred to Urology.  CT scan with IV contrast demonstrates a bladder mass. Biopsy shows muscle invasive bladder cancer.     The patient lost about 10 lb over the past 2 months.  She denies any abdominal pain chest pain short of breath.     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 Constipation. Prior surgeries. Densely calcified coronary arteries.     ==11/2020 Keytruda restarted  ==5/2021 C scope  A scar as well as necrotic tissue identified in the left superior wall of the bladder consistent with the area of previous resection.  No evidence of recurrent disease.  Ureteral orifices of normal shape,  location and sizes effluxing clear urine.       Oncology History   Malignant neoplasm of urinary bladder   9/25/2019 Initial Diagnosis    Malignant neoplasm of anterior wall of urinary bladder     10/21/2019 Cancer Staged    Staging form: Urinary Bladder, AJCC 8th Edition  - Clinical: Stage II (cT2, cN0, cM0)     10/31/2019 - 12/11/2019 Chemotherapy    Treatment Summary   Plan Name: cisplatin taxol weekly  Treatment Goal: Curative  Status: Inactive  Start Date: 10/31/2019  End Date: 12/5/2019  Provider: Hoda Josue NP  Chemotherapy: CISplatin (PLATINOL) 20 mg/m2 = 34 mg in sodium chloride 0.9% 500 mL chemo infusion, 20 mg/m2 = 34 mg (100 % of original dose 20 mg/m2), Intravenous, Clinic/HOD 1 time, 6 of 6 cycles  Dose modification: 20 mg/m2 (original dose 20 mg/m2, Cycle 1)  PACLitaxel (TAXOL) 50 mg/m2 = 84 mg in sodium chloride 0.9% 500 mL chemo infusion, 50 mg/m2 = 84 mg (100 % of original dose 50 mg/m2), Intravenous, Clinic/HOD 1 time, 6 of 6 cycles  Dose modification: 50 mg/m2 (original dose 50 mg/m2, Cycle 1)     6/24/2020 - 6/24/2020 Chemotherapy    Treatment Summary   Plan Name: OP BLADDER GEMCITABINE CISPLATIN (SPLIT DOSE CISPLATIN) Q3W  Treatment Goal: Curative  Status: Inactive  Start Date:   End Date:   Provider: Misael Gordon MD  Chemotherapy: CISplatin (PLATINOL) 35 mg/m2 = 62 mg in sodium chloride 0.9% 500 mL chemo infusion, 35 mg/m2, Intravenous, Clinic/HOD 1 time, 0 of 4 cycles  gemcitabine (GEMZAR) 1,780 mg in sodium chloride 0.9% 250 mL chemo infusion, 1,000 mg/m2, Intravenous, Clinic/HOD 1 time, 0 of 4 cycles     11/11/2020 -   "Chemotherapy    Treatment Summary   Plan Name: OP PEMBROLIZUMAB   Treatment Goal: Control  Status: Active  Start Date: 11/11/2020  End Date: 11/24/2022 (Planned)  Provider: Misael Gordon MD  Chemotherapy: pembrolizumab (KEYTRUDA) 200 mg in sodium chloride 0.9% 100 mL chemo infusion, 200 mg, Intravenous, Clinic/HOD 1 time, 24 of 34 cycles  Dose modification: 400 mg (original dose 200 mg, Cycle 15), 200 mg (original dose 200 mg, Cycle 15), 200 mg (original dose 200 mg, Cycle 16), 200 mg (original dose 200 mg, Cycle 17)         Oncologic History:      Oncologic History     Oncologic Treatment     Pathology      Past Medical History:   Past Medical History:   Diagnosis Date    Absolute anemia 12/5/2019    Allergy     Anxiety     Arthritis     Bladder cancer     Cancer     Cataract     Removed in May 2019    Depression     Diabetes mellitus     Hypertension     Lung cancer     Malignant neoplasm of anterior wall of urinary bladder 9/25/2019    Type 2 diabetes mellitus without complication, without long-term current use of insulin 9/25/2019       Past Surgical HIstory:   Past Surgical History:   Procedure Laterality Date    BLADDER SURGERY      surgery on July 22 2020 to see if patients cancer was still present    BLADDER TUMOR EXCISION  09/2019    CATARACT EXTRACTION, BILATERAL  05/2019       Allergies:  Review of patient's allergies indicates:   Allergen Reactions    Aspirin        Medications:  Current Outpatient Medications   Medication Sig Dispense Refill    BD ALCOHOL SWABS PadM       clopidogreL (PLAVIX) 75 mg tablet       DROPLET PEN NEEDLE 32 gauge x 5/32" Ndle       furosemide (LASIX) 20 MG tablet Take 20 mg by mouth daily as needed.      glimepiride (AMARYL) 2 MG tablet       hydrOXYzine HCL (ATARAX) 25 MG tablet       indapamide (LOZOL) 2.5 MG Tab       LANTUS SOLOSTAR U-100 INSULIN glargine 100 units/mL (3mL) SubQ pen       levothyroxine (SYNTHROID) 75 MCG tablet       " loratadine (CLARITIN) 10 mg tablet       meloxicam (MOBIC) 7.5 MG tablet       metFORMIN (GLUCOPHAGE-XR) 500 MG 24 hr tablet       metoprolol tartrate (LOPRESSOR) 50 MG tablet       simvastatin (ZOCOR) 20 MG tablet       traZODone (DESYREL) 50 MG tablet       valsartan (DIOVAN) 160 MG tablet        No current facility-administered medications for this visit.       Family History:   Family History   Problem Relation Age of Onset    No Known Problems Mother     No Known Problems Father     Breast cancer Sister     Prostate cancer Brother     No Known Problems Maternal Aunt     No Known Problems Maternal Uncle     No Known Problems Paternal Aunt     No Known Problems Paternal Uncle     No Known Problems Maternal Grandmother     No Known Problems Maternal Grandfather     No Known Problems Paternal Grandmother     No Known Problems Paternal Grandfather     Breast cancer Sister     Prostate cancer Brother        Social History:  reports that she has never smoked. She has never used smokeless tobacco. She reports previous alcohol use. She reports that she does not use drugs.    Review of Systemas  Constitutional: No change in weight, appetie, fatigue, fever, or night sweats  Eyes: No changes in vision  Ears, Nose, Mouth, Throat, and Face: No hearing problems, ear pain, rummy nose, or sore throat  Respiratory: No shortness of breath or cough  Cardiovascular: No chest pain, palpitations or dizziness  Gastrointestinal: No abdominal pain, hematochezia, melena  Genitourinary: No dysuria, hematuria, nocturia, menstrual problems, post menopausal  Integumentary/Breast: No rashes or itching  Hematologic/Lymphatic: No anemia or bleeding abnormalities  Musculoskeletal: No joint or muscle abnormalities  Neurological: No sensory or motor problems, no headaches  Behavioral/Psych: No depression, anxiety, cognitive problems, or stress  Endocrine: No diabetes or thyroid problems  Allergic/Immunologic: See allergy  "above    Physical Exam      Vitals:   Vitals:    05/17/22 0958   BP: (!) 158/90   BP Location: Right arm   Patient Position: Sitting   BP Method: Medium (Automatic)   Pulse: 89   Resp: 16   Temp: 97.1 °F (36.2 °C)   TempSrc: Temporal   SpO2: 95%   Weight: 71.2 kg (157 lb)   Height: 5' 3" (1.6 m)     BMI: Body mass index is 27.81 kg/m².  BSA Body surface area is 1.78 meters squared.  ECOG Performance Status:   ECOG SCORE        ECOG 1    GENERAL APPEARANCE: Well developed, well nourished, in no acute distress.  SKIN: Inspection of the skin reveals no rashes, ulcerations or petechiae.  HEENT: The sclerae were anicteric and conjunctivae were pink and moist. Extraocular movements were intact and pupils were equal, round, and reactive to light with normal accommodation. External inspection of the ears and nose showed no scars, lesions, or masses. Lips, teeth, and gums showed normal mucosa. The oral mucosa, hard and soft palate, tongue and posterior pharynx were normal.  NECK: Supple and symmetric. There was no thyroid enlargement, and no tenderness, or masses were felt.  CRESPIRATORY: Normal AP diameter and normal contour without any kyphoscoliosis. LUNGS: Auscultation of the lungs revealed normal breath sounds without any other adventitious sounds or rubs.  CARDIOVASCULAR: There was a regular rate and rhythm without any murmurs, gallops, rubs. The carotid pulses were normal and 2+ bilaterally without bruits. Peripheral pulses were 2+ and symmetric.  GASTROINTESTINAL: Soft and nontender with normal bowel sounds. The liver span was approximately 5-6 cm in the right midclavicular line by percussion. The liver edge was nontender. The spleen was not palpable. There were no inguinal or umbilical hernias noted. No ascites was noted.  LYMPH NODES: No lymphadenopathy was appreciated in the neck, axillae or groin.  MUSCULOSKELETAL: Gait was normal. There was no tenderness or effusions noted. Muscle strength and tone were normal. " EXTREMITIES: No cyanosis, clubbing or edema.  NEUROLOGIC: Alert and oriented x 3. Normal affect. Gait was normal. Normal deep tendon reflexes with no pathological reflexes. Sensation to touch was normal.  PSYCHIATRIC: good mood, orientated to place, time and person     Labs and Imagings     Lab Results   Component Value Date    WBC 3.6 (L) 05/17/2022    HGB 10.8 (L) 05/17/2022    HCT 33.8 (L) 05/17/2022    MCV 84.9 05/17/2022    LABPLAT 195 05/17/2022       CMP  Sodium   Date Value Ref Range Status   04/26/2022 136 135 - 145 mmol/L Final   10/14/2019 133 (L) 136 - 145 mmol/L Final     Potassium   Date Value Ref Range Status   04/26/2022 4.5 3.6 - 5.2 mmol/L Final   10/14/2019 4.4 3.5 - 5.1 mmol/L Final     Chloride   Date Value Ref Range Status   04/26/2022 101 100 - 108 mmol/L Final   10/14/2019 97 (L) 98 - 107 mmol/L Final     CO2   Date Value Ref Range Status   04/26/2022 29 21 - 32 mmol/L Final   10/14/2019 28 22 - 29 mmol/L Final     Glucose   Date Value Ref Range Status   04/26/2022 181 (H) 70 - 110 mg/dL Final     BUN   Date Value Ref Range Status   04/26/2022 15 7 - 18 mg/dL Final   10/14/2019 15.8 8 - 23 mg/dL Final     Creatinine   Date Value Ref Range Status   04/26/2022 1.09 (H) 0.55 - 1.02 mg/dL Final   10/14/2019 0.74 0.50 - 0.90 mg/dL Final     Calcium   Date Value Ref Range Status   04/26/2022 9.4 8.8 - 10.5 mg/dL Final   10/14/2019 9.9 8.6 - 10.2 mg/dL Final     Total Protein   Date Value Ref Range Status   04/26/2022 6.5 6.4 - 8.2 g/dL Final     Albumin   Date Value Ref Range Status   04/26/2022 3.4 3.4 - 5.0 g/dL Final   10/14/2019 4.3 3.5 - 5.2 g/dL Final     Total Bilirubin   Date Value Ref Range Status   04/26/2022 0.2 0.0 - 1.0 mg/dL Final     Alkaline Phosphatase   Date Value Ref Range Status   04/26/2022 56 46 - 116 U/L Final     AST   Date Value Ref Range Status   04/26/2022 13 (L) 15 - 37 U/L Final   10/14/2019 11 0 - 32 U/L Final     ALT   Date Value Ref Range Status   04/26/2022 16 12 -  78 U/L Final     Anion Gap   Date Value Ref Range Status   2022 6.0 3.0 - 11.0 mmol/L Final   10/14/2019 8.0 8.0 - 17.0 mmol/L Final     Comment:     NOTE  Testing performed at:  [facility], [address] [place]  [address] CLIA #:[id number]         All cell laboratory reviewed which shows white cell 1.7 and sodium 126  Imaging: CT C/A/P 21:  [facility] UNKNOWN RAD EAP                                RADIOLOGY REPORT        PT NAME: SANDOVAL GUZMÁN      [facility]     : 1932 F 89             [address]    ACCT: [id number]                                              [facility] Rec #: [id number]                                        [id number]    [facility]             Procedure: CHEST THORAX  WO/W CONT    REQUISITION #: [id number]      REPORT #: [id number]           DATE OF EXAM: 22    TIME OF EXAM: 0800       CMS MANDATED QUALITY DATA - CT RADIATION - 436        All CT scans at this facility use dose modulation, iterative-reconstruction,   and/or weight-based dosing when appropriate to reduce radiation dose to as   low as reasonably achievable.        CT CHEST/ABDOMEN/PELVIS WITHOUT AND WITH CONTRAST        HISTORY:  Bladder cancer.        COMPARISON:  2021.        TECHNIQUE: Contiguous axial images are acquired through the chest, abdomen,    and pelvis from the thoracic inlet through the pelvic inlet. 100 cc Isovue   300 intravenous contrast was administered prior to image acquisition. The   data set was reconstructed at 5 mm axial, coronal, and sagittal intervals.   The estimated patient radiation dose for this examination is 30.15 mSv.        FINDINGS:        CHEST:        Shotty mediastinal lymph nodes. However, none are enlarged by cross-  sectional imaging criteria.  The heart is enlarged. Atherosclerotic   calcifications of the aorta and its branch vessels including the coronary   arteries.     Multiple pulmonary nodules within both lungs measuring up to 6 mm in the   right middle lobe and 5 mm in the left lower lobe.  Mild bronchiectasis   within the lower lungs. Nonspecific groundglass opacities within the lower   lungs. No pleural fluid or pneumothorax.        Small hiatal hernia.        ABDOMEN AND PELVIS:        Scattered calcifications within the right and left hepatic lobes.    The spleen is unremarkable    The bilateral adrenal glands and pancreas are unremarkable.        The gallbladder is unremarkable.        The kidneys are symmetric in size. Low-density lesions within the kidneys   are too small to fully characterize the likely simple cysts. No   hydronephrosis or hydroureter.  No perinephric fat stranding.        No dilatation of the large or small bowel.    No free air or free fluid.  No lymphadenopathy within the abdomen or pelvis.        Atherosclerotic calcifications of the aorta and its branch vessels.        The uterus has been removed. Pelvic floor prolapse.        The urinary bladder is unremarkable.        No aggressive sclerotic or lytic osseous lesion.            IMPRESSION:        Urinary bladder is unremarkable.        Multiple pulmonary nodules within both lungs. These are not significantly   changed when compared to the prior examination.            DICTATING PHYSICIAN:  CHA PAGE MD                   Date Dictated: 04/14/22 0848        Signed By:  CHA PAGE MD <Electronically signed by CHA PAGE MD in    OV>    Date Signed:  04/14/22 0915     CC: IVON SIERRA NP ; IVON SIERRA NP      ADMITTING PHYSICIAN:                                                                                                    ORDERING PHY: IVON SIERRA NP                                                                                                                                                      ATTENDING PHY: IVON SIERRA NP    Patient Status:  REG CLI    Admit Service Date:  22       CT Abdomen Pelvis  Without Contrast                                RADIOLOGY REPORT        PT NAME: SANDOVAL GUZMÁN      [facility]     : 1932 F 89             [address]    ACCT: [id number]                                              [place]    [facility] Rec #: [id number]                                        [id number]    [facility]             Procedure: CT ABD   PELVIS WO/W CONTRAST    REQUISITION #: [id number]      REPORT #: [id number]           DATE OF EXAM: 22    TIME OF EXAM: 0745       CMS MANDATED QUALITY DATA - CT RADIATION - 436        All CT scans at this facility use dose modulation, iterative-reconstruction,   and/or weight-based dosing when appropriate to reduce radiation dose to as   low as reasonably achievable.        CT CHEST/ABDOMEN/PELVIS WITHOUT AND WITH CONTRAST        HISTORY:  Bladder cancer.        COMPARISON:  2021.        TECHNIQUE: Contiguous axial images are acquired through the chest, abdomen,    and pelvis from the thoracic inlet through the pelvic inlet. 100 cc Isovue   300 intravenous contrast was administered prior to image acquisition. The   data set was reconstructed at 5 mm axial, coronal, and sagittal intervals.   The estimated patient radiation dose for this examination is 30.15 mSv.        FINDINGS:        CHEST:        Shotty mediastinal lymph nodes. However, none are enlarged by cross-  sectional imaging criteria.  The heart is enlarged. Atherosclerotic   calcifications of the aorta and its branch vessels including the coronary   arteries.    Multiple pulmonary nodules within both lungs measuring up to 6 mm in the   right middle lobe and 5 mm in the left lower lobe.  Mild bronchiectasis   within the lower lungs. Nonspecific groundglass opacities within the lower   lungs. No pleural fluid or pneumothorax.        Small hiatal hernia.        ABDOMEN AND PELVIS:        Scattered  calcifications within the right and left hepatic lobes.    The spleen is unremarkable    The bilateral adrenal glands and pancreas are unremarkable.        The gallbladder is unremarkable.        The kidneys are symmetric in size. Low-density lesions within the kidneys   are too small to fully characterize the likely simple cysts. No   hydronephrosis or hydroureter.  No perinephric fat stranding.        No dilatation of the large or small bowel.    No free air or free fluid.  No lymphadenopathy within the abdomen or pelvis.        Atherosclerotic calcifications of the aorta and its branch vessels.        The uterus has been removed. Pelvic floor prolapse.        The urinary bladder is unremarkable.        No aggressive sclerotic or lytic osseous lesion.            IMPRESSION:        Urinary bladder is unremarkable.        Multiple pulmonary nodules within both lungs. These are not significantly   changed when compared to the prior examination.            DICTATING PHYSICIAN:  CHA PAGE MD                   Date Dictated: 04/14/22 0848        Signed By:  CHA PAGE MD <Electronically signed by CHA PAGE MD in    OV>    Date Signed:  04/14/22 0915     CC: IVON SIERRA NP ; IVON SIERRA NP      ADMITTING PHYSICIAN:                                                                                                    ORDERING PHY: IVON SIERRA NP                                                                                                                                                      ATTENDING PHY: IVON SIERRA NP    Patient Status:  REG CLI    Admit Service Date: 04/14/22                   Assessment:     Principle Problem: No primary diagnosis found.   Co-morbidity/Active Problems:   Patient Active Problem List   Diagnosis    Malignant neoplasm of urinary bladder    Essential hypertension    Type 2 diabetes mellitus without complication, without long-term current use of insulin    Encounter  for antineoplastic chemotherapy    Chemotherapy-induced nausea    Chemotherapy-induced neutropenia    Absolute anemia    Encounter for immunotherapy    Malignant neoplasm metastatic to both lungs    Elevated blood sugar        Sherice Haji is a 89 y.o. female with PMH of The primary encounter diagnosis was Malignant neoplasm of urinary bladder, unspecified site. A diagnosis of Malignant neoplasm metastatic to both lungs was also pertinent to this visit., with Malignant neoplasm of urinary bladder, unspecified site [C67.9]   Sherice Haji is a 87 y.o. 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 [C67.3]   87-year-old female otherwise healthy except hypertension diabetes present with bladder mass biopsy-proven muscle invasive bladder cancer( stage II) and     biopsy-proven muscle invasive bladder cancer( stage II) recurrence  Normal renal function  Neutropenia  Status post chemo radiation therapy December 2019  recurrent muscle invasive bladder cance  3/17/21Pulm nodules noted on CT Chest     Plan:  Status post   Overall Plan:  Concurrent chemoradiation therapy, followed by optional cystectomy and full dose chemo C/G; patient does not want chemotherapy so the other option is immunotherapy  12/2/21:  Patient continues to tolerate Keytruda without any complaint.  her elevated blood sugars are being followed by her PCP   ==CT chest  scan reviewed will request comparison to CT Chest done 3/21 at [facility]  ==RTC in 3 weeks with labs   12/22/21: patient continues to tolerate tx well but some fatigue noted, did not get ct scans done, will help schedule scans prior to next appt. Labs reviewed and pt cleared for tx.  Pt recently  moved into [facility] last month.   1/11/22:  Recent CT scans show stable pulmonary nodules, currently no evidence of disease in abdomen and pelvis.  Patient continues to tolerate immunotherapy very well, labs reviewed and patient will continue with current therapy as planned.  She denies any diarrhea, cough, rash or itching  4/5/22:  She continues to tolerate treatment well, does describes some occasional urinary incontinence over the last several months.  Patient complains of bilateral feet pain states she has been diagnosed with bone spurs in the past.  Advised patient to reach out to her primary care for referral to podiatry for evaluation.  Labs are reviewed and patient is cleared for treatment as planned on Thursday.  Plan is to repeat CT scans prior to next treatment  4/26/22:  Patient and son here in clinic today to review recent CT scans showing multiple stable pulmonary nodules measuring 5-6 mm in size.  Patient has completed approximately 18 months of immunotherapy and we discussed today possible treatment holiday as last several scans have shown essentially no change in lung nodules.  Patient will turn 89 in July and is now living in  Assisted living.  Son has noticed decreased mentation with forgetfulness.  Denies any altered mental status, headaches, blurred vision.  Plan will be to continue an additional 3 months of immunotherapy  And repeat CT scans in July.  5/17/22:  No new complaints, continues to tolerate immunotherapy well.         -Total time spent in counseling and discussion about further management options including relevant lab work, treatment,  prognosis, medications and intended side effects was more than 25 minutes. More than 50% of the time was spent on counseling and coordination of care.  This includes face to face time and non-face to face time preparing to see the patient (eg, review of tests), Obtaining and/or reviewing separately obtained history, Documenting  clinical information in the electronic or other health record, Independently interpreting resultsand communicating results to the patient/family/caregiver, or Care coordination.

## 2022-06-02 ENCOUNTER — HOSPITAL ENCOUNTER (OUTPATIENT)
Dept: TELEMEDICINE | Facility: HOSPITAL | Age: 87
Discharge: HOME | End: 2022-06-02
Payer: COMMERCIAL

## 2022-06-09 ENCOUNTER — TELEPHONE (OUTPATIENT)
Dept: HEMATOLOGY/ONCOLOGY | Facility: CLINIC | Age: 87
End: 2022-06-09
Payer: COMMERCIAL

## 2022-06-09 NOTE — TELEPHONE ENCOUNTER
----- Message from Isis Phelps sent at 6/9/2022  9:42 AM CDT -----  Contact: Ana palencia/ [facility]  Rena from [facility] called and stated patient will be getting discharged from the hospital on Tuesday and need a follow up visit. Please call Rena with appt date at [phone]

## 2022-06-14 ENCOUNTER — TELEPHONE (OUTPATIENT)
Dept: FAMILY MEDICINE | Facility: CLINIC | Age: 87
End: 2022-06-14
Payer: COMMERCIAL

## 2022-06-14 NOTE — TELEPHONE ENCOUNTER
Spoke with daughter today Lindasandy Phillip, regarding patient's recent stroke.  Daughter states Mr. Damon will be transferred to [facility] for recovery.  She reports Ms. Smiley is on a pureed diet, some slurred speech, and some difficulty with ambulation although she is able to walk with her cane.  At this time her treatment will be placed on hold as she recovers from stroke and we will see patient in clinic in 2 months.  Daughter states that Mr. Smiley has decided she no longer wants to treat her cancer, but we will revisit this at her next appointment.